# Patient Record
Sex: MALE | Race: WHITE | Employment: OTHER | URBAN - METROPOLITAN AREA
[De-identification: names, ages, dates, MRNs, and addresses within clinical notes are randomized per-mention and may not be internally consistent; named-entity substitution may affect disease eponyms.]

---

## 2019-01-01 ENCOUNTER — MEDICAL CORRESPONDENCE (OUTPATIENT)
Dept: HEALTH INFORMATION MANAGEMENT | Facility: CLINIC | Age: 74
End: 2019-01-01

## 2020-01-01 ENCOUNTER — ANESTHESIA EVENT (OUTPATIENT)
Dept: SURGERY | Facility: CLINIC | Age: 75
DRG: 003 | End: 2020-01-01
Payer: COMMERCIAL

## 2020-01-01 ENCOUNTER — APPOINTMENT (OUTPATIENT)
Dept: GENERAL RADIOLOGY | Facility: CLINIC | Age: 75
DRG: 003 | End: 2020-01-01
Attending: ANESTHESIOLOGY
Payer: COMMERCIAL

## 2020-01-01 ENCOUNTER — APPOINTMENT (OUTPATIENT)
Dept: GENERAL RADIOLOGY | Facility: CLINIC | Age: 75
DRG: 003 | End: 2020-01-01
Attending: NEUROLOGICAL SURGERY
Payer: COMMERCIAL

## 2020-01-01 ENCOUNTER — PREP FOR PROCEDURE (OUTPATIENT)
Dept: CARDIOLOGY | Facility: CLINIC | Age: 75
End: 2020-01-01

## 2020-01-01 ENCOUNTER — TRANSFERRED RECORDS (OUTPATIENT)
Dept: HEALTH INFORMATION MANAGEMENT | Facility: CLINIC | Age: 75
End: 2020-01-01

## 2020-01-01 ENCOUNTER — ANESTHESIA (OUTPATIENT)
Dept: SURGERY | Facility: CLINIC | Age: 75
DRG: 003 | End: 2020-01-01
Payer: COMMERCIAL

## 2020-01-01 ENCOUNTER — MEDICAL CORRESPONDENCE (OUTPATIENT)
Dept: SURGERY | Facility: CLINIC | Age: 75
End: 2020-01-01

## 2020-01-01 ENCOUNTER — APPOINTMENT (OUTPATIENT)
Dept: CARDIOLOGY | Facility: CLINIC | Age: 75
DRG: 003 | End: 2020-01-01
Attending: ANESTHESIOLOGY
Payer: COMMERCIAL

## 2020-01-01 ENCOUNTER — ANESTHESIA (OUTPATIENT)
Dept: INTENSIVE CARE | Facility: CLINIC | Age: 75
End: 2020-01-01

## 2020-01-01 ENCOUNTER — ANESTHESIA (OUTPATIENT)
Dept: INTENSIVE CARE | Facility: CLINIC | Age: 75
DRG: 003 | End: 2020-01-01
Payer: COMMERCIAL

## 2020-01-01 ENCOUNTER — ANESTHESIA EVENT (OUTPATIENT)
Dept: INTENSIVE CARE | Facility: CLINIC | Age: 75
End: 2020-01-01

## 2020-01-01 ENCOUNTER — APPOINTMENT (OUTPATIENT)
Dept: GENERAL RADIOLOGY | Facility: CLINIC | Age: 75
DRG: 003 | End: 2020-01-01
Payer: COMMERCIAL

## 2020-01-01 ENCOUNTER — APPOINTMENT (OUTPATIENT)
Dept: GENERAL RADIOLOGY | Facility: CLINIC | Age: 75
DRG: 003 | End: 2020-01-01
Attending: SURGERY
Payer: COMMERCIAL

## 2020-01-01 ENCOUNTER — APPOINTMENT (OUTPATIENT)
Dept: GENERAL RADIOLOGY | Facility: CLINIC | Age: 75
DRG: 003 | End: 2020-01-01
Attending: PHYSICIAN ASSISTANT
Payer: COMMERCIAL

## 2020-01-01 ENCOUNTER — HOSPITAL ENCOUNTER (INPATIENT)
Facility: CLINIC | Age: 75
LOS: 9 days | DRG: 003 | End: 2020-01-26
Attending: FAMILY MEDICINE | Admitting: ANESTHESIOLOGY
Payer: COMMERCIAL

## 2020-01-01 ENCOUNTER — APPOINTMENT (OUTPATIENT)
Dept: GENERAL RADIOLOGY | Facility: CLINIC | Age: 75
DRG: 003 | End: 2020-01-01
Attending: STUDENT IN AN ORGANIZED HEALTH CARE EDUCATION/TRAINING PROGRAM
Payer: COMMERCIAL

## 2020-01-01 ENCOUNTER — APPOINTMENT (OUTPATIENT)
Dept: GENERAL RADIOLOGY | Facility: CLINIC | Age: 75
DRG: 003 | End: 2020-01-01
Attending: INTERNAL MEDICINE
Payer: COMMERCIAL

## 2020-01-01 ENCOUNTER — APPOINTMENT (OUTPATIENT)
Dept: CARDIOLOGY | Facility: CLINIC | Age: 75
DRG: 003 | End: 2020-01-01
Attending: STUDENT IN AN ORGANIZED HEALTH CARE EDUCATION/TRAINING PROGRAM
Payer: COMMERCIAL

## 2020-01-01 ENCOUNTER — APPOINTMENT (OUTPATIENT)
Dept: CT IMAGING | Facility: CLINIC | Age: 75
DRG: 003 | End: 2020-01-01
Payer: COMMERCIAL

## 2020-01-01 ENCOUNTER — ANESTHESIA EVENT (OUTPATIENT)
Dept: INTENSIVE CARE | Facility: CLINIC | Age: 75
DRG: 003 | End: 2020-01-01
Payer: COMMERCIAL

## 2020-01-01 VITALS
DIASTOLIC BLOOD PRESSURE: 67 MMHG | WEIGHT: 223.33 LBS | SYSTOLIC BLOOD PRESSURE: 86 MMHG | OXYGEN SATURATION: 33 % | HEART RATE: 86 BPM | TEMPERATURE: 98.4 F | BODY MASS INDEX: 31.97 KG/M2 | HEIGHT: 70 IN

## 2020-01-01 DIAGNOSIS — I71.21 ASCENDING AORTIC ANEURYSM (H): ICD-10-CM

## 2020-01-01 DIAGNOSIS — I71.21 ASCENDING AORTIC ANEURYSM (H): Primary | ICD-10-CM

## 2020-01-01 DIAGNOSIS — I71.00 DISSECTION OF AORTA, UNSPECIFIED PORTION OF AORTA (H): ICD-10-CM

## 2020-01-01 DIAGNOSIS — I50.9 HEART FAILURE (H): Primary | ICD-10-CM

## 2020-01-01 DIAGNOSIS — I71.019 DISSECTION OF THORACIC AORTA (H): Primary | ICD-10-CM

## 2020-01-01 DIAGNOSIS — I50.9 HEART FAILURE (H): ICD-10-CM

## 2020-01-01 LAB
ABO + RH BLD: NORMAL
ACID FAST STN SPEC QL: NORMAL
ALBUMIN SERPL-MCNC: 1.6 G/DL (ref 3.4–5)
ALBUMIN SERPL-MCNC: 1.8 G/DL (ref 3.4–5)
ALBUMIN SERPL-MCNC: 2.2 G/DL (ref 3.4–5)
ALBUMIN SERPL-MCNC: 2.3 G/DL (ref 3.4–5)
ALBUMIN SERPL-MCNC: 2.5 G/DL (ref 3.4–5)
ALBUMIN SERPL-MCNC: 2.8 G/DL (ref 3.4–5)
ALBUMIN SERPL-MCNC: 2.8 G/DL (ref 3.4–5)
ALBUMIN SERPL-MCNC: 3.1 G/DL (ref 3.4–5)
ALBUMIN SERPL-MCNC: 3.2 G/DL (ref 3.4–5)
ALBUMIN SERPL-MCNC: 3.4 G/DL (ref 3.4–5)
ALBUMIN SERPL-MCNC: 3.7 G/DL (ref 3.4–5)
ALBUMIN UR-MCNC: 30 MG/DL
ALBUMIN UR-MCNC: NEGATIVE MG/DL
ALP SERPL-CCNC: 35 U/L (ref 40–150)
ALP SERPL-CCNC: 39 U/L (ref 40–150)
ALP SERPL-CCNC: 40 U/L (ref 40–150)
ALP SERPL-CCNC: 44 U/L (ref 40–150)
ALP SERPL-CCNC: 47 U/L (ref 40–150)
ALP SERPL-CCNC: 48 U/L (ref 40–150)
ALP SERPL-CCNC: 49 U/L (ref 40–150)
ALP SERPL-CCNC: 49 U/L (ref 40–150)
ALP SERPL-CCNC: 78 U/L (ref 40–150)
ALT SERPL W P-5'-P-CCNC: 138 U/L (ref 0–70)
ALT SERPL W P-5'-P-CCNC: 144 U/L (ref 0–70)
ALT SERPL W P-5'-P-CCNC: 158 U/L (ref 0–70)
ALT SERPL W P-5'-P-CCNC: 164 U/L (ref 0–70)
ALT SERPL W P-5'-P-CCNC: 169 U/L (ref 0–70)
ALT SERPL W P-5'-P-CCNC: 174 U/L (ref 0–70)
ALT SERPL W P-5'-P-CCNC: 177 U/L (ref 0–70)
ALT SERPL W P-5'-P-CCNC: 185 U/L (ref 0–70)
ALT SERPL W P-5'-P-CCNC: 193 U/L (ref 0–70)
ALT SERPL W P-5'-P-CCNC: 202 U/L (ref 0–70)
ALT SERPL W P-5'-P-CCNC: 211 U/L (ref 0–70)
ANGLE RATE OF CLOT GROWTH: 32.1 DEG (ref 59–74)
ANGLE RATE OF CLOT GROWTH: 50.1 DEG (ref 59–74)
ANGLE RATE OF CLOT GROWTH: 62.5 DEG (ref 59–74)
ANGLE RATE OF CLOT GROWTH: 74.2 DEG (ref 59–74)
ANGLE RATE OF CLOT GROWTH: ABNORMAL DEG (ref 59–74)
ANGLE RATE OF CLOT STRENGTH: 63.4 DEGREES (ref 53–72)
ANION GAP SERPL CALCULATED.3IONS-SCNC: 10 MMOL/L (ref 3–14)
ANION GAP SERPL CALCULATED.3IONS-SCNC: 10 MMOL/L (ref 3–14)
ANION GAP SERPL CALCULATED.3IONS-SCNC: 12 MMOL/L (ref 3–14)
ANION GAP SERPL CALCULATED.3IONS-SCNC: 15 MMOL/L (ref 3–14)
ANION GAP SERPL CALCULATED.3IONS-SCNC: 17 MMOL/L (ref 3–14)
ANION GAP SERPL CALCULATED.3IONS-SCNC: 2 MMOL/L (ref 3–14)
ANION GAP SERPL CALCULATED.3IONS-SCNC: 21 MMOL/L (ref 3–14)
ANION GAP SERPL CALCULATED.3IONS-SCNC: 3 MMOL/L (ref 3–14)
ANION GAP SERPL CALCULATED.3IONS-SCNC: 3 MMOL/L (ref 3–14)
ANION GAP SERPL CALCULATED.3IONS-SCNC: 4 MMOL/L (ref 3–14)
ANION GAP SERPL CALCULATED.3IONS-SCNC: 5 MMOL/L (ref 3–14)
ANION GAP SERPL CALCULATED.3IONS-SCNC: 6 MMOL/L (ref 3–14)
ANION GAP SERPL CALCULATED.3IONS-SCNC: 6 MMOL/L (ref 3–14)
ANION GAP SERPL CALCULATED.3IONS-SCNC: 7 MMOL/L (ref 3–14)
ANION GAP SERPL CALCULATED.3IONS-SCNC: 7 MMOL/L (ref 3–14)
ANION GAP SERPL CALCULATED.3IONS-SCNC: 9 MMOL/L (ref 3–14)
ANION GAP SERPL CALCULATED.3IONS-SCNC: <1 MMOL/L (ref 3–14)
APPEARANCE UR: ABNORMAL
APPEARANCE UR: CLEAR
APTT PPP: 101 SEC (ref 22–37)
APTT PPP: 117 SEC (ref 22–37)
APTT PPP: 33 SEC (ref 22–37)
APTT PPP: 34 SEC (ref 22–37)
APTT PPP: 35 SEC (ref 22–37)
APTT PPP: 35 SEC (ref 22–37)
APTT PPP: 36 SEC (ref 22–37)
APTT PPP: 37 SEC (ref 22–37)
APTT PPP: 39 SEC (ref 22–37)
APTT PPP: 41 SEC (ref 22–37)
APTT PPP: 45 SEC (ref 22–37)
APTT PPP: 46 SEC (ref 22–37)
APTT PPP: 48 SEC (ref 22–37)
APTT PPP: 49 SEC (ref 22–37)
APTT PPP: 52 SEC (ref 22–37)
APTT PPP: 53 SEC (ref 22–37)
APTT PPP: 54 SEC (ref 22–37)
APTT PPP: 56 SEC (ref 22–37)
APTT PPP: 64 SEC (ref 22–37)
APTT PPP: 66 SEC (ref 22–37)
APTT PPP: 66 SEC (ref 22–37)
APTT PPP: 75 SEC (ref 22–37)
APTT PPP: 77 SEC (ref 22–37)
APTT PPP: 85 SEC (ref 22–37)
APTT PPP: >240 SEC (ref 22–37)
AST SERPL W P-5'-P-CCNC: 115 U/L (ref 0–45)
AST SERPL W P-5'-P-CCNC: 154 U/L (ref 0–45)
AST SERPL W P-5'-P-CCNC: 191 U/L (ref 0–45)
AST SERPL W P-5'-P-CCNC: 209 U/L (ref 0–45)
AST SERPL W P-5'-P-CCNC: 210 U/L (ref 0–45)
AST SERPL W P-5'-P-CCNC: 325 U/L (ref 0–45)
AST SERPL W P-5'-P-CCNC: 364 U/L (ref 0–45)
AST SERPL W P-5'-P-CCNC: 395 U/L (ref 0–45)
AST SERPL W P-5'-P-CCNC: 87 U/L (ref 0–45)
AT III ACT/NOR PPP CHRO: 49 % (ref 85–135)
AT III ACT/NOR PPP CHRO: 53 % (ref 85–135)
AT III ACT/NOR PPP CHRO: 55 % (ref 85–135)
AT III ACT/NOR PPP CHRO: 64 % (ref 85–135)
BACTERIA SPEC CULT: ABNORMAL
BACTERIA SPEC CULT: NO GROWTH
BACTERIA SPEC CULT: NORMAL
BASE DEFICIT BLDA-SCNC: 0.2 MMOL/L
BASE DEFICIT BLDA-SCNC: 0.3 MMOL/L
BASE DEFICIT BLDA-SCNC: 0.3 MMOL/L
BASE DEFICIT BLDA-SCNC: 0.4 MMOL/L
BASE DEFICIT BLDA-SCNC: 0.5 MMOL/L
BASE DEFICIT BLDA-SCNC: 0.7 MMOL/L
BASE DEFICIT BLDA-SCNC: 1.2 MMOL/L
BASE DEFICIT BLDA-SCNC: 1.5 MMOL/L
BASE DEFICIT BLDA-SCNC: 10 MMOL/L
BASE DEFICIT BLDA-SCNC: 14.2 MMOL/L
BASE DEFICIT BLDA-SCNC: 16.1 MMOL/L
BASE DEFICIT BLDA-SCNC: 2.5 MMOL/L
BASE DEFICIT BLDA-SCNC: 3 MMOL/L
BASE DEFICIT BLDA-SCNC: 3.7 MMOL/L
BASE DEFICIT BLDA-SCNC: 3.9 MMOL/L
BASE DEFICIT BLDA-SCNC: 4 MMOL/L
BASE DEFICIT BLDA-SCNC: 4.2 MMOL/L
BASE DEFICIT BLDA-SCNC: 5 MMOL/L
BASE DEFICIT BLDA-SCNC: 5.1 MMOL/L
BASE DEFICIT BLDA-SCNC: 5.3 MMOL/L
BASE DEFICIT BLDA-SCNC: 5.7 MMOL/L
BASE DEFICIT BLDA-SCNC: 5.8 MMOL/L
BASE DEFICIT BLDA-SCNC: 6.1 MMOL/L
BASE DEFICIT BLDA-SCNC: 6.2 MMOL/L
BASE DEFICIT BLDA-SCNC: 6.3 MMOL/L
BASE DEFICIT BLDA-SCNC: 6.8 MMOL/L
BASE DEFICIT BLDA-SCNC: 6.8 MMOL/L
BASE DEFICIT BLDA-SCNC: 7.3 MMOL/L
BASE DEFICIT BLDA-SCNC: 7.3 MMOL/L
BASE DEFICIT BLDV-SCNC: 2.9 MMOL/L
BASE DEFICIT BLDV-SCNC: 3.8 MMOL/L
BASE DEFICIT BLDV-SCNC: 4.8 MMOL/L
BASE DEFICIT BLDV-SCNC: 5.1 MMOL/L
BASE EXCESS BLDA CALC-SCNC: 0 MMOL/L
BASE EXCESS BLDA CALC-SCNC: 0.2 MMOL/L
BASE EXCESS BLDA CALC-SCNC: 0.4 MMOL/L
BASE EXCESS BLDA CALC-SCNC: 0.4 MMOL/L
BASE EXCESS BLDA CALC-SCNC: 1.6 MMOL/L
BASE EXCESS BLDA CALC-SCNC: 1.7 MMOL/L
BASE EXCESS BLDA CALC-SCNC: 10 MMOL/L
BASE EXCESS BLDA CALC-SCNC: 10.2 MMOL/L
BASE EXCESS BLDA CALC-SCNC: 10.4 MMOL/L
BASE EXCESS BLDA CALC-SCNC: 10.9 MMOL/L
BASE EXCESS BLDA CALC-SCNC: 11 MMOL/L
BASE EXCESS BLDA CALC-SCNC: 11.1 MMOL/L
BASE EXCESS BLDA CALC-SCNC: 11.3 MMOL/L
BASE EXCESS BLDA CALC-SCNC: 11.9 MMOL/L
BASE EXCESS BLDA CALC-SCNC: 12 MMOL/L
BASE EXCESS BLDA CALC-SCNC: 12.4 MMOL/L
BASE EXCESS BLDA CALC-SCNC: 12.6 MMOL/L
BASE EXCESS BLDA CALC-SCNC: 12.9 MMOL/L
BASE EXCESS BLDA CALC-SCNC: 13 MMOL/L
BASE EXCESS BLDA CALC-SCNC: 13.2 MMOL/L
BASE EXCESS BLDA CALC-SCNC: 13.3 MMOL/L
BASE EXCESS BLDA CALC-SCNC: 13.4 MMOL/L
BASE EXCESS BLDA CALC-SCNC: 13.4 MMOL/L
BASE EXCESS BLDA CALC-SCNC: 14 MMOL/L
BASE EXCESS BLDA CALC-SCNC: 14.6 MMOL/L
BASE EXCESS BLDA CALC-SCNC: 14.6 MMOL/L
BASE EXCESS BLDA CALC-SCNC: 14.8 MMOL/L
BASE EXCESS BLDA CALC-SCNC: 2.2 MMOL/L
BASE EXCESS BLDA CALC-SCNC: 3.4 MMOL/L
BASE EXCESS BLDA CALC-SCNC: 4.7 MMOL/L
BASE EXCESS BLDA CALC-SCNC: 5.3 MMOL/L
BASE EXCESS BLDA CALC-SCNC: 5.4 MMOL/L
BASE EXCESS BLDA CALC-SCNC: 5.9 MMOL/L
BASE EXCESS BLDA CALC-SCNC: 6.1 MMOL/L
BASE EXCESS BLDA CALC-SCNC: 6.2 MMOL/L
BASE EXCESS BLDA CALC-SCNC: 6.3 MMOL/L
BASE EXCESS BLDA CALC-SCNC: 6.3 MMOL/L
BASE EXCESS BLDA CALC-SCNC: 6.7 MMOL/L
BASE EXCESS BLDA CALC-SCNC: 6.8 MMOL/L
BASE EXCESS BLDA CALC-SCNC: 6.9 MMOL/L
BASE EXCESS BLDA CALC-SCNC: 7.2 MMOL/L
BASE EXCESS BLDA CALC-SCNC: 7.2 MMOL/L
BASE EXCESS BLDA CALC-SCNC: 7.7 MMOL/L
BASE EXCESS BLDA CALC-SCNC: 7.9 MMOL/L
BASE EXCESS BLDA CALC-SCNC: 8 MMOL/L
BASE EXCESS BLDA CALC-SCNC: 8.1 MMOL/L
BASE EXCESS BLDA CALC-SCNC: 8.4 MMOL/L
BASE EXCESS BLDA CALC-SCNC: 8.5 MMOL/L
BASE EXCESS BLDA CALC-SCNC: 9 MMOL/L
BASE EXCESS BLDA CALC-SCNC: 9.1 MMOL/L
BASE EXCESS BLDA CALC-SCNC: 9.2 MMOL/L
BASE EXCESS BLDA CALC-SCNC: 9.6 MMOL/L
BASE EXCESS BLDA CALC-SCNC: 9.7 MMOL/L
BASE EXCESS BLDA CALC-SCNC: 9.8 MMOL/L
BASE EXCESS BLDA CALC-SCNC: 9.9 MMOL/L
BASE EXCESS BLDV CALC-SCNC: 0.4 MMOL/L
BASE EXCESS BLDV CALC-SCNC: 0.6 MMOL/L
BASE EXCESS BLDV CALC-SCNC: 10.3 MMOL/L
BASE EXCESS BLDV CALC-SCNC: 10.5 MMOL/L
BASE EXCESS BLDV CALC-SCNC: 13.3 MMOL/L
BASE EXCESS BLDV CALC-SCNC: 16.2 MMOL/L
BASE EXCESS BLDV CALC-SCNC: 2.2 MMOL/L
BASE EXCESS BLDV CALC-SCNC: 3.3 MMOL/L
BASE EXCESS BLDV CALC-SCNC: 3.6 MMOL/L
BASE EXCESS BLDV CALC-SCNC: 6.5 MMOL/L
BASE EXCESS BLDV CALC-SCNC: 9.3 MMOL/L
BASOPHILS # BLD AUTO: 0 10E9/L (ref 0–0.2)
BASOPHILS # BLD AUTO: 0.1 10E9/L (ref 0–0.2)
BASOPHILS NFR BLD AUTO: 0.4 %
BASOPHILS NFR BLD AUTO: 0.6 %
BILIRUB DIRECT SERPL-MCNC: 3.9 MG/DL (ref 0–0.2)
BILIRUB SERPL-MCNC: 1.6 MG/DL (ref 0.2–1.3)
BILIRUB SERPL-MCNC: 1.8 MG/DL (ref 0.2–1.3)
BILIRUB SERPL-MCNC: 1.8 MG/DL (ref 0.2–1.3)
BILIRUB SERPL-MCNC: 1.9 MG/DL (ref 0.2–1.3)
BILIRUB SERPL-MCNC: 2.2 MG/DL (ref 0.2–1.3)
BILIRUB SERPL-MCNC: 2.3 MG/DL (ref 0.2–1.3)
BILIRUB SERPL-MCNC: 2.7 MG/DL (ref 0.2–1.3)
BILIRUB SERPL-MCNC: 2.8 MG/DL (ref 0.2–1.3)
BILIRUB SERPL-MCNC: 5.3 MG/DL (ref 0.2–1.3)
BILIRUB UR QL STRIP: NEGATIVE
BILIRUB UR QL STRIP: NEGATIVE
BLD GP AB SCN SERPL QL: NORMAL
BLD PROD TYP BPU: NORMAL
BLD UNIT ID BPU: 0
BLOOD BANK CMNT PATIENT-IMP: NORMAL
BLOOD PRODUCT CODE: NORMAL
BPU ID: NORMAL
BUN SERPL-MCNC: 21 MG/DL (ref 7–30)
BUN SERPL-MCNC: 22 MG/DL (ref 7–30)
BUN SERPL-MCNC: 23 MG/DL (ref 7–30)
BUN SERPL-MCNC: 23 MG/DL (ref 7–30)
BUN SERPL-MCNC: 24 MG/DL (ref 7–30)
BUN SERPL-MCNC: 25 MG/DL (ref 7–30)
BUN SERPL-MCNC: 26 MG/DL (ref 7–30)
BUN SERPL-MCNC: 26 MG/DL (ref 7–30)
BUN SERPL-MCNC: 27 MG/DL (ref 7–30)
BUN SERPL-MCNC: 27 MG/DL (ref 7–30)
BUN SERPL-MCNC: 28 MG/DL (ref 7–30)
BUN SERPL-MCNC: 29 MG/DL (ref 7–30)
BUN SERPL-MCNC: 30 MG/DL (ref 7–30)
BUN SERPL-MCNC: 33 MG/DL (ref 7–30)
BUN SERPL-MCNC: 43 MG/DL (ref 7–30)
BUN SERPL-MCNC: 47 MG/DL (ref 7–30)
BUN SERPL-MCNC: 58 MG/DL (ref 7–30)
BUN SERPL-MCNC: 62 MG/DL (ref 7–30)
BUN SERPL-MCNC: 68 MG/DL (ref 7–30)
BUN SERPL-MCNC: 69 MG/DL (ref 7–30)
BUN SERPL-MCNC: 70 MG/DL (ref 7–30)
BUN SERPL-MCNC: 73 MG/DL (ref 7–30)
BUN SERPL-MCNC: 73 MG/DL (ref 7–30)
BUN SERPL-MCNC: 75 MG/DL (ref 7–30)
BUN SERPL-MCNC: 76 MG/DL (ref 7–30)
CA-I BLD-MCNC: 2.4 MG/DL (ref 4.4–5.2)
CA-I BLD-MCNC: 2.6 MG/DL (ref 4.4–5.2)
CA-I BLD-MCNC: 2.7 MG/DL (ref 4.4–5.2)
CA-I BLD-MCNC: 3.3 MG/DL (ref 4.4–5.2)
CA-I BLD-MCNC: 3.3 MG/DL (ref 4.4–5.2)
CA-I BLD-MCNC: 3.4 MG/DL (ref 4.4–5.2)
CA-I BLD-MCNC: 3.5 MG/DL (ref 4.4–5.2)
CA-I BLD-MCNC: 3.8 MG/DL (ref 4.4–5.2)
CA-I BLD-MCNC: 3.8 MG/DL (ref 4.4–5.2)
CA-I BLD-MCNC: 4.1 MG/DL (ref 4.4–5.2)
CA-I BLD-MCNC: 4.1 MG/DL (ref 4.4–5.2)
CA-I BLD-MCNC: 4.2 MG/DL (ref 4.4–5.2)
CA-I BLD-MCNC: 4.2 MG/DL (ref 4.4–5.2)
CA-I BLD-MCNC: 4.3 MG/DL (ref 4.4–5.2)
CA-I BLD-MCNC: 4.4 MG/DL (ref 4.4–5.2)
CA-I BLD-MCNC: 4.5 MG/DL (ref 4.4–5.2)
CA-I BLD-MCNC: 4.5 MG/DL (ref 4.4–5.2)
CA-I BLD-MCNC: 4.6 MG/DL (ref 4.4–5.2)
CA-I BLD-MCNC: 4.6 MG/DL (ref 4.4–5.2)
CA-I BLD-MCNC: 4.7 MG/DL (ref 4.4–5.2)
CA-I BLD-MCNC: 4.8 MG/DL (ref 4.4–5.2)
CA-I BLD-MCNC: 4.8 MG/DL (ref 4.4–5.2)
CA-I BLD-MCNC: 4.9 MG/DL (ref 4.4–5.2)
CA-I BLD-MCNC: 5.3 MG/DL (ref 4.4–5.2)
CA-I BLD-MCNC: 6.8 MG/DL (ref 4.4–5.2)
CALCIUM SERPL-MCNC: 10.6 MG/DL (ref 8.5–10.1)
CALCIUM SERPL-MCNC: 14.7 MG/DL (ref 8.5–10.1)
CALCIUM SERPL-MCNC: 7.1 MG/DL (ref 8.5–10.1)
CALCIUM SERPL-MCNC: 7.6 MG/DL (ref 8.5–10.1)
CALCIUM SERPL-MCNC: 7.7 MG/DL (ref 8.5–10.1)
CALCIUM SERPL-MCNC: 7.8 MG/DL (ref 8.5–10.1)
CALCIUM SERPL-MCNC: 7.8 MG/DL (ref 8.5–10.1)
CALCIUM SERPL-MCNC: 7.9 MG/DL (ref 8.5–10.1)
CALCIUM SERPL-MCNC: 7.9 MG/DL (ref 8.5–10.1)
CALCIUM SERPL-MCNC: 8 MG/DL (ref 8.5–10.1)
CALCIUM SERPL-MCNC: 8.1 MG/DL (ref 8.5–10.1)
CALCIUM SERPL-MCNC: 8.2 MG/DL (ref 8.5–10.1)
CALCIUM SERPL-MCNC: 8.6 MG/DL (ref 8.5–10.1)
CALCIUM SERPL-MCNC: 9 MG/DL (ref 8.5–10.1)
CALCIUM SERPL-MCNC: 9.1 MG/DL (ref 8.5–10.1)
CALCIUM SERPL-MCNC: 9.7 MG/DL (ref 8.5–10.1)
CHLORIDE SERPL-SCNC: 109 MMOL/L (ref 94–109)
CHLORIDE SERPL-SCNC: 110 MMOL/L (ref 94–109)
CHLORIDE SERPL-SCNC: 112 MMOL/L (ref 94–109)
CHLORIDE SERPL-SCNC: 115 MMOL/L (ref 94–109)
CHLORIDE SERPL-SCNC: 116 MMOL/L (ref 94–109)
CHLORIDE SERPL-SCNC: 116 MMOL/L (ref 94–109)
CHLORIDE SERPL-SCNC: 117 MMOL/L (ref 94–109)
CHLORIDE SERPL-SCNC: 118 MMOL/L (ref 94–109)
CHLORIDE SERPL-SCNC: 119 MMOL/L (ref 94–109)
CHLORIDE SERPL-SCNC: 120 MMOL/L (ref 94–109)
CI HYPERCOAGULATION INDEX: 1.4 RATIO (ref 0–3)
CI HYPERCOAGULATION INDEX: ABNORMAL RATIO (ref 0–3)
CI HYPOCOAGULATION INDEX: 0.5 RATIO (ref 0–3)
CI HYPOCOAGULATION INDEX: 1.2 RATIO (ref 0–3)
CI HYPOCOAGULATION INDEX: 3.3 RATIO (ref 0–3)
CI HYPOCOAGULATION INDEX: 7.6 RATIO (ref 0–3)
CI HYPOCOAGULATION INDEX: ABNORMAL RATIO (ref 0–3)
CLOT LYSIS 30M P MA LENFR BLD TEG: 0 % (ref 0–8)
CLOT LYSIS 30M P MA LENFR BLD TEG: 1 % (ref 0–8)
CLOT LYSIS 30M P MA LENFR BLD TEG: ABNORMAL % (ref 0–8)
CLOT STRENGTH BLD TEG: 3 KD/SC (ref 5.3–13.2)
CLOT STRENGTH BLD TEG: 4.6 KD/SC (ref 5.3–13.2)
CLOT STRENGTH BLD TEG: 6.8 KD/SC (ref 5.3–13.2)
CLOT STRENGTH BLD TEG: 7.3 KD/SC (ref 5.3–13.2)
CLOT STRENGTH BLD TEG: ABNORMAL KD/SC (ref 5.3–13.2)
CO2 SERPL-SCNC: 15 MMOL/L (ref 20–32)
CO2 SERPL-SCNC: 18 MMOL/L (ref 20–32)
CO2 SERPL-SCNC: 18 MMOL/L (ref 20–32)
CO2 SERPL-SCNC: 20 MMOL/L (ref 20–32)
CO2 SERPL-SCNC: 21 MMOL/L (ref 20–32)
CO2 SERPL-SCNC: 22 MMOL/L (ref 20–32)
CO2 SERPL-SCNC: 22 MMOL/L (ref 20–32)
CO2 SERPL-SCNC: 23 MMOL/L (ref 20–32)
CO2 SERPL-SCNC: 25 MMOL/L (ref 20–32)
CO2 SERPL-SCNC: 26 MMOL/L (ref 20–32)
CO2 SERPL-SCNC: 27 MMOL/L (ref 20–32)
CO2 SERPL-SCNC: 27 MMOL/L (ref 20–32)
CO2 SERPL-SCNC: 28 MMOL/L (ref 20–32)
CO2 SERPL-SCNC: 30 MMOL/L (ref 20–32)
CO2 SERPL-SCNC: 33 MMOL/L (ref 20–32)
CO2 SERPL-SCNC: 34 MMOL/L (ref 20–32)
CO2 SERPL-SCNC: 35 MMOL/L (ref 20–32)
CO2 SERPL-SCNC: 35 MMOL/L (ref 20–32)
CO2 SERPL-SCNC: 36 MMOL/L (ref 20–32)
CO2 SERPL-SCNC: 37 MMOL/L (ref 20–32)
CO2 SERPL-SCNC: 38 MMOL/L (ref 20–32)
CO2 SERPL-SCNC: 40 MMOL/L (ref 20–32)
COLOR UR AUTO: YELLOW
COLOR UR AUTO: YELLOW
CREAT SERPL-MCNC: 0.8 MG/DL (ref 0.66–1.25)
CREAT SERPL-MCNC: 0.81 MG/DL (ref 0.66–1.25)
CREAT SERPL-MCNC: 0.84 MG/DL (ref 0.66–1.25)
CREAT SERPL-MCNC: 0.84 MG/DL (ref 0.66–1.25)
CREAT SERPL-MCNC: 0.85 MG/DL (ref 0.66–1.25)
CREAT SERPL-MCNC: 0.86 MG/DL (ref 0.66–1.25)
CREAT SERPL-MCNC: 0.88 MG/DL (ref 0.66–1.25)
CREAT SERPL-MCNC: 0.88 MG/DL (ref 0.66–1.25)
CREAT SERPL-MCNC: 0.9 MG/DL (ref 0.66–1.25)
CREAT SERPL-MCNC: 0.92 MG/DL (ref 0.66–1.25)
CREAT SERPL-MCNC: 1.07 MG/DL (ref 0.66–1.25)
CREAT SERPL-MCNC: 1.08 MG/DL (ref 0.66–1.25)
CREAT SERPL-MCNC: 1.15 MG/DL (ref 0.66–1.25)
CREAT SERPL-MCNC: 1.25 MG/DL (ref 0.66–1.25)
CREAT SERPL-MCNC: 1.31 MG/DL (ref 0.66–1.25)
CREAT SERPL-MCNC: 1.34 MG/DL (ref 0.66–1.25)
CREAT SERPL-MCNC: 1.35 MG/DL (ref 0.66–1.25)
CREAT SERPL-MCNC: 1.61 MG/DL (ref 0.66–1.25)
CREAT SERPL-MCNC: 1.82 MG/DL (ref 0.66–1.25)
CREAT SERPL-MCNC: 1.86 MG/DL (ref 0.66–1.25)
CREAT SERPL-MCNC: 1.94 MG/DL (ref 0.66–1.25)
CREAT SERPL-MCNC: 2.05 MG/DL (ref 0.66–1.25)
CREAT SERPL-MCNC: 2.12 MG/DL (ref 0.66–1.25)
CREAT SERPL-MCNC: 2.16 MG/DL (ref 0.66–1.25)
CREAT SERPL-MCNC: 2.21 MG/DL (ref 0.66–1.25)
CREAT SERPL-MCNC: 2.21 MG/DL (ref 0.66–1.25)
CREAT SERPL-MCNC: 2.25 MG/DL (ref 0.66–1.25)
CREAT SERPL-MCNC: 2.28 MG/DL (ref 0.66–1.25)
CRP SERPL-MCNC: 130 MG/L (ref 0–8)
CRP SERPL-MCNC: 150 MG/L (ref 0–8)
D DIMER PPP FEU-MCNC: 11.9 UG/ML FEU (ref 0–0.5)
D DIMER PPP FEU-MCNC: 12.6 UG/ML FEU (ref 0–0.5)
D DIMER PPP FEU-MCNC: 16.8 UG/ML FEU (ref 0–0.5)
D DIMER PPP FEU-MCNC: 5.1 UG/ML FEU (ref 0–0.5)
D DIMER PPP FEU-MCNC: 5.7 UG/ML FEU (ref 0–0.5)
D DIMER PPP FEU-MCNC: 5.9 UG/ML FEU (ref 0–0.5)
D DIMER PPP FEU-MCNC: 9.1 UG/ML FEU (ref 0–0.5)
DIFFERENTIAL METHOD BLD: ABNORMAL
DIFFERENTIAL METHOD BLD: ABNORMAL
EOSINOPHIL # BLD AUTO: 0 10E9/L (ref 0–0.7)
EOSINOPHIL # BLD AUTO: 0 10E9/L (ref 0–0.7)
EOSINOPHIL NFR BLD AUTO: 0 %
EOSINOPHIL NFR BLD AUTO: 0 %
ERYTHROCYTE [DISTWIDTH] IN BLOOD BY AUTOMATED COUNT: 13.9 % (ref 10–15)
ERYTHROCYTE [DISTWIDTH] IN BLOOD BY AUTOMATED COUNT: 13.9 % (ref 10–15)
ERYTHROCYTE [DISTWIDTH] IN BLOOD BY AUTOMATED COUNT: 14 % (ref 10–15)
ERYTHROCYTE [DISTWIDTH] IN BLOOD BY AUTOMATED COUNT: 14.1 % (ref 10–15)
ERYTHROCYTE [DISTWIDTH] IN BLOOD BY AUTOMATED COUNT: 14.4 % (ref 10–15)
ERYTHROCYTE [DISTWIDTH] IN BLOOD BY AUTOMATED COUNT: 14.5 % (ref 10–15)
ERYTHROCYTE [DISTWIDTH] IN BLOOD BY AUTOMATED COUNT: 14.6 % (ref 10–15)
ERYTHROCYTE [DISTWIDTH] IN BLOOD BY AUTOMATED COUNT: 14.9 % (ref 10–15)
ERYTHROCYTE [DISTWIDTH] IN BLOOD BY AUTOMATED COUNT: 15 % (ref 10–15)
ERYTHROCYTE [DISTWIDTH] IN BLOOD BY AUTOMATED COUNT: 15.5 % (ref 10–15)
ERYTHROCYTE [DISTWIDTH] IN BLOOD BY AUTOMATED COUNT: 15.8 % (ref 10–15)
ERYTHROCYTE [DISTWIDTH] IN BLOOD BY AUTOMATED COUNT: 15.9 % (ref 10–15)
ERYTHROCYTE [DISTWIDTH] IN BLOOD BY AUTOMATED COUNT: 15.9 % (ref 10–15)
ERYTHROCYTE [DISTWIDTH] IN BLOOD BY AUTOMATED COUNT: 16 % (ref 10–15)
ERYTHROCYTE [DISTWIDTH] IN BLOOD BY AUTOMATED COUNT: 16.1 % (ref 10–15)
ERYTHROCYTE [DISTWIDTH] IN BLOOD BY AUTOMATED COUNT: 16.2 % (ref 10–15)
ERYTHROCYTE [DISTWIDTH] IN BLOOD BY AUTOMATED COUNT: 16.3 % (ref 10–15)
ERYTHROCYTE [DISTWIDTH] IN BLOOD BY AUTOMATED COUNT: 16.3 % (ref 10–15)
ERYTHROCYTE [DISTWIDTH] IN BLOOD BY AUTOMATED COUNT: 16.4 % (ref 10–15)
ERYTHROCYTE [DISTWIDTH] IN BLOOD BY AUTOMATED COUNT: 16.4 % (ref 10–15)
ERYTHROCYTE [DISTWIDTH] IN BLOOD BY AUTOMATED COUNT: 16.5 % (ref 10–15)
ERYTHROCYTE [DISTWIDTH] IN BLOOD BY AUTOMATED COUNT: 17.2 % (ref 10–15)
ERYTHROCYTE [DISTWIDTH] IN BLOOD BY AUTOMATED COUNT: 17.4 % (ref 10–15)
ERYTHROCYTE [DISTWIDTH] IN BLOOD BY AUTOMATED COUNT: 17.5 % (ref 10–15)
ERYTHROCYTE [DISTWIDTH] IN BLOOD BY AUTOMATED COUNT: 17.6 % (ref 10–15)
ERYTHROCYTE [DISTWIDTH] IN BLOOD BY AUTOMATED COUNT: 17.6 % (ref 10–15)
FIBRINOGEN PPP-MCNC: 138 MG/DL (ref 200–420)
FIBRINOGEN PPP-MCNC: 190 MG/DL (ref 200–420)
FIBRINOGEN PPP-MCNC: 201 MG/DL (ref 200–420)
FIBRINOGEN PPP-MCNC: 202 MG/DL (ref 200–420)
FIBRINOGEN PPP-MCNC: 203 MG/DL (ref 200–420)
FIBRINOGEN PPP-MCNC: 223 MG/DL (ref 200–420)
FIBRINOGEN PPP-MCNC: 224 MG/DL (ref 200–420)
FIBRINOGEN PPP-MCNC: 227 MG/DL (ref 200–420)
FIBRINOGEN PPP-MCNC: 228 MG/DL (ref 200–420)
FIBRINOGEN PPP-MCNC: 230 MG/DL (ref 200–420)
FIBRINOGEN PPP-MCNC: 230 MG/DL (ref 200–420)
FIBRINOGEN PPP-MCNC: 251 MG/DL (ref 200–420)
FIBRINOGEN PPP-MCNC: 274 MG/DL (ref 200–420)
FIBRINOGEN PPP-MCNC: 276 MG/DL (ref 200–420)
FIBRINOGEN PPP-MCNC: 284 MG/DL (ref 200–420)
FIBRINOGEN PPP-MCNC: 293 MG/DL (ref 200–420)
FIBRINOGEN PPP-MCNC: 302 MG/DL (ref 200–420)
FIBRINOGEN PPP-MCNC: 312 MG/DL (ref 200–420)
FIBRINOGEN PPP-MCNC: 326 MG/DL (ref 200–420)
FIBRINOGEN PPP-MCNC: 61 MG/DL (ref 200–420)
G ACTUAL CLOT STRENGTH: 6 KD/SC (ref 4.5–11)
GFR SERPL CREATININE-BSD FRML MDRD: 27 ML/MIN/{1.73_M2}
GFR SERPL CREATININE-BSD FRML MDRD: 27 ML/MIN/{1.73_M2}
GFR SERPL CREATININE-BSD FRML MDRD: 28 ML/MIN/{1.73_M2}
GFR SERPL CREATININE-BSD FRML MDRD: 28 ML/MIN/{1.73_M2}
GFR SERPL CREATININE-BSD FRML MDRD: 29 ML/MIN/{1.73_M2}
GFR SERPL CREATININE-BSD FRML MDRD: 29 ML/MIN/{1.73_M2}
GFR SERPL CREATININE-BSD FRML MDRD: 30 ML/MIN/{1.73_M2}
GFR SERPL CREATININE-BSD FRML MDRD: 32 ML/MIN/{1.73_M2}
GFR SERPL CREATININE-BSD FRML MDRD: 34 ML/MIN/{1.73_M2}
GFR SERPL CREATININE-BSD FRML MDRD: 35 ML/MIN/{1.73_M2}
GFR SERPL CREATININE-BSD FRML MDRD: 41 ML/MIN/{1.73_M2}
GFR SERPL CREATININE-BSD FRML MDRD: 50 ML/MIN/{1.73_M2}
GFR SERPL CREATININE-BSD FRML MDRD: 51 ML/MIN/{1.73_M2}
GFR SERPL CREATININE-BSD FRML MDRD: 52 ML/MIN/{1.73_M2}
GFR SERPL CREATININE-BSD FRML MDRD: 55 ML/MIN/{1.73_M2}
GFR SERPL CREATININE-BSD FRML MDRD: 61 ML/MIN/{1.73_M2}
GFR SERPL CREATININE-BSD FRML MDRD: 66 ML/MIN/{1.73_M2}
GFR SERPL CREATININE-BSD FRML MDRD: 67 ML/MIN/{1.73_M2}
GFR SERPL CREATININE-BSD FRML MDRD: 80 ML/MIN/{1.73_M2}
GFR SERPL CREATININE-BSD FRML MDRD: 82 ML/MIN/{1.73_M2}
GFR SERPL CREATININE-BSD FRML MDRD: 83 ML/MIN/{1.73_M2}
GFR SERPL CREATININE-BSD FRML MDRD: 83 ML/MIN/{1.73_M2}
GFR SERPL CREATININE-BSD FRML MDRD: 84 ML/MIN/{1.73_M2}
GFR SERPL CREATININE-BSD FRML MDRD: 86 ML/MIN/{1.73_M2}
GFR SERPL CREATININE-BSD FRML MDRD: 86 ML/MIN/{1.73_M2}
GLUCOSE BLD-MCNC: 114 MG/DL (ref 70–99)
GLUCOSE BLD-MCNC: 115 MG/DL (ref 70–99)
GLUCOSE BLD-MCNC: 124 MG/DL (ref 70–99)
GLUCOSE BLD-MCNC: 127 MG/DL (ref 70–99)
GLUCOSE BLD-MCNC: 134 MG/DL (ref 70–99)
GLUCOSE BLD-MCNC: 144 MG/DL (ref 70–99)
GLUCOSE BLD-MCNC: 146 MG/DL (ref 70–99)
GLUCOSE BLD-MCNC: 146 MG/DL (ref 70–99)
GLUCOSE BLD-MCNC: 153 MG/DL (ref 70–99)
GLUCOSE BLD-MCNC: 158 MG/DL (ref 70–99)
GLUCOSE BLD-MCNC: 163 MG/DL (ref 70–99)
GLUCOSE BLD-MCNC: 164 MG/DL (ref 70–99)
GLUCOSE BLD-MCNC: 175 MG/DL (ref 70–99)
GLUCOSE BLD-MCNC: 193 MG/DL (ref 70–99)
GLUCOSE BLD-MCNC: 218 MG/DL (ref 70–99)
GLUCOSE BLDC GLUCOMTR-MCNC: 101 MG/DL (ref 70–99)
GLUCOSE BLDC GLUCOMTR-MCNC: 102 MG/DL (ref 70–99)
GLUCOSE BLDC GLUCOMTR-MCNC: 104 MG/DL (ref 70–99)
GLUCOSE BLDC GLUCOMTR-MCNC: 106 MG/DL (ref 70–99)
GLUCOSE BLDC GLUCOMTR-MCNC: 109 MG/DL (ref 70–99)
GLUCOSE BLDC GLUCOMTR-MCNC: 109 MG/DL (ref 70–99)
GLUCOSE BLDC GLUCOMTR-MCNC: 111 MG/DL (ref 70–99)
GLUCOSE BLDC GLUCOMTR-MCNC: 113 MG/DL (ref 70–99)
GLUCOSE BLDC GLUCOMTR-MCNC: 114 MG/DL (ref 70–99)
GLUCOSE BLDC GLUCOMTR-MCNC: 115 MG/DL (ref 70–99)
GLUCOSE BLDC GLUCOMTR-MCNC: 120 MG/DL (ref 70–99)
GLUCOSE BLDC GLUCOMTR-MCNC: 122 MG/DL (ref 70–99)
GLUCOSE BLDC GLUCOMTR-MCNC: 123 MG/DL (ref 70–99)
GLUCOSE BLDC GLUCOMTR-MCNC: 124 MG/DL (ref 70–99)
GLUCOSE BLDC GLUCOMTR-MCNC: 125 MG/DL (ref 70–99)
GLUCOSE BLDC GLUCOMTR-MCNC: 126 MG/DL (ref 70–99)
GLUCOSE BLDC GLUCOMTR-MCNC: 127 MG/DL (ref 70–99)
GLUCOSE BLDC GLUCOMTR-MCNC: 128 MG/DL (ref 70–99)
GLUCOSE BLDC GLUCOMTR-MCNC: 131 MG/DL (ref 70–99)
GLUCOSE BLDC GLUCOMTR-MCNC: 131 MG/DL (ref 70–99)
GLUCOSE BLDC GLUCOMTR-MCNC: 132 MG/DL (ref 70–99)
GLUCOSE BLDC GLUCOMTR-MCNC: 132 MG/DL (ref 70–99)
GLUCOSE BLDC GLUCOMTR-MCNC: 136 MG/DL (ref 70–99)
GLUCOSE BLDC GLUCOMTR-MCNC: 141 MG/DL (ref 70–99)
GLUCOSE BLDC GLUCOMTR-MCNC: 142 MG/DL (ref 70–99)
GLUCOSE BLDC GLUCOMTR-MCNC: 142 MG/DL (ref 70–99)
GLUCOSE BLDC GLUCOMTR-MCNC: 145 MG/DL (ref 70–99)
GLUCOSE BLDC GLUCOMTR-MCNC: 145 MG/DL (ref 70–99)
GLUCOSE BLDC GLUCOMTR-MCNC: 159 MG/DL (ref 70–99)
GLUCOSE BLDC GLUCOMTR-MCNC: 80 MG/DL (ref 70–99)
GLUCOSE BLDC GLUCOMTR-MCNC: 82 MG/DL (ref 70–99)
GLUCOSE BLDC GLUCOMTR-MCNC: 91 MG/DL (ref 70–99)
GLUCOSE BLDC GLUCOMTR-MCNC: 91 MG/DL (ref 70–99)
GLUCOSE BLDC GLUCOMTR-MCNC: 98 MG/DL (ref 70–99)
GLUCOSE BLDC GLUCOMTR-MCNC: 98 MG/DL (ref 70–99)
GLUCOSE BLDC GLUCOMTR-MCNC: 99 MG/DL (ref 70–99)
GLUCOSE SERPL-MCNC: 102 MG/DL (ref 70–99)
GLUCOSE SERPL-MCNC: 103 MG/DL (ref 70–99)
GLUCOSE SERPL-MCNC: 104 MG/DL (ref 70–99)
GLUCOSE SERPL-MCNC: 109 MG/DL (ref 70–99)
GLUCOSE SERPL-MCNC: 119 MG/DL (ref 70–99)
GLUCOSE SERPL-MCNC: 120 MG/DL (ref 70–99)
GLUCOSE SERPL-MCNC: 121 MG/DL (ref 70–99)
GLUCOSE SERPL-MCNC: 124 MG/DL (ref 70–99)
GLUCOSE SERPL-MCNC: 124 MG/DL (ref 70–99)
GLUCOSE SERPL-MCNC: 127 MG/DL (ref 70–99)
GLUCOSE SERPL-MCNC: 129 MG/DL (ref 70–99)
GLUCOSE SERPL-MCNC: 129 MG/DL (ref 70–99)
GLUCOSE SERPL-MCNC: 139 MG/DL (ref 70–99)
GLUCOSE SERPL-MCNC: 139 MG/DL (ref 70–99)
GLUCOSE SERPL-MCNC: 140 MG/DL (ref 70–99)
GLUCOSE SERPL-MCNC: 140 MG/DL (ref 70–99)
GLUCOSE SERPL-MCNC: 141 MG/DL (ref 70–99)
GLUCOSE SERPL-MCNC: 143 MG/DL (ref 70–99)
GLUCOSE SERPL-MCNC: 144 MG/DL (ref 70–99)
GLUCOSE SERPL-MCNC: 144 MG/DL (ref 70–99)
GLUCOSE SERPL-MCNC: 151 MG/DL (ref 70–99)
GLUCOSE SERPL-MCNC: 156 MG/DL (ref 70–99)
GLUCOSE SERPL-MCNC: 156 MG/DL (ref 70–99)
GLUCOSE SERPL-MCNC: 78 MG/DL (ref 70–99)
GLUCOSE SERPL-MCNC: 80 MG/DL (ref 70–99)
GLUCOSE SERPL-MCNC: 82 MG/DL (ref 70–99)
GLUCOSE SERPL-MCNC: 86 MG/DL (ref 70–99)
GLUCOSE SERPL-MCNC: 90 MG/DL (ref 70–99)
GLUCOSE SERPL-MCNC: 96 MG/DL (ref 70–99)
GLUCOSE SERPL-MCNC: 97 MG/DL (ref 70–99)
GLUCOSE UR STRIP-MCNC: NEGATIVE MG/DL
GLUCOSE UR STRIP-MCNC: NEGATIVE MG/DL
GRAM STN SPEC: ABNORMAL
HBA1C MFR BLD: 5.8 % (ref 0–5.6)
HCO3 BLD-SCNC: 12 MMOL/L (ref 21–28)
HCO3 BLD-SCNC: 13 MMOL/L (ref 21–28)
HCO3 BLD-SCNC: 15 MMOL/L (ref 21–28)
HCO3 BLD-SCNC: 17 MMOL/L (ref 21–28)
HCO3 BLD-SCNC: 17 MMOL/L (ref 21–28)
HCO3 BLD-SCNC: 18 MMOL/L (ref 21–28)
HCO3 BLD-SCNC: 18 MMOL/L (ref 21–28)
HCO3 BLD-SCNC: 19 MMOL/L (ref 21–28)
HCO3 BLD-SCNC: 20 MMOL/L (ref 21–28)
HCO3 BLD-SCNC: 21 MMOL/L (ref 21–28)
HCO3 BLD-SCNC: 22 MMOL/L (ref 21–28)
HCO3 BLD-SCNC: 22 MMOL/L (ref 21–28)
HCO3 BLD-SCNC: 23 MMOL/L (ref 21–28)
HCO3 BLD-SCNC: 24 MMOL/L (ref 21–28)
HCO3 BLD-SCNC: 25 MMOL/L (ref 21–28)
HCO3 BLD-SCNC: 25 MMOL/L (ref 21–28)
HCO3 BLD-SCNC: 26 MMOL/L (ref 21–28)
HCO3 BLD-SCNC: 27 MMOL/L (ref 21–28)
HCO3 BLD-SCNC: 29 MMOL/L (ref 21–28)
HCO3 BLD-SCNC: 29 MMOL/L (ref 21–28)
HCO3 BLD-SCNC: 30 MMOL/L (ref 21–28)
HCO3 BLD-SCNC: 31 MMOL/L (ref 21–28)
HCO3 BLD-SCNC: 32 MMOL/L (ref 21–28)
HCO3 BLD-SCNC: 33 MMOL/L (ref 21–28)
HCO3 BLD-SCNC: 34 MMOL/L (ref 21–28)
HCO3 BLD-SCNC: 34 MMOL/L (ref 21–28)
HCO3 BLD-SCNC: 35 MMOL/L (ref 21–28)
HCO3 BLD-SCNC: 36 MMOL/L (ref 21–28)
HCO3 BLD-SCNC: 36 MMOL/L (ref 21–28)
HCO3 BLD-SCNC: 37 MMOL/L (ref 21–28)
HCO3 BLD-SCNC: 38 MMOL/L (ref 21–28)
HCO3 BLD-SCNC: 38 MMOL/L (ref 21–28)
HCO3 BLD-SCNC: 39 MMOL/L (ref 21–28)
HCO3 BLD-SCNC: 40 MMOL/L (ref 21–28)
HCO3 BLDA-SCNC: 24 MMOL/L (ref 21–28)
HCO3 BLDA-SCNC: 30 MMOL/L (ref 21–28)
HCO3 BLDA-SCNC: 31 MMOL/L (ref 21–28)
HCO3 BLDA-SCNC: 36 MMOL/L (ref 21–28)
HCO3 BLDA-SCNC: 37 MMOL/L (ref 21–28)
HCO3 BLDA-SCNC: 38 MMOL/L (ref 21–28)
HCO3 BLDA-SCNC: 39 MMOL/L (ref 21–28)
HCO3 BLDA-SCNC: 41 MMOL/L (ref 21–28)
HCO3 BLDV-SCNC: 21 MMOL/L (ref 21–28)
HCO3 BLDV-SCNC: 21 MMOL/L (ref 21–28)
HCO3 BLDV-SCNC: 23 MMOL/L (ref 21–28)
HCO3 BLDV-SCNC: 23 MMOL/L (ref 21–28)
HCO3 BLDV-SCNC: 26 MMOL/L (ref 21–28)
HCO3 BLDV-SCNC: 27 MMOL/L (ref 21–28)
HCO3 BLDV-SCNC: 27 MMOL/L (ref 21–28)
HCO3 BLDV-SCNC: 28 MMOL/L (ref 21–28)
HCO3 BLDV-SCNC: 31 MMOL/L (ref 21–28)
HCO3 BLDV-SCNC: 31 MMOL/L (ref 21–28)
HCO3 BLDV-SCNC: 34 MMOL/L (ref 21–28)
HCO3 BLDV-SCNC: 37 MMOL/L (ref 21–28)
HCO3 BLDV-SCNC: 37 MMOL/L (ref 21–28)
HCO3 BLDV-SCNC: 40 MMOL/L (ref 21–28)
HCO3 BLDV-SCNC: 41 MMOL/L (ref 21–28)
HCT VFR BLD AUTO: 23.7 % (ref 40–53)
HCT VFR BLD AUTO: 23.9 % (ref 40–53)
HCT VFR BLD AUTO: 24.5 % (ref 40–53)
HCT VFR BLD AUTO: 24.5 % (ref 40–53)
HCT VFR BLD AUTO: 24.6 % (ref 40–53)
HCT VFR BLD AUTO: 24.9 % (ref 40–53)
HCT VFR BLD AUTO: 25.5 % (ref 40–53)
HCT VFR BLD AUTO: 25.6 % (ref 40–53)
HCT VFR BLD AUTO: 25.9 % (ref 40–53)
HCT VFR BLD AUTO: 26.4 % (ref 40–53)
HCT VFR BLD AUTO: 26.9 % (ref 40–53)
HCT VFR BLD AUTO: 27 % (ref 40–53)
HCT VFR BLD AUTO: 27.1 % (ref 40–53)
HCT VFR BLD AUTO: 28.1 % (ref 40–53)
HCT VFR BLD AUTO: 28.5 % (ref 40–53)
HCT VFR BLD AUTO: 28.7 % (ref 40–53)
HCT VFR BLD AUTO: 28.8 % (ref 40–53)
HCT VFR BLD AUTO: 29.5 % (ref 40–53)
HCT VFR BLD AUTO: 42.3 % (ref 40–53)
HCT VFR BLD AUTO: 43.8 % (ref 40–53)
HCT VFR BLD AUTO: 45.8 % (ref 40–53)
HCT VFR BLD AUTO: 46.6 % (ref 40–53)
HCT VFR BLD AUTO: 46.6 % (ref 40–53)
HCT VFR BLD AUTO: 48.1 % (ref 40–53)
HCT VFR BLD AUTO: 48.9 % (ref 40–53)
HCT VFR BLD AUTO: 49.2 % (ref 40–53)
HCT VFR BLD AUTO: 49.8 % (ref 40–53)
HCT VFR BLD AUTO: 52.8 % (ref 40–53)
HCT VFR BLD AUTO: 57.3 % (ref 40–53)
HCT VFR BLD AUTO: 58.1 % (ref 40–53)
HCT VFR BLD AUTO: 58.6 % (ref 40–53)
HCT VFR BLD AUTO: 59.5 % (ref 40–53)
HCT VFR BLD AUTO: 59.7 % (ref 40–53)
HGB BLD-MCNC: 10 G/DL (ref 13.3–17.7)
HGB BLD-MCNC: 10.2 G/DL (ref 13.3–17.7)
HGB BLD-MCNC: 10.2 G/DL (ref 13.3–17.7)
HGB BLD-MCNC: 10.4 G/DL (ref 13.3–17.7)
HGB BLD-MCNC: 10.4 G/DL (ref 13.3–17.7)
HGB BLD-MCNC: 11.1 G/DL (ref 13.3–17.7)
HGB BLD-MCNC: 11.5 G/DL (ref 13.3–17.7)
HGB BLD-MCNC: 11.7 G/DL (ref 13.3–17.7)
HGB BLD-MCNC: 11.9 G/DL (ref 13.3–17.7)
HGB BLD-MCNC: 12.1 G/DL (ref 13.3–17.7)
HGB BLD-MCNC: 13 G/DL (ref 13.3–17.7)
HGB BLD-MCNC: 13.5 G/DL (ref 13.3–17.7)
HGB BLD-MCNC: 14.2 G/DL (ref 13.3–17.7)
HGB BLD-MCNC: 14.6 G/DL (ref 13.3–17.7)
HGB BLD-MCNC: 14.7 G/DL (ref 13.3–17.7)
HGB BLD-MCNC: 15 G/DL (ref 13.3–17.7)
HGB BLD-MCNC: 15.5 G/DL (ref 13.3–17.7)
HGB BLD-MCNC: 15.9 G/DL (ref 13.3–17.7)
HGB BLD-MCNC: 16.3 G/DL (ref 13.3–17.7)
HGB BLD-MCNC: 16.4 G/DL (ref 13.3–17.7)
HGB BLD-MCNC: 17.4 G/DL (ref 13.3–17.7)
HGB BLD-MCNC: 18.8 G/DL (ref 13.3–17.7)
HGB BLD-MCNC: 19.2 G/DL (ref 13.3–17.7)
HGB BLD-MCNC: 19.6 G/DL (ref 13.3–17.7)
HGB BLD-MCNC: 19.7 G/DL (ref 13.3–17.7)
HGB BLD-MCNC: 19.7 G/DL (ref 13.3–17.7)
HGB BLD-MCNC: 4.3 G/DL (ref 13.3–17.7)
HGB BLD-MCNC: 5.8 G/DL (ref 13.3–17.7)
HGB BLD-MCNC: 6.1 G/DL (ref 13.3–17.7)
HGB BLD-MCNC: 7.6 G/DL (ref 13.3–17.7)
HGB BLD-MCNC: 7.6 G/DL (ref 13.3–17.7)
HGB BLD-MCNC: 7.8 G/DL (ref 13.3–17.7)
HGB BLD-MCNC: 8.1 G/DL (ref 13.3–17.7)
HGB BLD-MCNC: 8.1 G/DL (ref 13.3–17.7)
HGB BLD-MCNC: 8.2 G/DL (ref 13.3–17.7)
HGB BLD-MCNC: 8.2 G/DL (ref 13.3–17.7)
HGB BLD-MCNC: 8.3 G/DL (ref 13.3–17.7)
HGB BLD-MCNC: 8.4 G/DL (ref 13.3–17.7)
HGB BLD-MCNC: 8.5 G/DL (ref 13.3–17.7)
HGB BLD-MCNC: 8.6 G/DL (ref 13.3–17.7)
HGB BLD-MCNC: 8.7 G/DL (ref 13.3–17.7)
HGB BLD-MCNC: 8.8 G/DL (ref 13.3–17.7)
HGB BLD-MCNC: 8.8 G/DL (ref 13.3–17.7)
HGB BLD-MCNC: 8.9 G/DL (ref 13.3–17.7)
HGB BLD-MCNC: 9.1 G/DL (ref 13.3–17.7)
HGB BLD-MCNC: 9.3 G/DL (ref 13.3–17.7)
HGB BLD-MCNC: 9.4 G/DL (ref 13.3–17.7)
HGB BLD-MCNC: 9.4 G/DL (ref 13.3–17.7)
HGB BLD-MCNC: 9.6 G/DL (ref 13.3–17.7)
HGB FREE PLAS-MCNC: 30 MG/DL
HGB FREE PLAS-MCNC: 40 MG/DL
HGB UR QL STRIP: ABNORMAL
HGB UR QL STRIP: NEGATIVE
HYALINE CASTS #/AREA URNS LPF: 1 /LPF (ref 0–2)
IMM GRANULOCYTES # BLD: 0 10E9/L (ref 0–0.4)
IMM GRANULOCYTES # BLD: 0.1 10E9/L (ref 0–0.4)
IMM GRANULOCYTES NFR BLD: 0.6 %
IMM GRANULOCYTES NFR BLD: 1.1 %
INR PPP: 0.91 (ref 0.86–1.14)
INR PPP: 0.92 (ref 0.86–1.14)
INR PPP: 0.93 (ref 0.86–1.14)
INR PPP: 0.93 (ref 0.86–1.14)
INR PPP: 1.09 (ref 0.86–1.14)
INR PPP: 1.28 (ref 0.86–1.14)
INR PPP: 1.31 (ref 0.86–1.14)
INR PPP: 1.33 (ref 0.86–1.14)
INR PPP: 1.33 (ref 0.86–1.14)
INR PPP: 1.35 (ref 0.86–1.14)
INR PPP: 1.38 (ref 0.86–1.14)
INR PPP: 1.38 (ref 0.86–1.14)
INR PPP: 1.43 (ref 0.86–1.14)
INR PPP: 1.43 (ref 0.86–1.14)
INR PPP: 1.44 (ref 0.86–1.14)
INR PPP: 1.44 (ref 0.86–1.14)
INR PPP: 1.45 (ref 0.86–1.14)
INR PPP: 1.45 (ref 0.86–1.14)
INR PPP: 1.46 (ref 0.86–1.14)
INR PPP: 1.46 (ref 0.86–1.14)
INR PPP: 1.47 (ref 0.86–1.14)
INR PPP: 1.49 (ref 0.86–1.14)
INR PPP: 1.49 (ref 0.86–1.14)
INR PPP: 1.5 (ref 0.86–1.14)
INR PPP: 1.52 (ref 0.86–1.14)
INR PPP: 1.55 (ref 0.86–1.14)
INR PPP: 1.56 (ref 0.86–1.14)
INR PPP: 1.64 (ref 0.86–1.14)
INR PPP: 1.91 (ref 0.86–1.14)
INR PPP: 4.27 (ref 0.86–1.14)
INTERPRETATION ECG - MUSE: NORMAL
K TIME TO SPEC CLOT STRENGTH: 1.2 MIN (ref 1–3)
K TIME TO SPEC CLOT STRENGTH: 1.9 MINUTE (ref 1–3)
K TIME TO SPEC CLOT STRENGTH: 2.1 MIN (ref 1–3)
K TIME TO SPEC CLOT STRENGTH: 3.5 MIN (ref 1–3)
K TIME TO SPEC CLOT STRENGTH: 7.4 MIN (ref 1–3)
K TIME TO SPEC CLOT STRENGTH: ABNORMAL MIN (ref 1–3)
KCT BLD-ACNC: 126 SEC (ref 75–150)
KCT BLD-ACNC: 130 SEC (ref 75–150)
KCT BLD-ACNC: 130 SEC (ref 75–150)
KCT BLD-ACNC: 134 SEC (ref 75–150)
KCT BLD-ACNC: 134 SEC (ref 75–150)
KCT BLD-ACNC: 138 SEC (ref 75–150)
KCT BLD-ACNC: 142 SEC (ref 75–150)
KCT BLD-ACNC: 146 SEC (ref 75–150)
KCT BLD-ACNC: 150 SEC (ref 75–150)
KCT BLD-ACNC: 150 SEC (ref 75–150)
KCT BLD-ACNC: 155 SEC (ref 75–150)
KCT BLD-ACNC: 158 SEC (ref 75–150)
KCT BLD-ACNC: 162 SEC (ref 75–150)
KCT BLD-ACNC: 163 SEC (ref 75–150)
KCT BLD-ACNC: 163 SEC (ref 75–150)
KCT BLD-ACNC: 166 SEC (ref 75–150)
KCT BLD-ACNC: 167 SEC (ref 75–150)
KCT BLD-ACNC: 175 SEC (ref 75–150)
KETONES UR STRIP-MCNC: 5 MG/DL
KETONES UR STRIP-MCNC: NEGATIVE MG/DL
KOH PREP SPEC: NORMAL
LACTATE BLD-SCNC: 1 MMOL/L (ref 0.7–2)
LACTATE BLD-SCNC: 1.1 MMOL/L (ref 0.7–2)
LACTATE BLD-SCNC: 1.1 MMOL/L (ref 0.7–2)
LACTATE BLD-SCNC: 1.2 MMOL/L (ref 0.7–2)
LACTATE BLD-SCNC: 1.2 MMOL/L (ref 0.7–2)
LACTATE BLD-SCNC: 1.3 MMOL/L (ref 0.7–2)
LACTATE BLD-SCNC: 1.3 MMOL/L (ref 0.7–2)
LACTATE BLD-SCNC: 1.4 MMOL/L (ref 0.7–2)
LACTATE BLD-SCNC: 1.4 MMOL/L (ref 0.7–2)
LACTATE BLD-SCNC: 1.5 MMOL/L (ref 0.7–2)
LACTATE BLD-SCNC: 1.6 MMOL/L (ref 0.7–2)
LACTATE BLD-SCNC: 1.8 MMOL/L (ref 0.7–2)
LACTATE BLD-SCNC: 1.8 MMOL/L (ref 0.7–2)
LACTATE BLD-SCNC: 1.9 MMOL/L (ref 0.7–2)
LACTATE BLD-SCNC: 1.9 MMOL/L (ref 0.7–2)
LACTATE BLD-SCNC: 10.5 MMOL/L (ref 0.7–2)
LACTATE BLD-SCNC: 10.7 MMOL/L (ref 0.7–2)
LACTATE BLD-SCNC: 11.4 MMOL/L (ref 0.7–2)
LACTATE BLD-SCNC: 12.9 MMOL/L (ref 0.7–2)
LACTATE BLD-SCNC: 14 MMOL/L (ref 0.7–2)
LACTATE BLD-SCNC: 3 MMOL/L (ref 0.7–2)
LACTATE BLD-SCNC: 3.9 MMOL/L (ref 0.7–2)
LACTATE BLD-SCNC: 4.3 MMOL/L (ref 0.7–2)
LACTATE BLD-SCNC: 4.9 MMOL/L (ref 0.7–2)
LACTATE BLD-SCNC: 5.2 MMOL/L (ref 0.7–2)
LACTATE BLD-SCNC: 5.8 MMOL/L (ref 0.7–2)
LACTATE BLD-SCNC: 7.1 MMOL/L (ref 0.7–2)
LACTATE BLD-SCNC: 7.5 MMOL/L (ref 0.7–2)
LACTATE BLD-SCNC: 7.7 MMOL/L (ref 0.7–2)
LACTATE BLD-SCNC: 8 MMOL/L (ref 0.7–2)
LACTATE BLD-SCNC: 8.2 MMOL/L (ref 0.7–2)
LACTATE BLD-SCNC: 8.4 MMOL/L (ref 0.7–2)
LACTATE BLD-SCNC: 8.6 MMOL/L (ref 0.7–2)
LACTATE BLD-SCNC: 9.7 MMOL/L (ref 0.7–2)
LACTATE BLD-SCNC: 9.8 MMOL/L (ref 0.7–2)
LACTATE BLD-SCNC: 9.8 MMOL/L (ref 0.7–2)
LEUKOCYTE ESTERASE UR QL STRIP: ABNORMAL
LEUKOCYTE ESTERASE UR QL STRIP: NEGATIVE
LMWH PPP CHRO-ACNC: 0.14 IU/ML
LMWH PPP CHRO-ACNC: 0.16 IU/ML
LMWH PPP CHRO-ACNC: 0.17 IU/ML
LMWH PPP CHRO-ACNC: 0.18 IU/ML
LMWH PPP CHRO-ACNC: 0.18 IU/ML
LMWH PPP CHRO-ACNC: 0.2 IU/ML
LMWH PPP CHRO-ACNC: 0.21 IU/ML
LMWH PPP CHRO-ACNC: 0.29 IU/ML
LMWH PPP CHRO-ACNC: 0.31 IU/ML
LMWH PPP CHRO-ACNC: <0.1 IU/ML
LY30 LYSIS AT 30 MINUTES: 0 % (ref 0–8)
LY60 LYSIS AT 60 MINUTES: 0 % (ref 0–15)
LY60 LYSIS AT 60 MINUTES: 0.7 % (ref 0–15)
LY60 LYSIS AT 60 MINUTES: 4.6 % (ref 0–15)
LY60 LYSIS AT 60 MINUTES: ABNORMAL % (ref 0–15)
LYMPHOCYTES # BLD AUTO: 0.3 10E9/L (ref 0.8–5.3)
LYMPHOCYTES # BLD AUTO: 2.3 10E9/L (ref 0.8–5.3)
LYMPHOCYTES NFR BLD AUTO: 12.1 %
LYMPHOCYTES NFR BLD AUTO: 9.6 %
Lab: ABNORMAL
Lab: ABNORMAL
Lab: NORMAL
Lab: NORMAL
MA MAXIMUM CLOT STRENGTH: 37.5 MM (ref 55–74)
MA MAXIMUM CLOT STRENGTH: 47.9 MM (ref 55–74)
MA MAXIMUM CLOT STRENGTH: 54.4 MM (ref 50–70)
MA MAXIMUM CLOT STRENGTH: 57.6 MM (ref 55–74)
MA MAXIMUM CLOT STRENGTH: 59.4 MM (ref 55–74)
MA MAXIMUM CLOT STRENGTH: ABNORMAL MM (ref 55–74)
MAGNESIUM SERPL-MCNC: 2 MG/DL (ref 1.6–2.3)
MAGNESIUM SERPL-MCNC: 2.1 MG/DL (ref 1.6–2.3)
MAGNESIUM SERPL-MCNC: 2.2 MG/DL (ref 1.6–2.3)
MAGNESIUM SERPL-MCNC: 2.3 MG/DL (ref 1.6–2.3)
MAGNESIUM SERPL-MCNC: 2.3 MG/DL (ref 1.6–2.3)
MAGNESIUM SERPL-MCNC: 2.4 MG/DL (ref 1.6–2.3)
MAGNESIUM SERPL-MCNC: 2.4 MG/DL (ref 1.6–2.3)
MAGNESIUM SERPL-MCNC: 2.5 MG/DL (ref 1.6–2.3)
MAGNESIUM SERPL-MCNC: 2.6 MG/DL (ref 1.6–2.3)
MAGNESIUM SERPL-MCNC: 2.6 MG/DL (ref 1.6–2.3)
MAGNESIUM SERPL-MCNC: 2.7 MG/DL (ref 1.6–2.3)
MAGNESIUM SERPL-MCNC: 2.7 MG/DL (ref 1.6–2.3)
MAGNESIUM SERPL-MCNC: 2.8 MG/DL (ref 1.6–2.3)
MCH RBC QN AUTO: 29 PG (ref 26.5–33)
MCH RBC QN AUTO: 29.1 PG (ref 26.5–33)
MCH RBC QN AUTO: 29.2 PG (ref 26.5–33)
MCH RBC QN AUTO: 29.4 PG (ref 26.5–33)
MCH RBC QN AUTO: 29.5 PG (ref 26.5–33)
MCH RBC QN AUTO: 29.8 PG (ref 26.5–33)
MCH RBC QN AUTO: 29.9 PG (ref 26.5–33)
MCH RBC QN AUTO: 30 PG (ref 26.5–33)
MCH RBC QN AUTO: 30.1 PG (ref 26.5–33)
MCH RBC QN AUTO: 30.2 PG (ref 26.5–33)
MCH RBC QN AUTO: 30.3 PG (ref 26.5–33)
MCH RBC QN AUTO: 30.4 PG (ref 26.5–33)
MCH RBC QN AUTO: 30.5 PG (ref 26.5–33)
MCH RBC QN AUTO: 30.8 PG (ref 26.5–33)
MCHC RBC AUTO-ENTMCNC: 31.1 G/DL (ref 31.5–36.5)
MCHC RBC AUTO-ENTMCNC: 31.5 G/DL (ref 31.5–36.5)
MCHC RBC AUTO-ENTMCNC: 31.8 G/DL (ref 31.5–36.5)
MCHC RBC AUTO-ENTMCNC: 31.9 G/DL (ref 31.5–36.5)
MCHC RBC AUTO-ENTMCNC: 31.9 G/DL (ref 31.5–36.5)
MCHC RBC AUTO-ENTMCNC: 32 G/DL (ref 31.5–36.5)
MCHC RBC AUTO-ENTMCNC: 32.2 G/DL (ref 31.5–36.5)
MCHC RBC AUTO-ENTMCNC: 32.3 G/DL (ref 31.5–36.5)
MCHC RBC AUTO-ENTMCNC: 32.4 G/DL (ref 31.5–36.5)
MCHC RBC AUTO-ENTMCNC: 32.4 G/DL (ref 31.5–36.5)
MCHC RBC AUTO-ENTMCNC: 32.5 G/DL (ref 31.5–36.5)
MCHC RBC AUTO-ENTMCNC: 32.7 G/DL (ref 31.5–36.5)
MCHC RBC AUTO-ENTMCNC: 32.8 G/DL (ref 31.5–36.5)
MCHC RBC AUTO-ENTMCNC: 32.8 G/DL (ref 31.5–36.5)
MCHC RBC AUTO-ENTMCNC: 32.9 G/DL (ref 31.5–36.5)
MCHC RBC AUTO-ENTMCNC: 33 G/DL (ref 31.5–36.5)
MCHC RBC AUTO-ENTMCNC: 33.1 G/DL (ref 31.5–36.5)
MCHC RBC AUTO-ENTMCNC: 33.1 G/DL (ref 31.5–36.5)
MCHC RBC AUTO-ENTMCNC: 33.3 G/DL (ref 31.5–36.5)
MCHC RBC AUTO-ENTMCNC: 33.3 G/DL (ref 31.5–36.5)
MCHC RBC AUTO-ENTMCNC: 33.4 G/DL (ref 31.5–36.5)
MCHC RBC AUTO-ENTMCNC: 33.5 G/DL (ref 31.5–36.5)
MCHC RBC AUTO-ENTMCNC: 33.6 G/DL (ref 31.5–36.5)
MCHC RBC AUTO-ENTMCNC: 33.7 G/DL (ref 31.5–36.5)
MCHC RBC AUTO-ENTMCNC: 34.3 G/DL (ref 31.5–36.5)
MCHC RBC AUTO-ENTMCNC: 34.6 G/DL (ref 31.5–36.5)
MCHC RBC AUTO-ENTMCNC: 34.8 G/DL (ref 31.5–36.5)
MCV RBC AUTO: 87 FL (ref 78–100)
MCV RBC AUTO: 88 FL (ref 78–100)
MCV RBC AUTO: 89 FL (ref 78–100)
MCV RBC AUTO: 89 FL (ref 78–100)
MCV RBC AUTO: 90 FL (ref 78–100)
MCV RBC AUTO: 91 FL (ref 78–100)
MCV RBC AUTO: 92 FL (ref 78–100)
MCV RBC AUTO: 93 FL (ref 78–100)
MCV RBC AUTO: 94 FL (ref 78–100)
MCV RBC AUTO: 95 FL (ref 78–100)
MCV RBC AUTO: 95 FL (ref 78–100)
MCV RBC AUTO: 96 FL (ref 78–100)
MONOCYTES # BLD AUTO: 0.2 10E9/L (ref 0–1.3)
MONOCYTES # BLD AUTO: 2.3 10E9/L (ref 0–1.3)
MONOCYTES NFR BLD AUTO: 11.7 %
MONOCYTES NFR BLD AUTO: 7.3 %
MRSA DNA SPEC QL NAA+PROBE: NEGATIVE
MUCOUS THREADS #/AREA URNS LPF: PRESENT /LPF
MUCOUS THREADS #/AREA URNS LPF: PRESENT /LPF
NEUTROPHILS # BLD AUTO: 14.5 10E9/L (ref 1.6–8.3)
NEUTROPHILS # BLD AUTO: 2.1 10E9/L (ref 1.6–8.3)
NEUTROPHILS NFR BLD AUTO: 75 %
NEUTROPHILS NFR BLD AUTO: 81.6 %
NITRATE UR QL: NEGATIVE
NITRATE UR QL: NEGATIVE
NRBC # BLD AUTO: 0 10*3/UL
NRBC # BLD AUTO: 0 10*3/UL
NRBC BLD AUTO-RTO: 0 /100
NRBC BLD AUTO-RTO: 0 /100
NUM BPU REQUESTED: 1
NUM BPU REQUESTED: 100
NUM BPU REQUESTED: 2
NUM BPU REQUESTED: 27
NUM BPU REQUESTED: 4
NUM BPU REQUESTED: 50
NUM BPU REQUESTED: 63
NUM BPU REQUESTED: 7
O2/TOTAL GAS SETTING VFR VENT: 100 %
O2/TOTAL GAS SETTING VFR VENT: 40 %
O2/TOTAL GAS SETTING VFR VENT: 50 %
O2/TOTAL GAS SETTING VFR VENT: 60 %
O2/TOTAL GAS SETTING VFR VENT: 70 %
O2/TOTAL GAS SETTING VFR VENT: 75 %
O2/TOTAL GAS SETTING VFR VENT: 80 %
O2/TOTAL GAS SETTING VFR VENT: 90 %
O2/TOTAL GAS SETTING VFR VENT: 90 %
O2/TOTAL GAS SETTING VFR VENT: ABNORMAL %
O2/TOTAL GAS SETTING VFR VENT: NORMAL %
OXYHGB MFR BLD: 100 % (ref 92–100)
OXYHGB MFR BLD: 89 % (ref 92–100)
OXYHGB MFR BLD: 89 % (ref 92–100)
OXYHGB MFR BLD: 91 % (ref 92–100)
OXYHGB MFR BLD: 92 % (ref 92–100)
OXYHGB MFR BLD: 92 % (ref 92–100)
OXYHGB MFR BLD: 93 % (ref 92–100)
OXYHGB MFR BLD: 94 % (ref 92–100)
OXYHGB MFR BLD: 95 % (ref 92–100)
OXYHGB MFR BLD: 96 % (ref 92–100)
OXYHGB MFR BLD: 97 % (ref 92–100)
OXYHGB MFR BLD: 98 % (ref 92–100)
OXYHGB MFR BLD: 99 % (ref 92–100)
OXYHGB MFR BLDA: 96 % (ref 75–100)
OXYHGB MFR BLDA: 96 % (ref 75–100)
OXYHGB MFR BLDA: 97 % (ref 75–100)
OXYHGB MFR BLDA: 97 % (ref 75–100)
OXYHGB MFR BLDA: 98 % (ref 75–100)
OXYHGB MFR BLDA: 99 % (ref 75–100)
OXYHGB MFR BLDV: 62 %
OXYHGB MFR BLDV: 66 %
OXYHGB MFR BLDV: 67 %
OXYHGB MFR BLDV: 69 %
OXYHGB MFR BLDV: 71 %
OXYHGB MFR BLDV: 72 %
OXYHGB MFR BLDV: 72 %
OXYHGB MFR BLDV: 73 %
OXYHGB MFR BLDV: 74 %
OXYHGB MFR BLDV: 76 %
PCO2 BLD: 30 MM HG (ref 35–45)
PCO2 BLD: 30 MM HG (ref 35–45)
PCO2 BLD: 31 MM HG (ref 35–45)
PCO2 BLD: 32 MM HG (ref 35–45)
PCO2 BLD: 33 MM HG (ref 35–45)
PCO2 BLD: 34 MM HG (ref 35–45)
PCO2 BLD: 35 MM HG (ref 35–45)
PCO2 BLD: 36 MM HG (ref 35–45)
PCO2 BLD: 36 MM HG (ref 35–45)
PCO2 BLD: 37 MM HG (ref 35–45)
PCO2 BLD: 38 MM HG (ref 35–45)
PCO2 BLD: 39 MM HG (ref 35–45)
PCO2 BLD: 40 MM HG (ref 35–45)
PCO2 BLD: 41 MM HG (ref 35–45)
PCO2 BLD: 41 MM HG (ref 35–45)
PCO2 BLD: 42 MM HG (ref 35–45)
PCO2 BLD: 43 MM HG (ref 35–45)
PCO2 BLD: 44 MM HG (ref 35–45)
PCO2 BLD: 45 MM HG (ref 35–45)
PCO2 BLD: 46 MM HG (ref 35–45)
PCO2 BLD: 47 MM HG (ref 35–45)
PCO2 BLD: 48 MM HG (ref 35–45)
PCO2 BLD: 49 MM HG (ref 35–45)
PCO2 BLD: 50 MM HG (ref 35–45)
PCO2 BLD: 51 MM HG (ref 35–45)
PCO2 BLD: 52 MM HG (ref 35–45)
PCO2 BLD: 52 MM HG (ref 35–45)
PCO2 BLD: 54 MM HG (ref 35–45)
PCO2 BLD: 54 MM HG (ref 35–45)
PCO2 BLD: 55 MM HG (ref 35–45)
PCO2 BLD: 57 MM HG (ref 35–45)
PCO2 BLD: 57 MM HG (ref 35–45)
PCO2 BLD: 59 MM HG (ref 35–45)
PCO2 BLD: 60 MM HG (ref 35–45)
PCO2 BLD: 62 MM HG (ref 35–45)
PCO2 BLD: 63 MM HG (ref 35–45)
PCO2 BLDA: 37 MM HG (ref 35–45)
PCO2 BLDA: 40 MM HG (ref 35–45)
PCO2 BLDA: 41 MM HG (ref 35–45)
PCO2 BLDA: 43 MM HG (ref 35–45)
PCO2 BLDA: 56 MM HG (ref 35–45)
PCO2 BLDA: 63 MM HG (ref 35–45)
PCO2 BLDA: 63 MM HG (ref 35–45)
PCO2 BLDA: 73 MM HG (ref 35–45)
PCO2 BLDV: 40 MM HG (ref 40–50)
PCO2 BLDV: 42 MM HG (ref 40–50)
PCO2 BLDV: 42 MM HG (ref 40–50)
PCO2 BLDV: 43 MM HG (ref 40–50)
PCO2 BLDV: 43 MM HG (ref 40–50)
PCO2 BLDV: 44 MM HG (ref 40–50)
PCO2 BLDV: 45 MM HG (ref 40–50)
PCO2 BLDV: 47 MM HG (ref 40–50)
PCO2 BLDV: 47 MM HG (ref 40–50)
PCO2 BLDV: 48 MM HG (ref 40–50)
PCO2 BLDV: 50 MM HG (ref 40–50)
PCO2 BLDV: 58 MM HG (ref 40–50)
PCO2 BLDV: 58 MM HG (ref 40–50)
PCO2 BLDV: 63 MM HG (ref 40–50)
PCO2 BLDV: 69 MM HG (ref 40–50)
PH BLD: 7.11 PH (ref 7.35–7.45)
PH BLD: 7.14 PH (ref 7.35–7.45)
PH BLD: 7.23 PH (ref 7.35–7.45)
PH BLD: 7.26 PH (ref 7.35–7.45)
PH BLD: 7.28 PH (ref 7.35–7.45)
PH BLD: 7.29 PH (ref 7.35–7.45)
PH BLD: 7.29 PH (ref 7.35–7.45)
PH BLD: 7.3 PH (ref 7.35–7.45)
PH BLD: 7.3 PH (ref 7.35–7.45)
PH BLD: 7.33 PH (ref 7.35–7.45)
PH BLD: 7.34 PH (ref 7.35–7.45)
PH BLD: 7.36 PH (ref 7.35–7.45)
PH BLD: 7.37 PH (ref 7.35–7.45)
PH BLD: 7.37 PH (ref 7.35–7.45)
PH BLD: 7.39 PH (ref 7.35–7.45)
PH BLD: 7.4 PH (ref 7.35–7.45)
PH BLD: 7.41 PH (ref 7.35–7.45)
PH BLD: 7.42 PH (ref 7.35–7.45)
PH BLD: 7.43 PH (ref 7.35–7.45)
PH BLD: 7.44 PH (ref 7.35–7.45)
PH BLD: 7.45 PH (ref 7.35–7.45)
PH BLD: 7.46 PH (ref 7.35–7.45)
PH BLD: 7.47 PH (ref 7.35–7.45)
PH BLD: 7.48 PH (ref 7.35–7.45)
PH BLD: 7.5 PH (ref 7.35–7.45)
PH BLD: 7.51 PH (ref 7.35–7.45)
PH BLD: 7.52 PH (ref 7.35–7.45)
PH BLD: 7.53 PH (ref 7.35–7.45)
PH BLD: 7.56 PH (ref 7.35–7.45)
PH BLD: 7.57 PH (ref 7.35–7.45)
PH BLD: 7.57 PH (ref 7.35–7.45)
PH BLD: 7.61 PH (ref 7.35–7.45)
PH BLDA: 7.36 PH (ref 7.35–7.45)
PH BLDA: 7.37 PH (ref 7.35–7.45)
PH BLDA: 7.41 PH (ref 7.35–7.45)
PH BLDA: 7.42 PH (ref 7.35–7.45)
PH BLDA: 7.42 PH (ref 7.35–7.45)
PH BLDA: 7.48 PH (ref 7.35–7.45)
PH BLDA: 7.48 PH (ref 7.35–7.45)
PH BLDA: 7.58 PH (ref 7.35–7.45)
PH BLDV: 7.3 PH (ref 7.32–7.43)
PH BLDV: 7.31 PH (ref 7.32–7.43)
PH BLDV: 7.33 PH (ref 7.32–7.43)
PH BLDV: 7.35 PH (ref 7.32–7.43)
PH BLDV: 7.38 PH (ref 7.32–7.43)
PH BLDV: 7.41 PH (ref 7.32–7.43)
PH BLDV: 7.41 PH (ref 7.32–7.43)
PH BLDV: 7.44 PH (ref 7.32–7.43)
PH BLDV: 7.45 PH (ref 7.32–7.43)
PH BLDV: 7.45 PH (ref 7.32–7.43)
PH BLDV: 7.54 PH (ref 7.32–7.43)
PH UR STRIP: 5 PH (ref 5–7)
PH UR STRIP: 5.5 PH (ref 5–7)
PHOSPHATE SERPL-MCNC: 1.5 MG/DL (ref 2.5–4.5)
PHOSPHATE SERPL-MCNC: 2 MG/DL (ref 2.5–4.5)
PHOSPHATE SERPL-MCNC: 2.1 MG/DL (ref 2.5–4.5)
PHOSPHATE SERPL-MCNC: 2.3 MG/DL (ref 2.5–4.5)
PHOSPHATE SERPL-MCNC: 2.3 MG/DL (ref 2.5–4.5)
PHOSPHATE SERPL-MCNC: 2.4 MG/DL (ref 2.5–4.5)
PHOSPHATE SERPL-MCNC: 2.5 MG/DL (ref 2.5–4.5)
PHOSPHATE SERPL-MCNC: 2.8 MG/DL (ref 2.5–4.5)
PHOSPHATE SERPL-MCNC: 3.1 MG/DL (ref 2.5–4.5)
PHOSPHATE SERPL-MCNC: 3.2 MG/DL (ref 2.5–4.5)
PHOSPHATE SERPL-MCNC: 3.3 MG/DL (ref 2.5–4.5)
PHOSPHATE SERPL-MCNC: 3.6 MG/DL (ref 2.5–4.5)
PHOSPHATE SERPL-MCNC: 4.6 MG/DL (ref 2.5–4.5)
PHOSPHATE SERPL-MCNC: 4.7 MG/DL (ref 2.5–4.5)
PHOSPHATE SERPL-MCNC: 5.2 MG/DL (ref 2.5–4.5)
PHOSPHATE SERPL-MCNC: 5.5 MG/DL (ref 2.5–4.5)
PHOSPHATE SERPL-MCNC: 7.6 MG/DL (ref 2.5–4.5)
PLATELET # BLD AUTO: 106 10E9/L (ref 150–450)
PLATELET # BLD AUTO: 125 10E9/L (ref 150–450)
PLATELET # BLD AUTO: 128 10E9/L (ref 150–450)
PLATELET # BLD AUTO: 130 10E9/L (ref 150–450)
PLATELET # BLD AUTO: 135 10E9/L (ref 150–450)
PLATELET # BLD AUTO: 135 10E9/L (ref 150–450)
PLATELET # BLD AUTO: 141 10E9/L (ref 150–450)
PLATELET # BLD AUTO: 141 10E9/L (ref 150–450)
PLATELET # BLD AUTO: 144 10E9/L (ref 150–450)
PLATELET # BLD AUTO: 149 10E9/L (ref 150–450)
PLATELET # BLD AUTO: 158 10E9/L (ref 150–450)
PLATELET # BLD AUTO: 160 10E9/L (ref 150–450)
PLATELET # BLD AUTO: 164 10E9/L (ref 150–450)
PLATELET # BLD AUTO: 169 10E9/L (ref 150–450)
PLATELET # BLD AUTO: 170 10E9/L (ref 150–450)
PLATELET # BLD AUTO: 178 10E9/L (ref 150–450)
PLATELET # BLD AUTO: 186 10E9/L (ref 150–450)
PLATELET # BLD AUTO: 23 10E9/L (ref 150–450)
PLATELET # BLD AUTO: 67 10E9/L (ref 150–450)
PLATELET # BLD AUTO: 68 10E9/L (ref 150–450)
PLATELET # BLD AUTO: 69 10E9/L (ref 150–450)
PLATELET # BLD AUTO: 76 10E9/L (ref 150–450)
PLATELET # BLD AUTO: 78 10E9/L (ref 150–450)
PLATELET # BLD AUTO: 79 10E9/L (ref 150–450)
PLATELET # BLD AUTO: 83 10E9/L (ref 150–450)
PLATELET # BLD AUTO: 84 10E9/L (ref 150–450)
PLATELET # BLD AUTO: 84 10E9/L (ref 150–450)
PLATELET # BLD AUTO: 89 10E9/L (ref 150–450)
PLATELET # BLD AUTO: 90 10E9/L (ref 150–450)
PLATELET # BLD AUTO: 91 10E9/L (ref 150–450)
PLATELET # BLD AUTO: 91 10E9/L (ref 150–450)
PLATELET # BLD AUTO: 92 10E9/L (ref 150–450)
PLATELET # BLD AUTO: 92 10E9/L (ref 150–450)
PLATELET # BLD AUTO: 97 10E9/L (ref 150–450)
PLATELET # BLD AUTO: 97 10E9/L (ref 150–450)
PLATELET # BLD AUTO: 98 10E9/L (ref 150–450)
PO2 BLD: 100 MM HG (ref 80–105)
PO2 BLD: 102 MM HG (ref 80–105)
PO2 BLD: 102 MM HG (ref 80–105)
PO2 BLD: 110 MM HG (ref 80–105)
PO2 BLD: 110 MM HG (ref 80–105)
PO2 BLD: 115 MM HG (ref 80–105)
PO2 BLD: 118 MM HG (ref 80–105)
PO2 BLD: 118 MM HG (ref 80–105)
PO2 BLD: 126 MM HG (ref 80–105)
PO2 BLD: 126 MM HG (ref 80–105)
PO2 BLD: 128 MM HG (ref 80–105)
PO2 BLD: 130 MM HG (ref 80–105)
PO2 BLD: 132 MM HG (ref 80–105)
PO2 BLD: 141 MM HG (ref 80–105)
PO2 BLD: 142 MM HG (ref 80–105)
PO2 BLD: 144 MM HG (ref 80–105)
PO2 BLD: 147 MM HG (ref 80–105)
PO2 BLD: 154 MM HG (ref 80–105)
PO2 BLD: 157 MM HG (ref 80–105)
PO2 BLD: 159 MM HG (ref 80–105)
PO2 BLD: 160 MM HG (ref 80–105)
PO2 BLD: 160 MM HG (ref 80–105)
PO2 BLD: 165 MM HG (ref 80–105)
PO2 BLD: 167 MM HG (ref 80–105)
PO2 BLD: 173 MM HG (ref 80–105)
PO2 BLD: 174 MM HG (ref 80–105)
PO2 BLD: 174 MM HG (ref 80–105)
PO2 BLD: 175 MM HG (ref 80–105)
PO2 BLD: 177 MM HG (ref 80–105)
PO2 BLD: 187 MM HG (ref 80–105)
PO2 BLD: 190 MM HG (ref 80–105)
PO2 BLD: 194 MM HG (ref 80–105)
PO2 BLD: 198 MM HG (ref 80–105)
PO2 BLD: 228 MM HG (ref 80–105)
PO2 BLD: 233 MM HG (ref 80–105)
PO2 BLD: 234 MM HG (ref 80–105)
PO2 BLD: 242 MM HG (ref 80–105)
PO2 BLD: 292 MM HG (ref 80–105)
PO2 BLD: 316 MM HG (ref 80–105)
PO2 BLD: 340 MM HG (ref 80–105)
PO2 BLD: 352 MM HG (ref 80–105)
PO2 BLD: 365 MM HG (ref 80–105)
PO2 BLD: 382 MM HG (ref 80–105)
PO2 BLD: 430 MM HG (ref 80–105)
PO2 BLD: 492 MM HG (ref 80–105)
PO2 BLD: 55 MM HG (ref 80–105)
PO2 BLD: 56 MM HG (ref 80–105)
PO2 BLD: 56 MM HG (ref 80–105)
PO2 BLD: 563 MM HG (ref 80–105)
PO2 BLD: 567 MM HG (ref 80–105)
PO2 BLD: 598 MM HG (ref 80–105)
PO2 BLD: 60 MM HG (ref 80–105)
PO2 BLD: 64 MM HG (ref 80–105)
PO2 BLD: 67 MM HG (ref 80–105)
PO2 BLD: 68 MM HG (ref 80–105)
PO2 BLD: 69 MM HG (ref 80–105)
PO2 BLD: 69 MM HG (ref 80–105)
PO2 BLD: 70 MM HG (ref 80–105)
PO2 BLD: 70 MM HG (ref 80–105)
PO2 BLD: 71 MM HG (ref 80–105)
PO2 BLD: 72 MM HG (ref 80–105)
PO2 BLD: 73 MM HG (ref 80–105)
PO2 BLD: 74 MM HG (ref 80–105)
PO2 BLD: 75 MM HG (ref 80–105)
PO2 BLD: 76 MM HG (ref 80–105)
PO2 BLD: 77 MM HG (ref 80–105)
PO2 BLD: 77 MM HG (ref 80–105)
PO2 BLD: 78 MM HG (ref 80–105)
PO2 BLD: 79 MM HG (ref 80–105)
PO2 BLD: 79 MM HG (ref 80–105)
PO2 BLD: 80 MM HG (ref 80–105)
PO2 BLD: 84 MM HG (ref 80–105)
PO2 BLD: 85 MM HG (ref 80–105)
PO2 BLD: 96 MM HG (ref 80–105)
PO2 BLDA: 111 MM HG (ref 80–105)
PO2 BLDA: 129 MM HG (ref 80–105)
PO2 BLDA: 235 MM HG (ref 80–105)
PO2 BLDA: 242 MM HG (ref 80–105)
PO2 BLDA: 245 MM HG (ref 80–105)
PO2 BLDA: 256 MM HG (ref 80–105)
PO2 BLDA: 371 MM HG (ref 80–105)
PO2 BLDA: 383 MM HG (ref 80–105)
PO2 BLDV: 34 MM HG (ref 25–47)
PO2 BLDV: 35 MM HG (ref 25–47)
PO2 BLDV: 35 MM HG (ref 25–47)
PO2 BLDV: 36 MM HG (ref 25–47)
PO2 BLDV: 37 MM HG (ref 25–47)
PO2 BLDV: 39 MM HG (ref 25–47)
PO2 BLDV: 40 MM HG (ref 25–47)
PO2 BLDV: 40 MM HG (ref 25–47)
PO2 BLDV: 41 MM HG (ref 25–47)
PO2 BLDV: 42 MM HG (ref 25–47)
PO2 BLDV: 42 MM HG (ref 25–47)
PO2 BLDV: 46 MM HG (ref 25–47)
PO2 BLDV: 54 MM HG (ref 25–47)
POTASSIUM BLD-SCNC: 2.6 MMOL/L (ref 3.4–5.3)
POTASSIUM BLD-SCNC: 3.1 MMOL/L (ref 3.4–5.3)
POTASSIUM BLD-SCNC: 3.2 MMOL/L (ref 3.4–5.3)
POTASSIUM BLD-SCNC: 3.5 MMOL/L (ref 3.4–5.3)
POTASSIUM BLD-SCNC: 3.5 MMOL/L (ref 3.4–5.3)
POTASSIUM BLD-SCNC: 3.7 MMOL/L (ref 3.4–5.3)
POTASSIUM BLD-SCNC: 3.8 MMOL/L (ref 3.4–5.3)
POTASSIUM BLD-SCNC: 4.1 MMOL/L (ref 3.4–5.3)
POTASSIUM BLD-SCNC: 4.1 MMOL/L (ref 3.4–5.3)
POTASSIUM BLD-SCNC: 4.2 MMOL/L (ref 3.4–5.3)
POTASSIUM BLD-SCNC: 5.2 MMOL/L (ref 3.4–5.3)
POTASSIUM BLD-SCNC: 5.3 MMOL/L (ref 3.4–5.3)
POTASSIUM SERPL-SCNC: 3 MMOL/L (ref 3.4–5.3)
POTASSIUM SERPL-SCNC: 3.4 MMOL/L (ref 3.4–5.3)
POTASSIUM SERPL-SCNC: 3.6 MMOL/L (ref 3.4–5.3)
POTASSIUM SERPL-SCNC: 3.7 MMOL/L (ref 3.4–5.3)
POTASSIUM SERPL-SCNC: 3.8 MMOL/L (ref 3.4–5.3)
POTASSIUM SERPL-SCNC: 3.9 MMOL/L (ref 3.4–5.3)
POTASSIUM SERPL-SCNC: 4 MMOL/L (ref 3.4–5.3)
POTASSIUM SERPL-SCNC: 4.1 MMOL/L (ref 3.4–5.3)
POTASSIUM SERPL-SCNC: 4.2 MMOL/L (ref 3.4–5.3)
POTASSIUM SERPL-SCNC: 4.3 MMOL/L (ref 3.4–5.3)
POTASSIUM SERPL-SCNC: 4.3 MMOL/L (ref 3.4–5.3)
POTASSIUM SERPL-SCNC: 4.4 MMOL/L (ref 3.4–5.3)
POTASSIUM SERPL-SCNC: 4.6 MMOL/L (ref 3.4–5.3)
POTASSIUM SERPL-SCNC: 4.6 MMOL/L (ref 3.4–5.3)
POTASSIUM SERPL-SCNC: 4.8 MMOL/L (ref 3.4–5.3)
POTASSIUM SERPL-SCNC: 4.8 MMOL/L (ref 3.4–5.3)
PROCALCITONIN SERPL-MCNC: 0.79 NG/ML
PROT SERPL-MCNC: 3.4 G/DL (ref 6.8–8.8)
PROT SERPL-MCNC: 3.5 G/DL (ref 6.8–8.8)
PROT SERPL-MCNC: 4.1 G/DL (ref 6.8–8.8)
PROT SERPL-MCNC: 4.9 G/DL (ref 6.8–8.8)
PROT SERPL-MCNC: 4.9 G/DL (ref 6.8–8.8)
PROT SERPL-MCNC: 5.1 G/DL (ref 6.8–8.8)
PROT SERPL-MCNC: 5.1 G/DL (ref 6.8–8.8)
PROT SERPL-MCNC: 5.3 G/DL (ref 6.8–8.8)
PROT SERPL-MCNC: 5.4 G/DL (ref 6.8–8.8)
R TIME UNTIL CLOT FORMS: 5 MIN (ref 4–9)
R TIME UNTIL CLOT FORMS: 5.7 MIN (ref 4–9)
R TIME UNTIL CLOT FORMS: 5.9 MIN (ref 4–9)
R TIME UNTIL CLOT FORMS: 6.2 MIN (ref 4–9)
R TIME UNTIL CLOT FORMS: 6.4 MINUTE (ref 5–10)
R TIME UNTIL CLOT FORMS: >90 MIN (ref 4–9)
RADIOLOGIST FLAGS: ABNORMAL
RADIOLOGIST FLAGS: ABNORMAL
RBC # BLD AUTO: 2.62 10E12/L (ref 4.4–5.9)
RBC # BLD AUTO: 2.64 10E12/L (ref 4.4–5.9)
RBC # BLD AUTO: 2.67 10E12/L (ref 4.4–5.9)
RBC # BLD AUTO: 2.71 10E12/L (ref 4.4–5.9)
RBC # BLD AUTO: 2.75 10E12/L (ref 4.4–5.9)
RBC # BLD AUTO: 2.75 10E12/L (ref 4.4–5.9)
RBC # BLD AUTO: 2.82 10E12/L (ref 4.4–5.9)
RBC # BLD AUTO: 2.86 10E12/L (ref 4.4–5.9)
RBC # BLD AUTO: 2.88 10E12/L (ref 4.4–5.9)
RBC # BLD AUTO: 2.89 10E12/L (ref 4.4–5.9)
RBC # BLD AUTO: 3.01 10E12/L (ref 4.4–5.9)
RBC # BLD AUTO: 3.02 10E12/L (ref 4.4–5.9)
RBC # BLD AUTO: 3.03 10E12/L (ref 4.4–5.9)
RBC # BLD AUTO: 3.11 10E12/L (ref 4.4–5.9)
RBC # BLD AUTO: 3.12 10E12/L (ref 4.4–5.9)
RBC # BLD AUTO: 3.14 10E12/L (ref 4.4–5.9)
RBC # BLD AUTO: 3.15 10E12/L (ref 4.4–5.9)
RBC # BLD AUTO: 3.25 10E12/L (ref 4.4–5.9)
RBC # BLD AUTO: 3.37 10E12/L (ref 4.4–5.9)
RBC # BLD AUTO: 4.43 10E12/L (ref 4.4–5.9)
RBC # BLD AUTO: 4.65 10E12/L (ref 4.4–5.9)
RBC # BLD AUTO: 4.85 10E12/L (ref 4.4–5.9)
RBC # BLD AUTO: 4.91 10E12/L (ref 4.4–5.9)
RBC # BLD AUTO: 4.93 10E12/L (ref 4.4–5.9)
RBC # BLD AUTO: 5.12 10E12/L (ref 4.4–5.9)
RBC # BLD AUTO: 5.26 10E12/L (ref 4.4–5.9)
RBC # BLD AUTO: 5.41 10E12/L (ref 4.4–5.9)
RBC # BLD AUTO: 5.43 10E12/L (ref 4.4–5.9)
RBC # BLD AUTO: 5.79 10E12/L (ref 4.4–5.9)
RBC # BLD AUTO: 6.3 10E12/L (ref 4.4–5.9)
RBC # BLD AUTO: 6.37 10E12/L (ref 4.4–5.9)
RBC # BLD AUTO: 6.46 10E12/L (ref 4.4–5.9)
RBC # BLD AUTO: 6.5 10E12/L (ref 4.4–5.9)
RBC # BLD AUTO: 6.53 10E12/L (ref 4.4–5.9)
RBC #/AREA URNS AUTO: 0 /HPF (ref 0–2)
RBC #/AREA URNS AUTO: 125 /HPF (ref 0–2)
SODIUM BLD-SCNC: 145 MMOL/L (ref 133–144)
SODIUM BLD-SCNC: 147 MMOL/L (ref 133–144)
SODIUM BLD-SCNC: 148 MMOL/L (ref 133–144)
SODIUM BLD-SCNC: 149 MMOL/L (ref 133–144)
SODIUM BLD-SCNC: 150 MMOL/L (ref 133–144)
SODIUM BLD-SCNC: 151 MMOL/L (ref 133–144)
SODIUM BLD-SCNC: 152 MMOL/L (ref 133–144)
SODIUM BLD-SCNC: 153 MMOL/L (ref 133–144)
SODIUM BLD-SCNC: 154 MMOL/L (ref 133–144)
SODIUM BLD-SCNC: 155 MMOL/L (ref 133–144)
SODIUM BLD-SCNC: 156 MMOL/L (ref 133–144)
SODIUM BLD-SCNC: 156 MMOL/L (ref 133–144)
SODIUM SERPL-SCNC: 141 MMOL/L (ref 133–144)
SODIUM SERPL-SCNC: 144 MMOL/L (ref 133–144)
SODIUM SERPL-SCNC: 144 MMOL/L (ref 133–144)
SODIUM SERPL-SCNC: 145 MMOL/L (ref 133–144)
SODIUM SERPL-SCNC: 146 MMOL/L (ref 133–144)
SODIUM SERPL-SCNC: 148 MMOL/L (ref 133–144)
SODIUM SERPL-SCNC: 149 MMOL/L (ref 133–144)
SODIUM SERPL-SCNC: 150 MMOL/L (ref 133–144)
SODIUM SERPL-SCNC: 151 MMOL/L (ref 133–144)
SODIUM SERPL-SCNC: 151 MMOL/L (ref 133–144)
SODIUM SERPL-SCNC: 152 MMOL/L (ref 133–144)
SODIUM SERPL-SCNC: 153 MMOL/L (ref 133–144)
SODIUM SERPL-SCNC: 154 MMOL/L (ref 133–144)
SODIUM SERPL-SCNC: 155 MMOL/L (ref 133–144)
SOURCE: ABNORMAL
SOURCE: ABNORMAL
SP GR UR STRIP: 1.01 (ref 1–1.03)
SP GR UR STRIP: 1.03 (ref 1–1.03)
SPECIMEN EXP DATE BLD: NORMAL
SPECIMEN SOURCE: ABNORMAL
SPECIMEN SOURCE: NORMAL
SQUAMOUS #/AREA URNS AUTO: <1 /HPF (ref 0–1)
TRANS CELLS #/AREA URNS HPF: 1 /HPF (ref 0–1)
TRANSFUSION STATUS PATIENT QL: NORMAL
UROBILINOGEN UR STRIP-MCNC: NORMAL MG/DL (ref 0–2)
UROBILINOGEN UR STRIP-MCNC: NORMAL MG/DL (ref 0–2)
VANCOMYCIN SERPL-MCNC: 20.8 MG/L
VANCOMYCIN SERPL-MCNC: 22.3 MG/L
WBC # BLD AUTO: 11.4 10E9/L (ref 4–11)
WBC # BLD AUTO: 11.5 10E9/L (ref 4–11)
WBC # BLD AUTO: 11.6 10E9/L (ref 4–11)
WBC # BLD AUTO: 11.8 10E9/L (ref 4–11)
WBC # BLD AUTO: 12.5 10E9/L (ref 4–11)
WBC # BLD AUTO: 13.5 10E9/L (ref 4–11)
WBC # BLD AUTO: 13.7 10E9/L (ref 4–11)
WBC # BLD AUTO: 13.7 10E9/L (ref 4–11)
WBC # BLD AUTO: 13.9 10E9/L (ref 4–11)
WBC # BLD AUTO: 14.1 10E9/L (ref 4–11)
WBC # BLD AUTO: 14.6 10E9/L (ref 4–11)
WBC # BLD AUTO: 14.7 10E9/L (ref 4–11)
WBC # BLD AUTO: 15 10E9/L (ref 4–11)
WBC # BLD AUTO: 15.2 10E9/L (ref 4–11)
WBC # BLD AUTO: 16 10E9/L (ref 4–11)
WBC # BLD AUTO: 16.6 10E9/L (ref 4–11)
WBC # BLD AUTO: 17.4 10E9/L (ref 4–11)
WBC # BLD AUTO: 18 10E9/L (ref 4–11)
WBC # BLD AUTO: 19.2 10E9/L (ref 4–11)
WBC # BLD AUTO: 19.3 10E9/L (ref 4–11)
WBC # BLD AUTO: 2.6 10E9/L (ref 4–11)
WBC # BLD AUTO: 2.9 10E9/L (ref 4–11)
WBC # BLD AUTO: 21 10E9/L (ref 4–11)
WBC # BLD AUTO: 21.7 10E9/L (ref 4–11)
WBC # BLD AUTO: 22.3 10E9/L (ref 4–11)
WBC # BLD AUTO: 22.3 10E9/L (ref 4–11)
WBC # BLD AUTO: 23.2 10E9/L (ref 4–11)
WBC # BLD AUTO: 3.3 10E9/L (ref 4–11)
WBC # BLD AUTO: 3.5 10E9/L (ref 4–11)
WBC # BLD AUTO: 3.7 10E9/L (ref 4–11)
WBC # BLD AUTO: 4.2 10E9/L (ref 4–11)
WBC # BLD AUTO: 4.8 10E9/L (ref 4–11)
WBC # BLD AUTO: 7.6 10E9/L (ref 4–11)
WBC # BLD AUTO: 9.3 10E9/L (ref 4–11)
WBC #/AREA URNS AUTO: 0 /HPF (ref 0–5)
WBC #/AREA URNS AUTO: 105 /HPF (ref 0–5)

## 2020-01-01 PROCEDURE — 86850 RBC ANTIBODY SCREEN: CPT | Performed by: THORACIC SURGERY (CARDIOTHORACIC VASCULAR SURGERY)

## 2020-01-01 PROCEDURE — 25000125 ZZHC RX 250: Performed by: PHYSICIAN ASSISTANT

## 2020-01-01 PROCEDURE — 25000128 H RX IP 250 OP 636: Performed by: STUDENT IN AN ORGANIZED HEALTH CARE EDUCATION/TRAINING PROGRAM

## 2020-01-01 PROCEDURE — 87116 MYCOBACTERIA CULTURE: CPT | Performed by: STUDENT IN AN ORGANIZED HEALTH CARE EDUCATION/TRAINING PROGRAM

## 2020-01-01 PROCEDURE — 99207 ECHOCARDIOGRAM LIMITED: CPT | Mod: 26 | Performed by: INTERNAL MEDICINE

## 2020-01-01 PROCEDURE — 33949 ECMO/ECLS DAILY MGMT ARTERY: CPT

## 2020-01-01 PROCEDURE — 80048 BASIC METABOLIC PNL TOTAL CA: CPT | Performed by: SURGERY

## 2020-01-01 PROCEDURE — 82810 BLOOD GASES O2 SAT ONLY: CPT | Performed by: NEUROLOGICAL SURGERY

## 2020-01-01 PROCEDURE — 80048 BASIC METABOLIC PNL TOTAL CA: CPT | Performed by: NEUROLOGICAL SURGERY

## 2020-01-01 PROCEDURE — 40000196 ZZH STATISTIC RAPCV CVP MONITORING

## 2020-01-01 PROCEDURE — 87040 BLOOD CULTURE FOR BACTERIA: CPT | Performed by: PHYSICIAN ASSISTANT

## 2020-01-01 PROCEDURE — 86900 BLOOD TYPING SEROLOGIC ABO: CPT | Performed by: THORACIC SURGERY (CARDIOTHORACIC VASCULAR SURGERY)

## 2020-01-01 PROCEDURE — 82330 ASSAY OF CALCIUM: CPT | Performed by: NEUROLOGICAL SURGERY

## 2020-01-01 PROCEDURE — 25000125 ZZHC RX 250

## 2020-01-01 PROCEDURE — 74340 X-RAY GUIDE FOR GI TUBE: CPT

## 2020-01-01 PROCEDURE — 80069 RENAL FUNCTION PANEL: CPT | Performed by: PHYSICIAN ASSISTANT

## 2020-01-01 PROCEDURE — 40000047 ZZH STATISTIC CTO2 CONT OXYGEN TECH TIME EA 90 MIN

## 2020-01-01 PROCEDURE — 85379 FIBRIN DEGRADATION QUANT: CPT | Performed by: PHYSICIAN ASSISTANT

## 2020-01-01 PROCEDURE — 27211314

## 2020-01-01 PROCEDURE — 85027 COMPLETE CBC AUTOMATED: CPT | Performed by: THORACIC SURGERY (CARDIOTHORACIC VASCULAR SURGERY)

## 2020-01-01 PROCEDURE — 85730 THROMBOPLASTIN TIME PARTIAL: CPT | Performed by: PHYSICIAN ASSISTANT

## 2020-01-01 PROCEDURE — 25000128 H RX IP 250 OP 636: Performed by: NURSE ANESTHETIST, CERTIFIED REGISTERED

## 2020-01-01 PROCEDURE — 80048 BASIC METABOLIC PNL TOTAL CA: CPT | Performed by: ANESTHESIOLOGY

## 2020-01-01 PROCEDURE — 25000128 H RX IP 250 OP 636: Performed by: SURGERY

## 2020-01-01 PROCEDURE — 99291 CRITICAL CARE FIRST HOUR: CPT | Mod: GC | Performed by: ANESTHESIOLOGY

## 2020-01-01 PROCEDURE — 87641 MR-STAPH DNA AMP PROBE: CPT | Performed by: ANESTHESIOLOGY

## 2020-01-01 PROCEDURE — 94003 VENT MGMT INPAT SUBQ DAY: CPT

## 2020-01-01 PROCEDURE — 84145 PROCALCITONIN (PCT): CPT | Performed by: ANESTHESIOLOGY

## 2020-01-01 PROCEDURE — 25000128 H RX IP 250 OP 636: Performed by: PHYSICIAN ASSISTANT

## 2020-01-01 PROCEDURE — 27210437 ZZH NUTRITION PRODUCT SEMIELEM INTERMED LITER

## 2020-01-01 PROCEDURE — 82330 ASSAY OF CALCIUM: CPT | Performed by: THORACIC SURGERY (CARDIOTHORACIC VASCULAR SURGERY)

## 2020-01-01 PROCEDURE — 86850 RBC ANTIBODY SCREEN: CPT | Performed by: NEUROLOGICAL SURGERY

## 2020-01-01 PROCEDURE — 84132 ASSAY OF SERUM POTASSIUM: CPT | Performed by: STUDENT IN AN ORGANIZED HEALTH CARE EDUCATION/TRAINING PROGRAM

## 2020-01-01 PROCEDURE — 40000275 ZZH STATISTIC RCP TIME EA 10 MIN

## 2020-01-01 PROCEDURE — 40000048 ZZH STATISTIC DAILY SWAN MONITORING

## 2020-01-01 PROCEDURE — 40000014 ZZH STATISTIC ARTERIAL MONITORING DAILY

## 2020-01-01 PROCEDURE — 82803 BLOOD GASES ANY COMBINATION: CPT | Performed by: FAMILY MEDICINE

## 2020-01-01 PROCEDURE — 25000128 H RX IP 250 OP 636: Performed by: THORACIC SURGERY (CARDIOTHORACIC VASCULAR SURGERY)

## 2020-01-01 PROCEDURE — 85384 FIBRINOGEN ACTIVITY: CPT | Performed by: STUDENT IN AN ORGANIZED HEALTH CARE EDUCATION/TRAINING PROGRAM

## 2020-01-01 PROCEDURE — 80076 HEPATIC FUNCTION PANEL: CPT | Performed by: STUDENT IN AN ORGANIZED HEALTH CARE EDUCATION/TRAINING PROGRAM

## 2020-01-01 PROCEDURE — 0DH97UZ INSERTION OF FEEDING DEVICE INTO DUODENUM, VIA NATURAL OR ARTIFICIAL OPENING: ICD-10-PCS | Performed by: RADIOLOGY

## 2020-01-01 PROCEDURE — 85384 FIBRINOGEN ACTIVITY: CPT | Performed by: THORACIC SURGERY (CARDIOTHORACIC VASCULAR SURGERY)

## 2020-01-01 PROCEDURE — 36415 COLL VENOUS BLD VENIPUNCTURE: CPT | Performed by: PHYSICIAN ASSISTANT

## 2020-01-01 PROCEDURE — 82805 BLOOD GASES W/O2 SATURATION: CPT | Performed by: NEUROLOGICAL SURGERY

## 2020-01-01 PROCEDURE — 85610 PROTHROMBIN TIME: CPT | Performed by: NEUROLOGICAL SURGERY

## 2020-01-01 PROCEDURE — 85027 COMPLETE CBC AUTOMATED: CPT | Performed by: SURGERY

## 2020-01-01 PROCEDURE — P9059 PLASMA, FRZ BETWEEN 8-24HOUR: HCPCS | Performed by: THORACIC SURGERY (CARDIOTHORACIC VASCULAR SURGERY)

## 2020-01-01 PROCEDURE — 85730 THROMBOPLASTIN TIME PARTIAL: CPT | Performed by: ANESTHESIOLOGY

## 2020-01-01 PROCEDURE — P9037 PLATE PHERES LEUKOREDU IRRAD: HCPCS | Performed by: THORACIC SURGERY (CARDIOTHORACIC VASCULAR SURGERY)

## 2020-01-01 PROCEDURE — 85520 HEPARIN ASSAY: CPT | Performed by: STUDENT IN AN ORGANIZED HEALTH CARE EDUCATION/TRAINING PROGRAM

## 2020-01-01 PROCEDURE — 80053 COMPREHEN METABOLIC PANEL: CPT | Performed by: ANESTHESIOLOGY

## 2020-01-01 PROCEDURE — 85610 PROTHROMBIN TIME: CPT | Performed by: ANESTHESIOLOGY

## 2020-01-01 PROCEDURE — 82803 BLOOD GASES ANY COMBINATION: CPT | Performed by: STUDENT IN AN ORGANIZED HEALTH CARE EDUCATION/TRAINING PROGRAM

## 2020-01-01 PROCEDURE — 00000146 ZZHCL STATISTIC GLUCOSE BY METER IP

## 2020-01-01 PROCEDURE — 25800030 ZZH RX IP 258 OP 636: Performed by: THORACIC SURGERY (CARDIOTHORACIC VASCULAR SURGERY)

## 2020-01-01 PROCEDURE — 85730 THROMBOPLASTIN TIME PARTIAL: CPT | Performed by: SURGERY

## 2020-01-01 PROCEDURE — 85396 CLOTTING ASSAY WHOLE BLOOD: CPT | Performed by: STUDENT IN AN ORGANIZED HEALTH CARE EDUCATION/TRAINING PROGRAM

## 2020-01-01 PROCEDURE — 25000132 ZZH RX MED GY IP 250 OP 250 PS 637: Performed by: STUDENT IN AN ORGANIZED HEALTH CARE EDUCATION/TRAINING PROGRAM

## 2020-01-01 PROCEDURE — 25000125 ZZHC RX 250: Performed by: NURSE ANESTHETIST, CERTIFIED REGISTERED

## 2020-01-01 PROCEDURE — 20000004 ZZH R&B ICU UMMC

## 2020-01-01 PROCEDURE — 85027 COMPLETE CBC AUTOMATED: CPT | Performed by: ANESTHESIOLOGY

## 2020-01-01 PROCEDURE — C9113 INJ PANTOPRAZOLE SODIUM, VIA: HCPCS | Performed by: STUDENT IN AN ORGANIZED HEALTH CARE EDUCATION/TRAINING PROGRAM

## 2020-01-01 PROCEDURE — 27211336 ZZ H CANNULA, ARTERIAL CENTRAL

## 2020-01-01 PROCEDURE — 25000125 ZZHC RX 250: Performed by: STUDENT IN AN ORGANIZED HEALTH CARE EDUCATION/TRAINING PROGRAM

## 2020-01-01 PROCEDURE — 85730 THROMBOPLASTIN TIME PARTIAL: CPT | Performed by: STUDENT IN AN ORGANIZED HEALTH CARE EDUCATION/TRAINING PROGRAM

## 2020-01-01 PROCEDURE — 83605 ASSAY OF LACTIC ACID: CPT | Performed by: NEUROLOGICAL SURGERY

## 2020-01-01 PROCEDURE — 36000074 ZZH SURGERY LEVEL 6 1ST 30 MIN - UMMC: Performed by: THORACIC SURGERY (CARDIOTHORACIC VASCULAR SURGERY)

## 2020-01-01 PROCEDURE — C1768 GRAFT, VASCULAR: HCPCS | Performed by: THORACIC SURGERY (CARDIOTHORACIC VASCULAR SURGERY)

## 2020-01-01 PROCEDURE — 84100 ASSAY OF PHOSPHORUS: CPT | Performed by: PHYSICIAN ASSISTANT

## 2020-01-01 PROCEDURE — 27211333 ZZ H CANNULA, VENOUS CENTRAL

## 2020-01-01 PROCEDURE — 25000128 H RX IP 250 OP 636: Performed by: ANESTHESIOLOGY

## 2020-01-01 PROCEDURE — 27210432 ZZH NUTRITION PRODUCT RENAL BASIC LITER: Performed by: DIETITIAN, REGISTERED

## 2020-01-01 PROCEDURE — 83735 ASSAY OF MAGNESIUM: CPT | Performed by: ANESTHESIOLOGY

## 2020-01-01 PROCEDURE — 99291 CRITICAL CARE FIRST HOUR: CPT | Mod: 25 | Performed by: FAMILY MEDICINE

## 2020-01-01 PROCEDURE — 27211184 ZZH CARDIOHELP CIRCUIT

## 2020-01-01 PROCEDURE — 85049 AUTOMATED PLATELET COUNT: CPT | Performed by: THORACIC SURGERY (CARDIOTHORACIC VASCULAR SURGERY)

## 2020-01-01 PROCEDURE — 93010 ELECTROCARDIOGRAM REPORT: CPT | Mod: 59 | Performed by: INTERNAL MEDICINE

## 2020-01-01 PROCEDURE — 85396 CLOTTING ASSAY WHOLE BLOOD: CPT | Performed by: ANESTHESIOLOGY

## 2020-01-01 PROCEDURE — 41000018 ZZH PER-PERFUSION 1ST 30 MIN: Performed by: THORACIC SURGERY (CARDIOTHORACIC VASCULAR SURGERY)

## 2020-01-01 PROCEDURE — 85610 PROTHROMBIN TIME: CPT | Performed by: PHYSICIAN ASSISTANT

## 2020-01-01 PROCEDURE — 85730 THROMBOPLASTIN TIME PARTIAL: CPT | Performed by: FAMILY MEDICINE

## 2020-01-01 PROCEDURE — 27810169 ZZH OR IMPLANT GENERAL: Performed by: THORACIC SURGERY (CARDIOTHORACIC VASCULAR SURGERY)

## 2020-01-01 PROCEDURE — 84295 ASSAY OF SERUM SODIUM: CPT | Performed by: ANESTHESIOLOGY

## 2020-01-01 PROCEDURE — 85730 THROMBOPLASTIN TIME PARTIAL: CPT | Performed by: NEUROLOGICAL SURGERY

## 2020-01-01 PROCEDURE — 25800025 ZZH RX 258: Performed by: STUDENT IN AN ORGANIZED HEALTH CARE EDUCATION/TRAINING PROGRAM

## 2020-01-01 PROCEDURE — 85520 HEPARIN ASSAY: CPT | Performed by: ANESTHESIOLOGY

## 2020-01-01 PROCEDURE — 84100 ASSAY OF PHOSPHORUS: CPT | Performed by: SURGERY

## 2020-01-01 PROCEDURE — 99292 CRITICAL CARE ADDL 30 MIN: CPT | Performed by: FAMILY MEDICINE

## 2020-01-01 PROCEDURE — 85379 FIBRIN DEGRADATION QUANT: CPT | Performed by: NEUROLOGICAL SURGERY

## 2020-01-01 PROCEDURE — 37000009 ZZH ANESTHESIA TECHNICAL FEE, EACH ADDTL 15 MIN: Performed by: THORACIC SURGERY (CARDIOTHORACIC VASCULAR SURGERY)

## 2020-01-01 PROCEDURE — 83051 HEMOGLOBIN PLASMA: CPT | Performed by: PHYSICIAN ASSISTANT

## 2020-01-01 PROCEDURE — 80048 BASIC METABOLIC PNL TOTAL CA: CPT | Performed by: STUDENT IN AN ORGANIZED HEALTH CARE EDUCATION/TRAINING PROGRAM

## 2020-01-01 PROCEDURE — 93005 ELECTROCARDIOGRAM TRACING: CPT | Performed by: FAMILY MEDICINE

## 2020-01-01 PROCEDURE — 40000276 ZZH STATISTIC RCP TIME ED VENT EA 10 MIN

## 2020-01-01 PROCEDURE — 82330 ASSAY OF CALCIUM: CPT | Performed by: PHYSICIAN ASSISTANT

## 2020-01-01 PROCEDURE — 82805 BLOOD GASES W/O2 SATURATION: CPT | Performed by: PHYSICIAN ASSISTANT

## 2020-01-01 PROCEDURE — 85384 FIBRINOGEN ACTIVITY: CPT | Performed by: PHYSICIAN ASSISTANT

## 2020-01-01 PROCEDURE — 5A1955Z RESPIRATORY VENTILATION, GREATER THAN 96 CONSECUTIVE HOURS: ICD-10-PCS | Performed by: THORACIC SURGERY (CARDIOTHORACIC VASCULAR SURGERY)

## 2020-01-01 PROCEDURE — 87206 SMEAR FLUORESCENT/ACID STAI: CPT | Performed by: STUDENT IN AN ORGANIZED HEALTH CARE EDUCATION/TRAINING PROGRAM

## 2020-01-01 PROCEDURE — 84295 ASSAY OF SERUM SODIUM: CPT | Performed by: NEUROLOGICAL SURGERY

## 2020-01-01 PROCEDURE — 87070 CULTURE OTHR SPECIMN AEROBIC: CPT | Performed by: STUDENT IN AN ORGANIZED HEALTH CARE EDUCATION/TRAINING PROGRAM

## 2020-01-01 PROCEDURE — 84132 ASSAY OF SERUM POTASSIUM: CPT

## 2020-01-01 PROCEDURE — 25000555 ZZHC RX FACTOR IP 250 OP 636: Performed by: NEUROLOGICAL SURGERY

## 2020-01-01 PROCEDURE — 87077 CULTURE AEROBIC IDENTIFY: CPT | Performed by: ANESTHESIOLOGY

## 2020-01-01 PROCEDURE — 82805 BLOOD GASES W/O2 SATURATION: CPT | Performed by: STUDENT IN AN ORGANIZED HEALTH CARE EDUCATION/TRAINING PROGRAM

## 2020-01-01 PROCEDURE — 87070 CULTURE OTHR SPECIMN AEROBIC: CPT | Performed by: ANESTHESIOLOGY

## 2020-01-01 PROCEDURE — 83605 ASSAY OF LACTIC ACID: CPT | Performed by: PHYSICIAN ASSISTANT

## 2020-01-01 PROCEDURE — 85347 COAGULATION TIME ACTIVATED: CPT

## 2020-01-01 PROCEDURE — 96366 THER/PROPH/DIAG IV INF ADDON: CPT | Performed by: FAMILY MEDICINE

## 2020-01-01 PROCEDURE — 82805 BLOOD GASES W/O2 SATURATION: CPT | Performed by: THORACIC SURGERY (CARDIOTHORACIC VASCULAR SURGERY)

## 2020-01-01 PROCEDURE — 25000565 ZZH ISOFLURANE, EA 15 MIN: Performed by: THORACIC SURGERY (CARDIOTHORACIC VASCULAR SURGERY)

## 2020-01-01 PROCEDURE — 41000019 ZZH PERA-PERFUSION EACH ADDTL 15 MIN: Performed by: THORACIC SURGERY (CARDIOTHORACIC VASCULAR SURGERY)

## 2020-01-01 PROCEDURE — 0BJ08ZZ INSPECTION OF TRACHEOBRONCHIAL TREE, VIA NATURAL OR ARTIFICIAL OPENING ENDOSCOPIC: ICD-10-PCS | Performed by: ANESTHESIOLOGY

## 2020-01-01 PROCEDURE — 85520 HEPARIN ASSAY: CPT | Performed by: PHYSICIAN ASSISTANT

## 2020-01-01 PROCEDURE — 85300 ANTITHROMBIN III ACTIVITY: CPT | Performed by: PHYSICIAN ASSISTANT

## 2020-01-01 PROCEDURE — 93010 ELECTROCARDIOGRAM REPORT: CPT | Performed by: INTERNAL MEDICINE

## 2020-01-01 PROCEDURE — 40000281 ZZH STATISTIC TRANSPORT TIME EA 15 MIN

## 2020-01-01 PROCEDURE — 82805 BLOOD GASES W/O2 SATURATION: CPT | Performed by: ANESTHESIOLOGY

## 2020-01-01 PROCEDURE — 71045 X-RAY EXAM CHEST 1 VIEW: CPT

## 2020-01-01 PROCEDURE — 85384 FIBRINOGEN ACTIVITY: CPT | Performed by: NEUROLOGICAL SURGERY

## 2020-01-01 PROCEDURE — 86901 BLOOD TYPING SEROLOGIC RH(D): CPT | Performed by: THORACIC SURGERY (CARDIOTHORACIC VASCULAR SURGERY)

## 2020-01-01 PROCEDURE — 83605 ASSAY OF LACTIC ACID: CPT

## 2020-01-01 PROCEDURE — 25000128 H RX IP 250 OP 636: Performed by: FAMILY MEDICINE

## 2020-01-01 PROCEDURE — 88304 TISSUE EXAM BY PATHOLOGIST: CPT | Performed by: THORACIC SURGERY (CARDIOTHORACIC VASCULAR SURGERY)

## 2020-01-01 PROCEDURE — 27211402 ZZH SENSOR NIRS OXIMETER, ADULT

## 2020-01-01 PROCEDURE — 27211020 ZZHC OR CELL SAVER OPNP: Performed by: THORACIC SURGERY (CARDIOTHORACIC VASCULAR SURGERY)

## 2020-01-01 PROCEDURE — 85610 PROTHROMBIN TIME: CPT | Performed by: THORACIC SURGERY (CARDIOTHORACIC VASCULAR SURGERY)

## 2020-01-01 PROCEDURE — 25800030 ZZH RX IP 258 OP 636: Performed by: NURSE ANESTHETIST, CERTIFIED REGISTERED

## 2020-01-01 PROCEDURE — 86923 COMPATIBILITY TEST ELECTRIC: CPT | Performed by: THORACIC SURGERY (CARDIOTHORACIC VASCULAR SURGERY)

## 2020-01-01 PROCEDURE — 93306 TTE W/DOPPLER COMPLETE: CPT | Mod: 26 | Performed by: INTERNAL MEDICINE

## 2020-01-01 PROCEDURE — 85027 COMPLETE CBC AUTOMATED: CPT | Performed by: PHYSICIAN ASSISTANT

## 2020-01-01 PROCEDURE — 84100 ASSAY OF PHOSPHORUS: CPT | Performed by: THORACIC SURGERY (CARDIOTHORACIC VASCULAR SURGERY)

## 2020-01-01 PROCEDURE — P9037 PLATE PHERES LEUKOREDU IRRAD: HCPCS | Performed by: PHYSICIAN ASSISTANT

## 2020-01-01 PROCEDURE — 96368 THER/DIAG CONCURRENT INF: CPT | Performed by: FAMILY MEDICINE

## 2020-01-01 PROCEDURE — 85730 THROMBOPLASTIN TIME PARTIAL: CPT | Performed by: THORACIC SURGERY (CARDIOTHORACIC VASCULAR SURGERY)

## 2020-01-01 PROCEDURE — P9041 ALBUMIN (HUMAN),5%, 50ML: HCPCS | Performed by: STUDENT IN AN ORGANIZED HEALTH CARE EDUCATION/TRAINING PROGRAM

## 2020-01-01 PROCEDURE — 87185 SC STD ENZYME DETCJ PER NZM: CPT | Performed by: ANESTHESIOLOGY

## 2020-01-01 PROCEDURE — 85610 PROTHROMBIN TIME: CPT | Performed by: STUDENT IN AN ORGANIZED HEALTH CARE EDUCATION/TRAINING PROGRAM

## 2020-01-01 PROCEDURE — 84100 ASSAY OF PHOSPHORUS: CPT | Performed by: STUDENT IN AN ORGANIZED HEALTH CARE EDUCATION/TRAINING PROGRAM

## 2020-01-01 PROCEDURE — 25800030 ZZH RX IP 258 OP 636: Performed by: STUDENT IN AN ORGANIZED HEALTH CARE EDUCATION/TRAINING PROGRAM

## 2020-01-01 PROCEDURE — 94640 AIRWAY INHALATION TREATMENT: CPT

## 2020-01-01 PROCEDURE — 94640 AIRWAY INHALATION TREATMENT: CPT | Mod: 76

## 2020-01-01 PROCEDURE — 25500064 ZZH RX 255 OP 636: Performed by: INTERNAL MEDICINE

## 2020-01-01 PROCEDURE — 27210794 ZZH OR GENERAL SUPPLY STERILE: Performed by: THORACIC SURGERY (CARDIOTHORACIC VASCULAR SURGERY)

## 2020-01-01 PROCEDURE — 25800030 ZZH RX IP 258 OP 636: Performed by: PHYSICIAN ASSISTANT

## 2020-01-01 PROCEDURE — 83605 ASSAY OF LACTIC ACID: CPT | Performed by: THORACIC SURGERY (CARDIOTHORACIC VASCULAR SURGERY)

## 2020-01-01 PROCEDURE — 83735 ASSAY OF MAGNESIUM: CPT | Performed by: STUDENT IN AN ORGANIZED HEALTH CARE EDUCATION/TRAINING PROGRAM

## 2020-01-01 PROCEDURE — 02UX08Z SUPPLEMENT THORACIC AORTA, ASCENDING/ARCH WITH ZOOPLASTIC TISSUE, OPEN APPROACH: ICD-10-PCS | Performed by: THORACIC SURGERY (CARDIOTHORACIC VASCULAR SURGERY)

## 2020-01-01 PROCEDURE — P9016 RBC LEUKOCYTES REDUCED: HCPCS | Performed by: THORACIC SURGERY (CARDIOTHORACIC VASCULAR SURGERY)

## 2020-01-01 PROCEDURE — 83735 ASSAY OF MAGNESIUM: CPT | Performed by: PHYSICIAN ASSISTANT

## 2020-01-01 PROCEDURE — 96367 TX/PROPH/DG ADDL SEQ IV INF: CPT | Performed by: FAMILY MEDICINE

## 2020-01-01 PROCEDURE — 85379 FIBRIN DEGRADATION QUANT: CPT | Performed by: STUDENT IN AN ORGANIZED HEALTH CARE EDUCATION/TRAINING PROGRAM

## 2020-01-01 PROCEDURE — 83735 ASSAY OF MAGNESIUM: CPT | Performed by: SURGERY

## 2020-01-01 PROCEDURE — 82803 BLOOD GASES ANY COMBINATION: CPT

## 2020-01-01 PROCEDURE — 25000128 H RX IP 250 OP 636

## 2020-01-01 PROCEDURE — 25000125 ZZHC RX 250: Performed by: THORACIC SURGERY (CARDIOTHORACIC VASCULAR SURGERY)

## 2020-01-01 PROCEDURE — 82330 ASSAY OF CALCIUM: CPT | Performed by: ANESTHESIOLOGY

## 2020-01-01 PROCEDURE — 80202 ASSAY OF VANCOMYCIN: CPT | Performed by: THORACIC SURGERY (CARDIOTHORACIC VASCULAR SURGERY)

## 2020-01-01 PROCEDURE — 84100 ASSAY OF PHOSPHORUS: CPT | Performed by: ANESTHESIOLOGY

## 2020-01-01 PROCEDURE — 85520 HEPARIN ASSAY: CPT | Performed by: THORACIC SURGERY (CARDIOTHORACIC VASCULAR SURGERY)

## 2020-01-01 PROCEDURE — 25000132 ZZH RX MED GY IP 250 OP 250 PS 637: Performed by: THORACIC SURGERY (CARDIOTHORACIC VASCULAR SURGERY)

## 2020-01-01 PROCEDURE — G0463 HOSPITAL OUTPT CLINIC VISIT: HCPCS

## 2020-01-01 PROCEDURE — 93312 ECHO TRANSESOPHAGEAL: CPT | Mod: 26 | Performed by: INTERNAL MEDICINE

## 2020-01-01 PROCEDURE — 94645 CONT INHLJ TX EACH ADDL HOUR: CPT

## 2020-01-01 PROCEDURE — 25000125 ZZHC RX 250: Performed by: ANESTHESIOLOGY

## 2020-01-01 PROCEDURE — P9041 ALBUMIN (HUMAN),5%, 50ML: HCPCS | Performed by: PHYSICIAN ASSISTANT

## 2020-01-01 PROCEDURE — 82947 ASSAY GLUCOSE BLOOD QUANT: CPT | Performed by: ANESTHESIOLOGY

## 2020-01-01 PROCEDURE — 84100 ASSAY OF PHOSPHORUS: CPT | Performed by: FAMILY MEDICINE

## 2020-01-01 PROCEDURE — 87205 SMEAR GRAM STAIN: CPT | Performed by: STUDENT IN AN ORGANIZED HEALTH CARE EDUCATION/TRAINING PROGRAM

## 2020-01-01 PROCEDURE — 27210300 ZZH CANNULA HIGH FLOW, ADULT

## 2020-01-01 PROCEDURE — 80048 BASIC METABOLIC PNL TOTAL CA: CPT | Performed by: PHYSICIAN ASSISTANT

## 2020-01-01 PROCEDURE — 30243V1 TRANSFUSION OF NONAUTOLOGOUS ANTIHEMOPHILIC FACTORS INTO CENTRAL VEIN, PERCUTANEOUS APPROACH: ICD-10-PCS | Performed by: THORACIC SURGERY (CARDIOTHORACIC VASCULAR SURGERY)

## 2020-01-01 PROCEDURE — P9041 ALBUMIN (HUMAN),5%, 50ML: HCPCS

## 2020-01-01 PROCEDURE — 25000125 ZZHC RX 250: Performed by: FAMILY MEDICINE

## 2020-01-01 PROCEDURE — 36000059 ZZH SURGERY LEVEL 3 EA 15 ADDTL MIN UMMC: Performed by: THORACIC SURGERY (CARDIOTHORACIC VASCULAR SURGERY)

## 2020-01-01 PROCEDURE — 82947 ASSAY GLUCOSE BLOOD QUANT: CPT

## 2020-01-01 PROCEDURE — 85027 COMPLETE CBC AUTOMATED: CPT | Performed by: NEUROLOGICAL SURGERY

## 2020-01-01 PROCEDURE — 87015 SPECIMEN INFECT AGNT CONCNTJ: CPT | Performed by: STUDENT IN AN ORGANIZED HEALTH CARE EDUCATION/TRAINING PROGRAM

## 2020-01-01 PROCEDURE — 85396 CLOTTING ASSAY WHOLE BLOOD: CPT | Performed by: THORACIC SURGERY (CARDIOTHORACIC VASCULAR SURGERY)

## 2020-01-01 PROCEDURE — 83605 ASSAY OF LACTIC ACID: CPT | Performed by: ANESTHESIOLOGY

## 2020-01-01 PROCEDURE — C1779 LEAD, PMKR, TRANSVENOUS VDD: HCPCS | Performed by: THORACIC SURGERY (CARDIOTHORACIC VASCULAR SURGERY)

## 2020-01-01 PROCEDURE — 80202 ASSAY OF VANCOMYCIN: CPT | Performed by: PHYSICIAN ASSISTANT

## 2020-01-01 PROCEDURE — 94644 CONT INHLJ TX 1ST HOUR: CPT

## 2020-01-01 PROCEDURE — 27210432 ZZH NUTRITION PRODUCT RENAL BASIC LITER

## 2020-01-01 PROCEDURE — P9016 RBC LEUKOCYTES REDUCED: HCPCS | Performed by: NEUROLOGICAL SURGERY

## 2020-01-01 PROCEDURE — 5A1522G EXTRACORPOREAL OXYGENATION, MEMBRANE, PERIPHERAL VENO-ARTERIAL: ICD-10-PCS | Performed by: THORACIC SURGERY (CARDIOTHORACIC VASCULAR SURGERY)

## 2020-01-01 PROCEDURE — 25000128 H RX IP 250 OP 636: Performed by: NEUROLOGICAL SURGERY

## 2020-01-01 PROCEDURE — 70450 CT HEAD/BRAIN W/O DYE: CPT

## 2020-01-01 PROCEDURE — 93320 DOPPLER ECHO COMPLETE: CPT | Mod: 26 | Performed by: INTERNAL MEDICINE

## 2020-01-01 PROCEDURE — 71045 X-RAY EXAM CHEST 1 VIEW: CPT | Mod: 77

## 2020-01-01 PROCEDURE — C9113 INJ PANTOPRAZOLE SODIUM, VIA: HCPCS | Performed by: PHYSICIAN ASSISTANT

## 2020-01-01 PROCEDURE — 37000008 ZZH ANESTHESIA TECHNICAL FEE, 1ST 30 MIN: Performed by: THORACIC SURGERY (CARDIOTHORACIC VASCULAR SURGERY)

## 2020-01-01 PROCEDURE — 40000986 XR CHEST PORT 1 VW

## 2020-01-01 PROCEDURE — 99291 CRITICAL CARE FIRST HOUR: CPT | Mod: 25 | Performed by: ANESTHESIOLOGY

## 2020-01-01 PROCEDURE — 87040 BLOOD CULTURE FOR BACTERIA: CPT | Performed by: ANESTHESIOLOGY

## 2020-01-01 PROCEDURE — 87205 SMEAR GRAM STAIN: CPT | Performed by: ANESTHESIOLOGY

## 2020-01-01 PROCEDURE — 93503 INSERT/PLACE HEART CATHETER: CPT

## 2020-01-01 PROCEDURE — 36415 COLL VENOUS BLD VENIPUNCTURE: CPT | Performed by: ANESTHESIOLOGY

## 2020-01-01 PROCEDURE — 25000564 ZZH DESFLURANE, EA 15 MIN: Performed by: THORACIC SURGERY (CARDIOTHORACIC VASCULAR SURGERY)

## 2020-01-01 PROCEDURE — 40000671 ZZH STATISTIC ANESTHESIA CASE

## 2020-01-01 PROCEDURE — 31622 DX BRONCHOSCOPE/WASH: CPT

## 2020-01-01 PROCEDURE — 25000132 ZZH RX MED GY IP 250 OP 250 PS 637: Performed by: PHYSICIAN ASSISTANT

## 2020-01-01 PROCEDURE — 25800030 ZZH RX IP 258 OP 636: Performed by: ANESTHESIOLOGY

## 2020-01-01 PROCEDURE — 84132 ASSAY OF SERUM POTASSIUM: CPT | Performed by: NEUROLOGICAL SURGERY

## 2020-01-01 PROCEDURE — 93010 ELECTROCARDIOGRAM REPORT: CPT | Mod: Z6 | Performed by: FAMILY MEDICINE

## 2020-01-01 PROCEDURE — 87640 STAPH A DNA AMP PROBE: CPT | Performed by: ANESTHESIOLOGY

## 2020-01-01 PROCEDURE — 84100 ASSAY OF PHOSPHORUS: CPT | Performed by: NEUROLOGICAL SURGERY

## 2020-01-01 PROCEDURE — 40000986 XR ABDOMEN PORT 1 VW

## 2020-01-01 PROCEDURE — C1751 CATH, INF, PER/CENT/MIDLINE: HCPCS

## 2020-01-01 PROCEDURE — 85520 HEPARIN ASSAY: CPT | Performed by: NEUROLOGICAL SURGERY

## 2020-01-01 PROCEDURE — 93325 DOPPLER ECHO COLOR FLOW MAPG: CPT

## 2020-01-01 PROCEDURE — 85300 ANTITHROMBIN III ACTIVITY: CPT | Performed by: STUDENT IN AN ORGANIZED HEALTH CARE EDUCATION/TRAINING PROGRAM

## 2020-01-01 PROCEDURE — 87186 SC STD MICRODIL/AGAR DIL: CPT | Performed by: ANESTHESIOLOGY

## 2020-01-01 PROCEDURE — 87210 SMEAR WET MOUNT SALINE/INK: CPT | Performed by: STUDENT IN AN ORGANIZED HEALTH CARE EDUCATION/TRAINING PROGRAM

## 2020-01-01 PROCEDURE — 36000057 ZZH SURGERY LEVEL 3 1ST 30 MIN - UMMC: Performed by: THORACIC SURGERY (CARDIOTHORACIC VASCULAR SURGERY)

## 2020-01-01 PROCEDURE — 86850 RBC ANTIBODY SCREEN: CPT | Performed by: FAMILY MEDICINE

## 2020-01-01 PROCEDURE — 82803 BLOOD GASES ANY COMBINATION: CPT | Performed by: THORACIC SURGERY (CARDIOTHORACIC VASCULAR SURGERY)

## 2020-01-01 PROCEDURE — 86140 C-REACTIVE PROTEIN: CPT | Performed by: ANESTHESIOLOGY

## 2020-01-01 PROCEDURE — 96375 TX/PRO/DX INJ NEW DRUG ADDON: CPT | Performed by: FAMILY MEDICINE

## 2020-01-01 PROCEDURE — 83735 ASSAY OF MAGNESIUM: CPT | Performed by: NEUROLOGICAL SURGERY

## 2020-01-01 PROCEDURE — 36556 INSERT NON-TUNNEL CV CATH: CPT | Mod: GC | Performed by: ANESTHESIOLOGY

## 2020-01-01 PROCEDURE — 80053 COMPREHEN METABOLIC PANEL: CPT | Performed by: NEUROLOGICAL SURGERY

## 2020-01-01 PROCEDURE — 83735 ASSAY OF MAGNESIUM: CPT | Performed by: FAMILY MEDICINE

## 2020-01-01 PROCEDURE — 25800030 ZZH RX IP 258 OP 636: Performed by: SURGERY

## 2020-01-01 PROCEDURE — 87102 FUNGUS ISOLATION CULTURE: CPT | Performed by: STUDENT IN AN ORGANIZED HEALTH CARE EDUCATION/TRAINING PROGRAM

## 2020-01-01 PROCEDURE — 86901 BLOOD TYPING SEROLOGIC RH(D): CPT | Performed by: NEUROLOGICAL SURGERY

## 2020-01-01 PROCEDURE — 85610 PROTHROMBIN TIME: CPT | Performed by: FAMILY MEDICINE

## 2020-01-01 PROCEDURE — 27210447 ZZH PACK CELL SAVER CSP: Performed by: THORACIC SURGERY (CARDIOTHORACIC VASCULAR SURGERY)

## 2020-01-01 PROCEDURE — P9073 PLATELETS PHERESIS PATH REDU: HCPCS | Performed by: PHYSICIAN ASSISTANT

## 2020-01-01 PROCEDURE — C1758 CATHETER, URETERAL: HCPCS | Performed by: THORACIC SURGERY (CARDIOTHORACIC VASCULAR SURGERY)

## 2020-01-01 PROCEDURE — 36000076 ZZH SURGERY LEVEL 6 EA 15 ADDTL MIN - UMMC: Performed by: THORACIC SURGERY (CARDIOTHORACIC VASCULAR SURGERY)

## 2020-01-01 PROCEDURE — 40000739 ZZH STATISTIC STROKE CODE W/O ACCESS

## 2020-01-01 PROCEDURE — 86923 COMPATIBILITY TEST ELECTRIC: CPT | Performed by: NEUROLOGICAL SURGERY

## 2020-01-01 PROCEDURE — 85027 COMPLETE CBC AUTOMATED: CPT | Performed by: STUDENT IN AN ORGANIZED HEALTH CARE EDUCATION/TRAINING PROGRAM

## 2020-01-01 PROCEDURE — 76000 FLUOROSCOPY <1 HR PHYS/QHP: CPT | Mod: TC

## 2020-01-01 PROCEDURE — 40000809 ZZH STATISTIC NO DOCUMENTATION TO SUPPORT CHARGE

## 2020-01-01 PROCEDURE — C1763 CONN TISS, NON-HUMAN: HCPCS | Performed by: THORACIC SURGERY (CARDIOTHORACIC VASCULAR SURGERY)

## 2020-01-01 PROCEDURE — P9012 CRYOPRECIPITATE EACH UNIT: HCPCS | Performed by: THORACIC SURGERY (CARDIOTHORACIC VASCULAR SURGERY)

## 2020-01-01 PROCEDURE — 85384 FIBRINOGEN ACTIVITY: CPT | Performed by: SURGERY

## 2020-01-01 PROCEDURE — 82330 ASSAY OF CALCIUM: CPT

## 2020-01-01 PROCEDURE — 94002 VENT MGMT INPAT INIT DAY: CPT

## 2020-01-01 PROCEDURE — 5A1221Z PERFORMANCE OF CARDIAC OUTPUT, CONTINUOUS: ICD-10-PCS | Performed by: THORACIC SURGERY (CARDIOTHORACIC VASCULAR SURGERY)

## 2020-01-01 PROCEDURE — 82803 BLOOD GASES ANY COMBINATION: CPT | Performed by: ANESTHESIOLOGY

## 2020-01-01 PROCEDURE — 83605 ASSAY OF LACTIC ACID: CPT | Performed by: FAMILY MEDICINE

## 2020-01-01 PROCEDURE — 40000344 ZZHCL STATISTIC THAWING COMPONENT: Performed by: THORACIC SURGERY (CARDIOTHORACIC VASCULAR SURGERY)

## 2020-01-01 PROCEDURE — 80053 COMPREHEN METABOLIC PANEL: CPT | Performed by: SURGERY

## 2020-01-01 PROCEDURE — 85384 FIBRINOGEN ACTIVITY: CPT | Performed by: ANESTHESIOLOGY

## 2020-01-01 PROCEDURE — 86900 BLOOD TYPING SEROLOGIC ABO: CPT | Performed by: FAMILY MEDICINE

## 2020-01-01 PROCEDURE — 83036 HEMOGLOBIN GLYCOSYLATED A1C: CPT | Performed by: NEUROLOGICAL SURGERY

## 2020-01-01 PROCEDURE — 33947 ECMO/ECLS INITIATION ARTERY: CPT

## 2020-01-01 PROCEDURE — 86901 BLOOD TYPING SEROLOGIC RH(D): CPT | Performed by: FAMILY MEDICINE

## 2020-01-01 PROCEDURE — 3E043XZ INTRODUCTION OF VASOPRESSOR INTO CENTRAL VEIN, PERCUTANEOUS APPROACH: ICD-10-PCS | Performed by: THORACIC SURGERY (CARDIOTHORACIC VASCULAR SURGERY)

## 2020-01-01 PROCEDURE — 40000264 ECHOCARDIOGRAM COMPLETE

## 2020-01-01 PROCEDURE — 93005 ELECTROCARDIOGRAM TRACING: CPT

## 2020-01-01 PROCEDURE — 84460 ALANINE AMINO (ALT) (SGPT): CPT | Performed by: PHYSICIAN ASSISTANT

## 2020-01-01 PROCEDURE — 87086 URINE CULTURE/COLONY COUNT: CPT | Performed by: THORACIC SURGERY (CARDIOTHORACIC VASCULAR SURGERY)

## 2020-01-01 PROCEDURE — 85025 COMPLETE CBC W/AUTO DIFF WBC: CPT | Performed by: FAMILY MEDICINE

## 2020-01-01 PROCEDURE — 3E1Y38Z IRRIGATION OF PERICARDIAL CAVITY USING IRRIGATING SUBSTANCE, PERCUTANEOUS APPROACH: ICD-10-PCS | Performed by: THORACIC SURGERY (CARDIOTHORACIC VASCULAR SURGERY)

## 2020-01-01 PROCEDURE — 40000901 ZZH STATISTIC WOC PT EDUCATION, 0-15 MIN

## 2020-01-01 PROCEDURE — 94799 UNLISTED PULMONARY SVC/PX: CPT

## 2020-01-01 PROCEDURE — 83051 HEMOGLOBIN PLASMA: CPT | Performed by: NEUROLOGICAL SURGERY

## 2020-01-01 PROCEDURE — 85018 HEMOGLOBIN: CPT

## 2020-01-01 PROCEDURE — 93325 DOPPLER ECHO COLOR FLOW MAPG: CPT | Mod: 26 | Performed by: INTERNAL MEDICINE

## 2020-01-01 PROCEDURE — 25800030 ZZH RX IP 258 OP 636: Performed by: FAMILY MEDICINE

## 2020-01-01 PROCEDURE — 85004 AUTOMATED DIFF WBC COUNT: CPT | Performed by: SURGERY

## 2020-01-01 PROCEDURE — 83735 ASSAY OF MAGNESIUM: CPT | Performed by: THORACIC SURGERY (CARDIOTHORACIC VASCULAR SURGERY)

## 2020-01-01 PROCEDURE — 83051 HEMOGLOBIN PLASMA: CPT | Performed by: STUDENT IN AN ORGANIZED HEALTH CARE EDUCATION/TRAINING PROGRAM

## 2020-01-01 PROCEDURE — 27210460 ZZH PUMP APP ADULT PERFUSION: Performed by: THORACIC SURGERY (CARDIOTHORACIC VASCULAR SURGERY)

## 2020-01-01 PROCEDURE — 82803 BLOOD GASES ANY COMBINATION: CPT | Performed by: NEUROLOGICAL SURGERY

## 2020-01-01 PROCEDURE — 86850 RBC ANTIBODY SCREEN: CPT | Performed by: STUDENT IN AN ORGANIZED HEALTH CARE EDUCATION/TRAINING PROGRAM

## 2020-01-01 PROCEDURE — 85610 PROTHROMBIN TIME: CPT | Performed by: SURGERY

## 2020-01-01 PROCEDURE — 84295 ASSAY OF SERUM SODIUM: CPT

## 2020-01-01 PROCEDURE — 40000985 XR CHEST PORT 1 VW

## 2020-01-01 PROCEDURE — 25000125 ZZHC RX 250: Performed by: RADIOLOGY

## 2020-01-01 PROCEDURE — 84132 ASSAY OF SERUM POTASSIUM: CPT | Performed by: ANESTHESIOLOGY

## 2020-01-01 PROCEDURE — C1894 INTRO/SHEATH, NON-LASER: HCPCS | Performed by: THORACIC SURGERY (CARDIOTHORACIC VASCULAR SURGERY)

## 2020-01-01 PROCEDURE — 86900 BLOOD TYPING SEROLOGIC ABO: CPT | Performed by: STUDENT IN AN ORGANIZED HEALTH CARE EDUCATION/TRAINING PROGRAM

## 2020-01-01 PROCEDURE — 25000555 ZZHC RX FACTOR IP 250 OP 636: Performed by: NURSE ANESTHETIST, CERTIFIED REGISTERED

## 2020-01-01 PROCEDURE — 86900 BLOOD TYPING SEROLOGIC ABO: CPT | Performed by: NEUROLOGICAL SURGERY

## 2020-01-01 PROCEDURE — 85300 ANTITHROMBIN III ACTIVITY: CPT | Performed by: NEUROLOGICAL SURGERY

## 2020-01-01 PROCEDURE — 80053 COMPREHEN METABOLIC PANEL: CPT | Performed by: THORACIC SURGERY (CARDIOTHORACIC VASCULAR SURGERY)

## 2020-01-01 PROCEDURE — 81001 URINALYSIS AUTO W/SCOPE: CPT | Performed by: PHYSICIAN ASSISTANT

## 2020-01-01 PROCEDURE — 25000125 ZZHC RX 250: Performed by: NEUROLOGICAL SURGERY

## 2020-01-01 PROCEDURE — 81001 URINALYSIS AUTO W/SCOPE: CPT | Performed by: ANESTHESIOLOGY

## 2020-01-01 PROCEDURE — 87106 FUNGI IDENTIFICATION YEAST: CPT | Performed by: STUDENT IN AN ORGANIZED HEALTH CARE EDUCATION/TRAINING PROGRAM

## 2020-01-01 PROCEDURE — 86901 BLOOD TYPING SEROLOGIC RH(D): CPT | Performed by: STUDENT IN AN ORGANIZED HEALTH CARE EDUCATION/TRAINING PROGRAM

## 2020-01-01 PROCEDURE — 96365 THER/PROPH/DIAG IV INF INIT: CPT | Mod: 59 | Performed by: FAMILY MEDICINE

## 2020-01-01 PROCEDURE — 80053 COMPREHEN METABOLIC PANEL: CPT | Performed by: FAMILY MEDICINE

## 2020-01-01 PROCEDURE — 82803 BLOOD GASES ANY COMBINATION: CPT | Performed by: SURGERY

## 2020-01-01 DEVICE — IMPLANTABLE DEVICE: Type: IMPLANTABLE DEVICE | Site: AORTA | Status: FUNCTIONAL

## 2020-01-01 DEVICE — GRAFT PERICARDIUM 6X8CM BOVINE E6P8: Type: IMPLANTABLE DEVICE | Site: AORTA | Status: FUNCTIONAL

## 2020-01-01 DEVICE — LEAD PACER MYOCARDIAL BIPOLAR TEMPORARY 53CM 6495F: Type: IMPLANTABLE DEVICE | Site: AORTA | Status: FUNCTIONAL

## 2020-01-01 DEVICE — GRAFT VASCUTEK GELWEAVE STRAIGHT 8MMX15CM 731508: Type: IMPLANTABLE DEVICE | Site: CHEST | Status: FUNCTIONAL

## 2020-01-01 RX ORDER — ONDANSETRON 4 MG/1
4 TABLET, ORALLY DISINTEGRATING ORAL EVERY 6 HOURS PRN
Status: DISCONTINUED | OUTPATIENT
Start: 2020-01-01 | End: 2020-01-01 | Stop reason: HOSPADM

## 2020-01-01 RX ORDER — ATROPINE SULFATE 10 MG/ML
1-2 SOLUTION/ DROPS OPHTHALMIC
Status: DISCONTINUED | OUTPATIENT
Start: 2020-01-01 | End: 2020-01-01 | Stop reason: HOSPADM

## 2020-01-01 RX ORDER — DEXTROSE MONOHYDRATE, SODIUM CHLORIDE, AND POTASSIUM CHLORIDE 50; 1.49; 4.5 G/1000ML; G/1000ML; G/1000ML
INJECTION, SOLUTION INTRAVENOUS CONTINUOUS
Status: DISCONTINUED | OUTPATIENT
Start: 2020-01-01 | End: 2020-01-01

## 2020-01-01 RX ORDER — LIDOCAINE 40 MG/G
CREAM TOPICAL
Status: DISCONTINUED | OUTPATIENT
Start: 2020-01-01 | End: 2020-01-01

## 2020-01-01 RX ORDER — MUPIROCIN 20 MG/G
0.5 OINTMENT TOPICAL 2 TIMES DAILY
Status: DISCONTINUED | OUTPATIENT
Start: 2020-01-01 | End: 2020-01-01

## 2020-01-01 RX ORDER — ACETAMINOPHEN 325 MG/1
650 TABLET ORAL EVERY 4 HOURS PRN
Status: DISCONTINUED | OUTPATIENT
Start: 2020-01-01 | End: 2020-01-01

## 2020-01-01 RX ORDER — PROCHLORPERAZINE MALEATE 5 MG
5 TABLET ORAL EVERY 6 HOURS PRN
Status: DISCONTINUED | OUTPATIENT
Start: 2020-01-01 | End: 2020-01-01 | Stop reason: HOSPADM

## 2020-01-01 RX ORDER — FUROSEMIDE 10 MG/ML
40 INJECTION INTRAMUSCULAR; INTRAVENOUS EVERY 12 HOURS
Status: DISCONTINUED | OUTPATIENT
Start: 2020-01-01 | End: 2020-01-01

## 2020-01-01 RX ORDER — FUROSEMIDE 10 MG/ML
10 INJECTION INTRAMUSCULAR; INTRAVENOUS ONCE
Status: COMPLETED | OUTPATIENT
Start: 2020-01-01 | End: 2020-01-01

## 2020-01-01 RX ORDER — METOCLOPRAMIDE HYDROCHLORIDE 5 MG/ML
5 INJECTION INTRAMUSCULAR; INTRAVENOUS EVERY 6 HOURS PRN
Status: DISCONTINUED | OUTPATIENT
Start: 2020-01-01 | End: 2020-01-01 | Stop reason: HOSPADM

## 2020-01-01 RX ORDER — HEPARIN SODIUM (PORCINE) LOCK FLUSH IV SOLN 100 UNIT/ML 100 UNIT/ML
5-10 SOLUTION INTRAVENOUS EVERY 30 MIN PRN
Status: DISCONTINUED | OUTPATIENT
Start: 2020-01-01 | End: 2020-01-01

## 2020-01-01 RX ORDER — LIDOCAINE 40 MG/G
CREAM TOPICAL
Status: DISCONTINUED | OUTPATIENT
Start: 2020-01-01 | End: 2020-01-01 | Stop reason: HOSPADM

## 2020-01-01 RX ORDER — GABAPENTIN 250 MG/5ML
100 SOLUTION ORAL 3 TIMES DAILY
Status: COMPLETED | OUTPATIENT
Start: 2020-01-01 | End: 2020-01-01

## 2020-01-01 RX ORDER — CALCIUM CHLORIDE 100 MG/ML
INJECTION INTRAVENOUS; INTRAVENTRICULAR
Status: DISCONTINUED
Start: 2020-01-01 | End: 2020-01-01 | Stop reason: HOSPADM

## 2020-01-01 RX ORDER — CEFEPIME HYDROCHLORIDE 1 G/1
1 INJECTION, POWDER, FOR SOLUTION INTRAMUSCULAR; INTRAVENOUS EVERY 24 HOURS
Status: DISCONTINUED | OUTPATIENT
Start: 2020-01-01 | End: 2020-01-01

## 2020-01-01 RX ORDER — LIDOCAINE HYDROCHLORIDE 10 MG/ML
INJECTION, SOLUTION EPIDURAL; INFILTRATION; INTRACAUDAL; PERINEURAL
Status: COMPLETED
Start: 2020-01-01 | End: 2020-01-01

## 2020-01-01 RX ORDER — LEVALBUTEROL INHALATION SOLUTION 1.25 MG/3ML
1.25 SOLUTION RESPIRATORY (INHALATION) EVERY 4 HOURS PRN
Status: DISCONTINUED | OUTPATIENT
Start: 2020-01-01 | End: 2020-01-01

## 2020-01-01 RX ORDER — AMOXICILLIN 250 MG
1 CAPSULE ORAL 2 TIMES DAILY
Status: DISCONTINUED | OUTPATIENT
Start: 2020-01-01 | End: 2020-01-01

## 2020-01-01 RX ORDER — PROTAMINE SULFATE 10 MG/ML
INJECTION, SOLUTION INTRAVENOUS PRN
Status: DISCONTINUED | OUTPATIENT
Start: 2020-01-01 | End: 2020-01-01

## 2020-01-01 RX ORDER — ACETAMINOPHEN 650 MG/1
650 SUPPOSITORY RECTAL EVERY 4 HOURS PRN
Status: DISCONTINUED | OUTPATIENT
Start: 2020-01-01 | End: 2020-01-01

## 2020-01-01 RX ORDER — PROPOFOL 10 MG/ML
INJECTION, EMULSION INTRAVENOUS
Status: COMPLETED
Start: 2020-01-01 | End: 2020-01-01

## 2020-01-01 RX ORDER — PROPOFOL 10 MG/ML
INJECTION, EMULSION INTRAVENOUS
Status: COMPLETED | OUTPATIENT
Start: 2020-01-01 | End: 2020-01-01

## 2020-01-01 RX ORDER — POTASSIUM CHLORIDE 750 MG/1
20-40 TABLET, EXTENDED RELEASE ORAL
Status: DISCONTINUED | OUTPATIENT
Start: 2020-01-01 | End: 2020-01-01

## 2020-01-01 RX ORDER — PROPOFOL 10 MG/ML
5-75 INJECTION, EMULSION INTRAVENOUS CONTINUOUS
Status: DISCONTINUED | OUTPATIENT
Start: 2020-01-01 | End: 2020-01-01 | Stop reason: HOSPADM

## 2020-01-01 RX ORDER — FUROSEMIDE 10 MG/ML
20 INJECTION INTRAMUSCULAR; INTRAVENOUS ONCE
Status: COMPLETED | OUTPATIENT
Start: 2020-01-01 | End: 2020-01-01

## 2020-01-01 RX ORDER — ONDANSETRON 2 MG/ML
4 INJECTION INTRAMUSCULAR; INTRAVENOUS EVERY 6 HOURS PRN
Status: DISCONTINUED | OUTPATIENT
Start: 2020-01-01 | End: 2020-01-01

## 2020-01-01 RX ORDER — ONDANSETRON 2 MG/ML
4 INJECTION INTRAMUSCULAR; INTRAVENOUS EVERY 6 HOURS PRN
Status: DISCONTINUED | OUTPATIENT
Start: 2020-01-01 | End: 2020-01-01 | Stop reason: HOSPADM

## 2020-01-01 RX ORDER — OXYCODONE HYDROCHLORIDE 5 MG/1
5-10 TABLET ORAL EVERY 4 HOURS PRN
Status: DISCONTINUED | OUTPATIENT
Start: 2020-01-01 | End: 2020-01-01 | Stop reason: HOSPADM

## 2020-01-01 RX ORDER — ONDANSETRON 4 MG/1
4 TABLET, ORALLY DISINTEGRATING ORAL EVERY 6 HOURS PRN
Status: DISCONTINUED | OUTPATIENT
Start: 2020-01-01 | End: 2020-01-01

## 2020-01-01 RX ORDER — METOPROLOL TARTRATE 25 MG/1
25 TABLET, FILM COATED ORAL 2 TIMES DAILY
Status: DISCONTINUED | OUTPATIENT
Start: 2020-01-01 | End: 2020-01-01

## 2020-01-01 RX ORDER — NOREPINEPHRINE BITARTRATE 0.06 MG/ML
0.03-0.4 INJECTION, SOLUTION INTRAVENOUS CONTINUOUS
Status: DISCONTINUED | OUTPATIENT
Start: 2020-01-01 | End: 2020-01-01

## 2020-01-01 RX ORDER — GLYCOPYRROLATE 0.2 MG/ML
.1-.2 INJECTION, SOLUTION INTRAMUSCULAR; INTRAVENOUS EVERY 4 HOURS PRN
Status: DISCONTINUED | OUTPATIENT
Start: 2020-01-01 | End: 2020-01-01 | Stop reason: HOSPADM

## 2020-01-01 RX ORDER — GLYCOPYRROLATE 0.2 MG/ML
0.2 INJECTION, SOLUTION INTRAMUSCULAR; INTRAVENOUS ONCE
Status: DISCONTINUED | OUTPATIENT
Start: 2020-01-01 | End: 2020-01-01 | Stop reason: HOSPADM

## 2020-01-01 RX ORDER — ACETAZOLAMIDE 500 MG/5ML
1000 INJECTION, POWDER, LYOPHILIZED, FOR SOLUTION INTRAVENOUS ONCE
Status: COMPLETED | OUTPATIENT
Start: 2020-01-01 | End: 2020-01-01

## 2020-01-01 RX ORDER — METOCLOPRAMIDE 5 MG/1
5 TABLET ORAL EVERY 6 HOURS PRN
Status: DISCONTINUED | OUTPATIENT
Start: 2020-01-01 | End: 2020-01-01 | Stop reason: HOSPADM

## 2020-01-01 RX ORDER — METHYLPREDNISOLONE SODIUM SUCCINATE 500 MG/8ML
INJECTION INTRAMUSCULAR; INTRAVENOUS PRN
Status: DISCONTINUED | OUTPATIENT
Start: 2020-01-01 | End: 2020-01-01

## 2020-01-01 RX ORDER — CEFTRIAXONE 2 G/1
2 INJECTION, POWDER, FOR SOLUTION INTRAMUSCULAR; INTRAVENOUS EVERY 24 HOURS
Status: DISCONTINUED | OUTPATIENT
Start: 2020-01-01 | End: 2020-01-01

## 2020-01-01 RX ORDER — MAGNESIUM SULFATE HEPTAHYDRATE 40 MG/ML
4 INJECTION, SOLUTION INTRAVENOUS EVERY 4 HOURS PRN
Status: DISCONTINUED | OUTPATIENT
Start: 2020-01-01 | End: 2020-01-01

## 2020-01-01 RX ORDER — LIDOCAINE HYDROCHLORIDE 20 MG/ML
5 SOLUTION OROPHARYNGEAL ONCE
Status: COMPLETED | OUTPATIENT
Start: 2020-01-01 | End: 2020-01-01

## 2020-01-01 RX ORDER — POTASSIUM CL/LIDO/0.9 % NACL 10MEQ/0.1L
10 INTRAVENOUS SOLUTION, PIGGYBACK (ML) INTRAVENOUS
Status: DISCONTINUED | OUTPATIENT
Start: 2020-01-01 | End: 2020-01-01

## 2020-01-01 RX ORDER — POTASSIUM CHLORIDE 7.45 MG/ML
10 INJECTION INTRAVENOUS
Status: DISCONTINUED | OUTPATIENT
Start: 2020-01-01 | End: 2020-01-01

## 2020-01-01 RX ORDER — LORAZEPAM 2 MG/ML
1-2 INJECTION INTRAMUSCULAR EVERY 10 MIN PRN
Status: DISCONTINUED | OUTPATIENT
Start: 2020-01-01 | End: 2020-01-01 | Stop reason: HOSPADM

## 2020-01-01 RX ORDER — FENTANYL CITRATE 50 UG/ML
INJECTION, SOLUTION INTRAMUSCULAR; INTRAVENOUS PRN
Status: DISCONTINUED | OUTPATIENT
Start: 2020-01-01 | End: 2020-01-01

## 2020-01-01 RX ORDER — SILDENAFIL 50 MG/1
50 TABLET, FILM COATED ORAL DAILY PRN
COMMUNITY

## 2020-01-01 RX ORDER — DEXMEDETOMIDINE HYDROCHLORIDE 4 UG/ML
0.2-1.2 INJECTION, SOLUTION INTRAVENOUS CONTINUOUS
Status: DISCONTINUED | OUTPATIENT
Start: 2020-01-01 | End: 2020-01-01 | Stop reason: HOSPADM

## 2020-01-01 RX ORDER — HYDROMORPHONE HYDROCHLORIDE 1 MG/ML
.3-.5 INJECTION, SOLUTION INTRAMUSCULAR; INTRAVENOUS; SUBCUTANEOUS
Status: DISCONTINUED | OUTPATIENT
Start: 2020-01-01 | End: 2020-01-01

## 2020-01-01 RX ORDER — HEPARIN SODIUM 10000 [USP'U]/100ML
10-50 INJECTION, SOLUTION INTRAVENOUS CONTINUOUS
Status: DISCONTINUED | OUTPATIENT
Start: 2020-01-01 | End: 2020-01-01

## 2020-01-01 RX ORDER — NICOTINE POLACRILEX 4 MG
15-30 LOZENGE BUCCAL
Status: DISCONTINUED | OUTPATIENT
Start: 2020-01-01 | End: 2020-01-01

## 2020-01-01 RX ORDER — ACETAMINOPHEN 325 MG/1
975 TABLET ORAL EVERY 8 HOURS
Status: DISPENSED | OUTPATIENT
Start: 2020-01-01 | End: 2020-01-01

## 2020-01-01 RX ORDER — HYDRALAZINE HYDROCHLORIDE 20 MG/ML
10 INJECTION INTRAMUSCULAR; INTRAVENOUS EVERY 30 MIN PRN
Status: DISCONTINUED | OUTPATIENT
Start: 2020-01-01 | End: 2020-01-01

## 2020-01-01 RX ORDER — LORAZEPAM 2 MG/ML
1 INJECTION INTRAMUSCULAR ONCE
Status: DISCONTINUED | OUTPATIENT
Start: 2020-01-01 | End: 2020-01-01 | Stop reason: HOSPADM

## 2020-01-01 RX ORDER — FUROSEMIDE 10 MG/ML
20 INJECTION INTRAMUSCULAR; INTRAVENOUS EVERY 12 HOURS
Status: DISCONTINUED | OUTPATIENT
Start: 2020-01-01 | End: 2020-01-01

## 2020-01-01 RX ORDER — ALUMINA, MAGNESIA, AND SIMETHICONE 2400; 2400; 240 MG/30ML; MG/30ML; MG/30ML
30 SUSPENSION ORAL EVERY 4 HOURS PRN
Status: DISCONTINUED | OUTPATIENT
Start: 2020-01-01 | End: 2020-01-01

## 2020-01-01 RX ORDER — DEXMEDETOMIDINE HYDROCHLORIDE 4 UG/ML
.2-.7 INJECTION, SOLUTION INTRAVENOUS CONTINUOUS
Status: DISCONTINUED | OUTPATIENT
Start: 2020-01-01 | End: 2020-01-01 | Stop reason: HOSPADM

## 2020-01-01 RX ORDER — SODIUM CHLORIDE, SODIUM GLUCONATE, SODIUM ACETATE, POTASSIUM CHLORIDE AND MAGNESIUM CHLORIDE 526; 502; 368; 37; 30 MG/100ML; MG/100ML; MG/100ML; MG/100ML; MG/100ML
INJECTION, SOLUTION INTRAVENOUS CONTINUOUS PRN
Status: DISCONTINUED | OUTPATIENT
Start: 2020-01-01 | End: 2020-01-01

## 2020-01-01 RX ORDER — DEXTROSE MONOHYDRATE 100 MG/ML
INJECTION, SOLUTION INTRAVENOUS CONTINUOUS PRN
Status: DISCONTINUED | OUTPATIENT
Start: 2020-01-01 | End: 2020-01-01

## 2020-01-01 RX ORDER — FUROSEMIDE 10 MG/ML
20 INJECTION INTRAMUSCULAR; INTRAVENOUS
Status: DISCONTINUED | OUTPATIENT
Start: 2020-01-01 | End: 2020-01-01

## 2020-01-01 RX ORDER — DEXTROSE MONOHYDRATE 25 G/50ML
25-50 INJECTION, SOLUTION INTRAVENOUS
Status: DISCONTINUED | OUTPATIENT
Start: 2020-01-01 | End: 2020-01-01

## 2020-01-01 RX ORDER — HYDROCODONE BITARTRATE AND ACETAMINOPHEN 5; 325 MG/1; MG/1
1-2 TABLET ORAL EVERY 4 HOURS PRN
Status: DISCONTINUED | OUTPATIENT
Start: 2020-01-01 | End: 2020-01-01

## 2020-01-01 RX ORDER — NALOXONE HYDROCHLORIDE 0.4 MG/ML
.1-.4 INJECTION, SOLUTION INTRAMUSCULAR; INTRAVENOUS; SUBCUTANEOUS
Status: DISCONTINUED | OUTPATIENT
Start: 2020-01-01 | End: 2020-01-01

## 2020-01-01 RX ORDER — LATANOPROST 50 UG/ML
1 SOLUTION/ DROPS OPHTHALMIC AT BEDTIME
COMMUNITY

## 2020-01-01 RX ORDER — AMOXICILLIN 250 MG
2 CAPSULE ORAL 2 TIMES DAILY
Status: DISCONTINUED | OUTPATIENT
Start: 2020-01-01 | End: 2020-01-01

## 2020-01-01 RX ORDER — ALBUMIN, HUMAN INJ 5% 5 %
500-1000 SOLUTION INTRAVENOUS
Status: COMPLETED | OUTPATIENT
Start: 2020-01-01 | End: 2020-01-01

## 2020-01-01 RX ORDER — ESMOLOL HYDROCHLORIDE 20 MG/ML
50-300 INJECTION, SOLUTION INTRAVENOUS CONTINUOUS
Status: DISCONTINUED | OUTPATIENT
Start: 2020-01-01 | End: 2020-01-01

## 2020-01-01 RX ORDER — PROCHLORPERAZINE 25 MG
12.5 SUPPOSITORY, RECTAL RECTAL EVERY 12 HOURS PRN
Status: DISCONTINUED | OUTPATIENT
Start: 2020-01-01 | End: 2020-01-01 | Stop reason: HOSPADM

## 2020-01-01 RX ORDER — HEPARIN SODIUM 1000 [USP'U]/ML
INJECTION, SOLUTION INTRAVENOUS; SUBCUTANEOUS PRN
Status: DISCONTINUED | OUTPATIENT
Start: 2020-01-01 | End: 2020-01-01

## 2020-01-01 RX ORDER — GABAPENTIN 100 MG/1
100 CAPSULE ORAL 3 TIMES DAILY
Status: DISCONTINUED | OUTPATIENT
Start: 2020-01-01 | End: 2020-01-01

## 2020-01-01 RX ORDER — HYDROMORPHONE HCL/0.9% NACL/PF 0.2MG/0.2
.2-.5 SYRINGE (ML) INTRAVENOUS
Status: DISCONTINUED | OUTPATIENT
Start: 2020-01-01 | End: 2020-01-01

## 2020-01-01 RX ORDER — CALCIUM CHLORIDE 100 MG/ML
INJECTION INTRAVENOUS; INTRAVENTRICULAR PRN
Status: DISCONTINUED | OUTPATIENT
Start: 2020-01-01 | End: 2020-01-01

## 2020-01-01 RX ORDER — ESMOLOL HYDROCHLORIDE 20 MG/ML
50-300 INJECTION, SOLUTION INTRAVENOUS CONTINUOUS
Status: DISCONTINUED | OUTPATIENT
Start: 2020-01-01 | End: 2020-01-01 | Stop reason: CLARIF

## 2020-01-01 RX ORDER — ACETYLCYSTEINE 100 MG/ML
4 SOLUTION ORAL; RESPIRATORY (INHALATION) EVERY 4 HOURS
Status: DISCONTINUED | OUTPATIENT
Start: 2020-01-01 | End: 2020-01-01

## 2020-01-01 RX ORDER — MAGNESIUM SULFATE HEPTAHYDRATE 40 MG/ML
INJECTION, SOLUTION INTRAVENOUS PRN
Status: DISCONTINUED | OUTPATIENT
Start: 2020-01-01 | End: 2020-01-01

## 2020-01-01 RX ORDER — PIPERACILLIN SODIUM, TAZOBACTAM SODIUM 3; .375 G/15ML; G/15ML
3.38 INJECTION, POWDER, LYOPHILIZED, FOR SOLUTION INTRAVENOUS EVERY 6 HOURS
Status: DISCONTINUED | OUTPATIENT
Start: 2020-01-01 | End: 2020-01-01

## 2020-01-01 RX ORDER — AMOXICILLIN 250 MG
1 CAPSULE ORAL 2 TIMES DAILY PRN
Status: DISCONTINUED | OUTPATIENT
Start: 2020-01-01 | End: 2020-01-01

## 2020-01-01 RX ORDER — AMOXICILLIN 250 MG
2 CAPSULE ORAL 2 TIMES DAILY PRN
Status: DISCONTINUED | OUTPATIENT
Start: 2020-01-01 | End: 2020-01-01

## 2020-01-01 RX ORDER — OXYCODONE HYDROCHLORIDE 5 MG/1
5 TABLET ORAL EVERY 4 HOURS PRN
Status: DISCONTINUED | OUTPATIENT
Start: 2020-01-01 | End: 2020-01-01

## 2020-01-01 RX ORDER — FENTANYL CITRATE 50 UG/ML
100 INJECTION, SOLUTION INTRAMUSCULAR; INTRAVENOUS ONCE
Status: DISCONTINUED | OUTPATIENT
Start: 2020-01-01 | End: 2020-01-01 | Stop reason: HOSPADM

## 2020-01-01 RX ORDER — ALBUMIN, HUMAN INJ 5% 5 %
25 SOLUTION INTRAVENOUS ONCE
Status: COMPLETED | OUTPATIENT
Start: 2020-01-01 | End: 2020-01-01

## 2020-01-01 RX ORDER — PROTAMINE SULFATE 10 MG/ML
50 INJECTION, SOLUTION INTRAVENOUS ONCE
Status: COMPLETED | OUTPATIENT
Start: 2020-01-01 | End: 2020-01-01

## 2020-01-01 RX ORDER — WARFARIN SODIUM 4 MG/1
TABLET ORAL
COMMUNITY

## 2020-01-01 RX ORDER — TIMOLOL MALEATE 2.5 MG/ML
1 SOLUTION/ DROPS OPHTHALMIC EVERY MORNING
Status: DISCONTINUED | OUTPATIENT
Start: 2020-01-01 | End: 2020-01-01

## 2020-01-01 RX ORDER — PROCHLORPERAZINE MALEATE 5 MG
5 TABLET ORAL EVERY 6 HOURS PRN
Status: DISCONTINUED | OUTPATIENT
Start: 2020-01-01 | End: 2020-01-01

## 2020-01-01 RX ORDER — HEPARIN SODIUM 10000 [USP'U]/100ML
0-3500 INJECTION, SOLUTION INTRAVENOUS CONTINUOUS
Status: DISCONTINUED | OUTPATIENT
Start: 2020-01-01 | End: 2020-01-01 | Stop reason: DRUGHIGH

## 2020-01-01 RX ORDER — HEPARIN SODIUM 10000 [USP'U]/100ML
0-3500 INJECTION, SOLUTION INTRAVENOUS CONTINUOUS
Status: DISCONTINUED | OUTPATIENT
Start: 2020-01-01 | End: 2020-01-01

## 2020-01-01 RX ORDER — AMINO AC/PROTEIN HYDR/WHEY PRO 10G-100/30
1 LIQUID (ML) ORAL 3 TIMES DAILY
Status: DISCONTINUED | OUTPATIENT
Start: 2020-01-01 | End: 2020-01-01

## 2020-01-01 RX ORDER — HYDROMORPHONE HCL/0.9% NACL/PF 0.2MG/0.2
0.2 SYRINGE (ML) INTRAVENOUS ONCE
Status: DISCONTINUED | OUTPATIENT
Start: 2020-01-01 | End: 2020-01-01

## 2020-01-01 RX ORDER — CALCIUM GLUCONATE 94 MG/ML
1 INJECTION, SOLUTION INTRAVENOUS ONCE
Status: DISCONTINUED | OUTPATIENT
Start: 2020-01-01 | End: 2020-01-01

## 2020-01-01 RX ORDER — DEXMEDETOMIDINE HYDROCHLORIDE 4 UG/ML
0.2-0.7 INJECTION, SOLUTION INTRAVENOUS CONTINUOUS
Status: DISCONTINUED | OUTPATIENT
Start: 2020-01-01 | End: 2020-01-01

## 2020-01-01 RX ORDER — TIMOLOL MALEATE 2.5 MG/ML
1 SOLUTION/ DROPS OPHTHALMIC EVERY MORNING
COMMUNITY

## 2020-01-01 RX ORDER — PROPOFOL 10 MG/ML
5-75 INJECTION, EMULSION INTRAVENOUS CONTINUOUS
Status: DISCONTINUED | OUTPATIENT
Start: 2020-01-01 | End: 2020-01-01

## 2020-01-01 RX ORDER — CALCIUM CHLORIDE 100 MG/ML
INJECTION INTRAVENOUS; INTRAVENTRICULAR
Status: COMPLETED
Start: 2020-01-01 | End: 2020-01-01

## 2020-01-01 RX ORDER — BUMETANIDE 0.25 MG/ML
2 INJECTION INTRAMUSCULAR; INTRAVENOUS ONCE
Status: COMPLETED | OUTPATIENT
Start: 2020-01-01 | End: 2020-01-01

## 2020-01-01 RX ORDER — ALBUMIN, HUMAN INJ 5% 5 %
SOLUTION INTRAVENOUS
Status: DISPENSED
Start: 2020-01-01 | End: 2020-01-01

## 2020-01-01 RX ORDER — VANCOMYCIN HYDROCHLORIDE 1 G/200ML
INJECTION, SOLUTION INTRAVENOUS PRN
Status: DISCONTINUED | OUTPATIENT
Start: 2020-01-01 | End: 2020-01-01

## 2020-01-01 RX ORDER — POTASSIUM CHLORIDE 29.8 MG/ML
20 INJECTION INTRAVENOUS
Status: DISCONTINUED | OUTPATIENT
Start: 2020-01-01 | End: 2020-01-01

## 2020-01-01 RX ORDER — DEXMEDETOMIDINE HYDROCHLORIDE 4 UG/ML
0.2-0.7 INJECTION, SOLUTION INTRAVENOUS CONTINUOUS
Status: DISCONTINUED | OUTPATIENT
Start: 2020-01-01 | End: 2020-01-01 | Stop reason: DRUGHIGH

## 2020-01-01 RX ORDER — PROCHLORPERAZINE 25 MG
12.5 SUPPOSITORY, RECTAL RECTAL EVERY 12 HOURS PRN
Status: DISCONTINUED | OUTPATIENT
Start: 2020-01-01 | End: 2020-01-01

## 2020-01-01 RX ORDER — POLYETHYLENE GLYCOL 3350 17 G/17G
17 POWDER, FOR SOLUTION ORAL DAILY
Status: DISCONTINUED | OUTPATIENT
Start: 2020-01-01 | End: 2020-01-01

## 2020-01-01 RX ORDER — BISACODYL 10 MG
10 SUPPOSITORY, RECTAL RECTAL DAILY PRN
Status: DISCONTINUED | OUTPATIENT
Start: 2020-01-01 | End: 2020-01-01

## 2020-01-01 RX ORDER — POTASSIUM CHLORIDE 1.5 G/1.58G
20-40 POWDER, FOR SOLUTION ORAL
Status: DISCONTINUED | OUTPATIENT
Start: 2020-01-01 | End: 2020-01-01

## 2020-01-01 RX ORDER — METOPROLOL TARTRATE 1 MG/ML
5 INJECTION, SOLUTION INTRAVENOUS ONCE
Status: COMPLETED | OUTPATIENT
Start: 2020-01-01 | End: 2020-01-01

## 2020-01-01 RX ORDER — HYDROXYZINE HYDROCHLORIDE 10 MG/1
10 TABLET, FILM COATED ORAL EVERY 6 HOURS PRN
Status: DISCONTINUED | OUTPATIENT
Start: 2020-01-01 | End: 2020-01-01 | Stop reason: HOSPADM

## 2020-01-01 RX ORDER — PROPOFOL 10 MG/ML
INJECTION, EMULSION INTRAVENOUS PRN
Status: DISCONTINUED | OUTPATIENT
Start: 2020-01-01 | End: 2020-01-01

## 2020-01-01 RX ORDER — LATANOPROST 50 UG/ML
1 SOLUTION/ DROPS OPHTHALMIC AT BEDTIME
Status: DISCONTINUED | OUTPATIENT
Start: 2020-01-01 | End: 2020-01-01

## 2020-01-01 RX ORDER — FENTANYL CITRATE 50 UG/ML
100-200 INJECTION, SOLUTION INTRAMUSCULAR; INTRAVENOUS
Status: COMPLETED | OUTPATIENT
Start: 2020-01-01 | End: 2020-01-01

## 2020-01-01 RX ORDER — MEPERIDINE HYDROCHLORIDE 25 MG/ML
12.5-25 INJECTION INTRAMUSCULAR; INTRAVENOUS; SUBCUTANEOUS
Status: DISCONTINUED | OUTPATIENT
Start: 2020-01-01 | End: 2020-01-01 | Stop reason: HOSPADM

## 2020-01-01 RX ORDER — MUPIROCIN 20 MG/G
1 OINTMENT TOPICAL 2 TIMES DAILY
Status: DISPENSED | OUTPATIENT
Start: 2020-01-01 | End: 2020-01-01

## 2020-01-01 RX ORDER — FUROSEMIDE 10 MG/ML
INJECTION INTRAMUSCULAR; INTRAVENOUS PRN
Status: DISCONTINUED | OUTPATIENT
Start: 2020-01-01 | End: 2020-01-01

## 2020-01-01 RX ADMIN — MIDAZOLAM 1 MG: 1 INJECTION INTRAMUSCULAR; INTRAVENOUS at 14:40

## 2020-01-01 RX ADMIN — POLYETHYLENE GLYCOL 3350 17 G: 17 POWDER, FOR SOLUTION ORAL at 08:39

## 2020-01-01 RX ADMIN — CALCIUM CHLORIDE 2000 MG: 100 INJECTION, SOLUTION INTRAVENOUS at 21:30

## 2020-01-01 RX ADMIN — POTASSIUM CHLORIDE 20 MEQ: 29.8 INJECTION, SOLUTION INTRAVENOUS at 03:44

## 2020-01-01 RX ADMIN — METRONIDAZOLE 500 MG: 500 INJECTION, SOLUTION INTRAVENOUS at 01:49

## 2020-01-01 RX ADMIN — ACETAMINOPHEN 975 MG: 325 TABLET, FILM COATED ORAL at 05:57

## 2020-01-01 RX ADMIN — PIPERACILLIN AND TAZOBACTAM 3.38 G: 3; .375 INJECTION, POWDER, FOR SOLUTION INTRAVENOUS at 00:08

## 2020-01-01 RX ADMIN — CALCIUM GLUCONATE 1 G: 98 INJECTION, SOLUTION INTRAVENOUS at 21:56

## 2020-01-01 RX ADMIN — MUPIROCIN 1 G: 20 OINTMENT TOPICAL at 19:48

## 2020-01-01 RX ADMIN — PROTAMINE SULFATE 50 MG: 10 INJECTION, SOLUTION INTRAVENOUS at 22:56

## 2020-01-01 RX ADMIN — FUROSEMIDE 20 MG: 20 INJECTION, SOLUTION INTRAMUSCULAR; INTRAVENOUS at 17:27

## 2020-01-01 RX ADMIN — NOREPINEPHRINE BITARTRATE 6.4 MCG: 1 INJECTION INTRAVENOUS at 14:44

## 2020-01-01 RX ADMIN — METRONIDAZOLE 500 MG: 500 INJECTION, SOLUTION INTRAVENOUS at 20:42

## 2020-01-01 RX ADMIN — POTASSIUM & SODIUM PHOSPHATES POWDER PACK 280-160-250 MG 1 PACKET: 280-160-250 PACK at 08:51

## 2020-01-01 RX ADMIN — TIMOLOL MALEATE 1 DROP: 2.5 SOLUTION OPHTHALMIC at 08:52

## 2020-01-01 RX ADMIN — METRONIDAZOLE 500 MG: 500 INJECTION, SOLUTION INTRAVENOUS at 19:43

## 2020-01-01 RX ADMIN — METRONIDAZOLE 500 MG: 500 INJECTION, SOLUTION INTRAVENOUS at 02:02

## 2020-01-01 RX ADMIN — ACETYLCYSTEINE 4 ML: 100 INHALANT RESPIRATORY (INHALATION) at 16:30

## 2020-01-01 RX ADMIN — CALCIUM CHLORIDE 1000 MG: 100 INJECTION, SOLUTION INTRAVENOUS at 22:41

## 2020-01-01 RX ADMIN — CALCIUM CHLORIDE 1000 MG: 100 INJECTION, SOLUTION INTRAVENOUS at 22:49

## 2020-01-01 RX ADMIN — CEFEPIME HYDROCHLORIDE 1 G: 2 INJECTION, POWDER, FOR SOLUTION INTRAVENOUS at 07:56

## 2020-01-01 RX ADMIN — PANTOPRAZOLE SODIUM 40 MG: 40 INJECTION, POWDER, FOR SOLUTION INTRAVENOUS at 08:59

## 2020-01-01 RX ADMIN — PROPOFOL 20 MCG/KG/MIN: 10 INJECTION, EMULSION INTRAVENOUS at 13:18

## 2020-01-01 RX ADMIN — CALCIUM CHLORIDE 2000 MG: 100 INJECTION, SOLUTION INTRAVENOUS at 22:09

## 2020-01-01 RX ADMIN — FUROSEMIDE 20 MG: 10 INJECTION, SOLUTION INTRAVENOUS at 01:13

## 2020-01-01 RX ADMIN — FENTANYL CITRATE 150 MCG: 50 INJECTION, SOLUTION INTRAMUSCULAR; INTRAVENOUS at 16:46

## 2020-01-01 RX ADMIN — MULTIVITAMIN 15 ML: LIQUID ORAL at 15:10

## 2020-01-01 RX ADMIN — HUMAN INSULIN 0.5 UNITS/HR: 100 INJECTION, SOLUTION SUBCUTANEOUS at 19:29

## 2020-01-01 RX ADMIN — SODIUM PHOSPHATE, MONOBASIC, MONOHYDRATE AND SODIUM PHOSPHATE, DIBASIC, ANHYDROUS 15 MMOL: 276; 142 INJECTION, SOLUTION INTRAVENOUS at 00:08

## 2020-01-01 RX ADMIN — HEPARIN SODIUM 1380 UNITS/HR: 10000 INJECTION, SOLUTION INTRAVENOUS at 10:47

## 2020-01-01 RX ADMIN — ACETAMINOPHEN 975 MG: 325 TABLET, FILM COATED ORAL at 15:10

## 2020-01-01 RX ADMIN — NOREPINEPHRINE BITARTRATE 12.8 MCG: 1 INJECTION INTRAVENOUS at 20:00

## 2020-01-01 RX ADMIN — LATANOPROST 1 DROP: 50 SOLUTION OPHTHALMIC at 21:33

## 2020-01-01 RX ADMIN — POTASSIUM CHLORIDE 20 MEQ: 1.5 POWDER, FOR SOLUTION ORAL at 17:35

## 2020-01-01 RX ADMIN — GABAPENTIN 100 MG: 250 SOLUTION ORAL at 19:42

## 2020-01-01 RX ADMIN — SODIUM BICARBONATE 50 MEQ: 84 INJECTION, SOLUTION INTRAVENOUS at 00:14

## 2020-01-01 RX ADMIN — PROTAMINE SULFATE 50 MG: 10 INJECTION, SOLUTION INTRAVENOUS at 07:48

## 2020-01-01 RX ADMIN — CALCIUM CHLORIDE 2000 MG: 100 INJECTION, SOLUTION INTRAVENOUS at 00:12

## 2020-01-01 RX ADMIN — NICARDIPINE HYDROCHLORIDE 7.5 MG/HR: 0.2 INJECTION, SOLUTION INTRAVENOUS at 20:58

## 2020-01-01 RX ADMIN — POTASSIUM CHLORIDE 20 MEQ: 29.8 INJECTION, SOLUTION INTRAVENOUS at 00:27

## 2020-01-01 RX ADMIN — ACETYLCYSTEINE 4 ML: 100 INHALANT RESPIRATORY (INHALATION) at 08:21

## 2020-01-01 RX ADMIN — SODIUM PHOSPHATE, MONOBASIC, MONOHYDRATE AND SODIUM PHOSPHATE, DIBASIC, ANHYDROUS 15 MMOL: 276; 142 INJECTION, SOLUTION INTRAVENOUS at 07:40

## 2020-01-01 RX ADMIN — POTASSIUM CHLORIDE 20 MEQ: 1.5 POWDER, FOR SOLUTION ORAL at 14:02

## 2020-01-01 RX ADMIN — ACETYLCYSTEINE 4 ML: 100 INHALANT RESPIRATORY (INHALATION) at 04:49

## 2020-01-01 RX ADMIN — PROTAMINE SULFATE 250 MG: 10 INJECTION, SOLUTION INTRAVENOUS at 20:32

## 2020-01-01 RX ADMIN — Medication 2 G: at 16:51

## 2020-01-01 RX ADMIN — AMINOCAPROIC ACID: 250 INJECTION, SOLUTION INTRAVENOUS at 15:26

## 2020-01-01 RX ADMIN — COAGULATION FACTOR VIIA (RECOMBINANT) 1 MG: KIT at 21:01

## 2020-01-01 RX ADMIN — PIPERACILLIN AND TAZOBACTAM 3.38 G: 3; .375 INJECTION, POWDER, FOR SOLUTION INTRAVENOUS at 23:04

## 2020-01-01 RX ADMIN — POTASSIUM CHLORIDE 20 MEQ: 29.8 INJECTION, SOLUTION INTRAVENOUS at 13:43

## 2020-01-01 RX ADMIN — HEPARIN SODIUM 10000 UNITS: 1000 INJECTION, SOLUTION INTRAVENOUS; SUBCUTANEOUS at 14:07

## 2020-01-01 RX ADMIN — METRONIDAZOLE 500 MG: 500 INJECTION, SOLUTION INTRAVENOUS at 08:39

## 2020-01-01 RX ADMIN — PIPERACILLIN AND TAZOBACTAM 3.38 G: 3; .375 INJECTION, POWDER, FOR SOLUTION INTRAVENOUS at 06:30

## 2020-01-01 RX ADMIN — FENTANYL CITRATE 100 MCG: 50 INJECTION, SOLUTION INTRAMUSCULAR; INTRAVENOUS at 00:48

## 2020-01-01 RX ADMIN — MULTIVITAMIN 15 ML: LIQUID ORAL at 08:59

## 2020-01-01 RX ADMIN — PHENYLEPHRINE HYDROCHLORIDE 0.8 MCG/KG/MIN: 10 INJECTION INTRAVENOUS at 21:20

## 2020-01-01 RX ADMIN — ESMOLOL HYDROCHLORIDE IN SODIUM CHLORIDE 200 MCG/KG/MIN: 20 INJECTION INTRAVENOUS at 16:50

## 2020-01-01 RX ADMIN — FUROSEMIDE 20 MG: 10 INJECTION, SOLUTION INTRAVENOUS at 16:22

## 2020-01-01 RX ADMIN — ROCURONIUM BROMIDE 50 MG: 10 INJECTION INTRAVENOUS at 14:38

## 2020-01-01 RX ADMIN — ALBUMIN HUMAN 25 G: 0.05 INJECTION, SOLUTION INTRAVENOUS at 08:45

## 2020-01-01 RX ADMIN — EPINEPHRINE 0.05 MCG/KG/MIN: 1 INJECTION PARENTERAL at 12:56

## 2020-01-01 RX ADMIN — HEPARIN SODIUM 700 UNITS/HR: 10000 INJECTION, SOLUTION INTRAVENOUS at 21:55

## 2020-01-01 RX ADMIN — POTASSIUM & SODIUM PHOSPHATES POWDER PACK 280-160-250 MG 1 PACKET: 280-160-250 PACK at 12:23

## 2020-01-01 RX ADMIN — POTASSIUM & SODIUM PHOSPHATES POWDER PACK 280-160-250 MG 1 PACKET: 280-160-250 PACK at 19:48

## 2020-01-01 RX ADMIN — POTASSIUM CHLORIDE 20 MEQ: 1.5 POWDER, FOR SOLUTION ORAL at 16:51

## 2020-01-01 RX ADMIN — CEFEPIME HYDROCHLORIDE 1 G: 1 INJECTION, POWDER, FOR SOLUTION INTRAMUSCULAR; INTRAVENOUS at 07:42

## 2020-01-01 RX ADMIN — FUROSEMIDE 10 MG: 20 INJECTION, SOLUTION INTRAMUSCULAR; INTRAVENOUS at 05:33

## 2020-01-01 RX ADMIN — FUROSEMIDE 10 MG: 10 INJECTION, SOLUTION INTRAVENOUS at 10:36

## 2020-01-01 RX ADMIN — CEFTRIAXONE SODIUM 2 G: 2 INJECTION, POWDER, FOR SOLUTION INTRAMUSCULAR; INTRAVENOUS at 13:45

## 2020-01-01 RX ADMIN — ESMOLOL HYDROCHLORIDE IN SODIUM CHLORIDE 250 MCG/KG/MIN: 20 INJECTION INTRAVENOUS at 09:30

## 2020-01-01 RX ADMIN — ALBUMIN HUMAN 500 ML: 0.05 INJECTION, SOLUTION INTRAVENOUS at 10:20

## 2020-01-01 RX ADMIN — POLYETHYLENE GLYCOL 3350 17 G: 17 POWDER, FOR SOLUTION ORAL at 08:51

## 2020-01-01 RX ADMIN — TIMOLOL MALEATE 1 DROP: 2.5 SOLUTION OPHTHALMIC at 08:09

## 2020-01-01 RX ADMIN — VANCOMYCIN HYDROCHLORIDE 1500 MG: 10 INJECTION, POWDER, LYOPHILIZED, FOR SOLUTION INTRAVENOUS at 08:50

## 2020-01-01 RX ADMIN — CEFEPIME HYDROCHLORIDE 2 G: 2 INJECTION, POWDER, FOR SOLUTION INTRAVENOUS at 16:28

## 2020-01-01 RX ADMIN — NOREPINEPHRINE BITARTRATE 12.8 MCG: 1 INJECTION INTRAVENOUS at 19:58

## 2020-01-01 RX ADMIN — CALCIUM CHLORIDE 1 G: 100 INJECTION INTRAVENOUS; INTRAVENTRICULAR at 02:55

## 2020-01-01 RX ADMIN — CEFTRIAXONE SODIUM 2 G: 2 INJECTION, POWDER, FOR SOLUTION INTRAMUSCULAR; INTRAVENOUS at 12:31

## 2020-01-01 RX ADMIN — PIPERACILLIN AND TAZOBACTAM 3.38 G: 3; .375 INJECTION, POWDER, FOR SOLUTION INTRAVENOUS at 17:35

## 2020-01-01 RX ADMIN — Medication 1 PACKET: at 16:50

## 2020-01-01 RX ADMIN — FENTANYL CITRATE 100 MCG: 50 INJECTION, SOLUTION INTRAMUSCULAR; INTRAVENOUS at 13:01

## 2020-01-01 RX ADMIN — PROPOFOL 20 MCG/KG/MIN: 10 INJECTION, EMULSION INTRAVENOUS at 20:00

## 2020-01-01 RX ADMIN — FUROSEMIDE 40 MG: 10 INJECTION, SOLUTION INTRAVENOUS at 10:10

## 2020-01-01 RX ADMIN — ROCURONIUM BROMIDE 50 MG: 10 INJECTION INTRAVENOUS at 15:56

## 2020-01-01 RX ADMIN — DEXMEDETOMIDINE 0.4 MCG/KG/HR: 100 INJECTION, SOLUTION, CONCENTRATE INTRAVENOUS at 17:35

## 2020-01-01 RX ADMIN — MIDAZOLAM 6 MG: 1 INJECTION INTRAMUSCULAR; INTRAVENOUS at 16:34

## 2020-01-01 RX ADMIN — LEVALBUTEROL HYDROCHLORIDE 1.25 MG: 1.25 SOLUTION RESPIRATORY (INHALATION) at 20:06

## 2020-01-01 RX ADMIN — VANCOMYCIN HYDROCHLORIDE 1500 MG: 10 INJECTION, POWDER, LYOPHILIZED, FOR SOLUTION INTRAVENOUS at 20:21

## 2020-01-01 RX ADMIN — EPINEPHRINE 0.05 MCG/KG/MIN: 1 INJECTION PARENTERAL at 08:03

## 2020-01-01 RX ADMIN — PHENYLEPHRINE HYDROCHLORIDE 0.8 MCG/KG/MIN: 10 INJECTION INTRAVENOUS at 12:47

## 2020-01-01 RX ADMIN — POTASSIUM CHLORIDE 20 MEQ: 29.8 INJECTION, SOLUTION INTRAVENOUS at 22:15

## 2020-01-01 RX ADMIN — ACETAZOLAMIDE SODIUM 1000 MG: 500 INJECTION, POWDER, LYOPHILIZED, FOR SOLUTION INTRAVENOUS at 12:34

## 2020-01-01 RX ADMIN — METHYLPREDNISOLONE SODIUM SUCCINATE 500 MG: 500 INJECTION, POWDER, FOR SOLUTION INTRAMUSCULAR; INTRAVENOUS at 14:56

## 2020-01-01 RX ADMIN — Medication 1 PACKET: at 14:38

## 2020-01-01 RX ADMIN — COAGULATION FACTOR VIIA (RECOMBINANT) 7 MG: KIT at 03:19

## 2020-01-01 RX ADMIN — ACETYLCYSTEINE 4 ML: 100 INHALANT RESPIRATORY (INHALATION) at 01:01

## 2020-01-01 RX ADMIN — COAGULATION FACTOR VIIA (RECOMBINANT) 4 MG: KIT at 20:52

## 2020-01-01 RX ADMIN — METRONIDAZOLE 500 MG: 500 INJECTION, SOLUTION INTRAVENOUS at 08:28

## 2020-01-01 RX ADMIN — ROCURONIUM BROMIDE 100 MG: 10 INJECTION INTRAVENOUS at 19:10

## 2020-01-01 RX ADMIN — MAGNESIUM SULFATE HEPTAHYDRATE 2 G: 40 INJECTION, SOLUTION INTRAVENOUS at 09:37

## 2020-01-01 RX ADMIN — VANCOMYCIN HYDROCHLORIDE 1500 MG: 10 INJECTION, POWDER, LYOPHILIZED, FOR SOLUTION INTRAVENOUS at 19:39

## 2020-01-01 RX ADMIN — PROPOFOL 30 MCG/KG/MIN: 10 INJECTION, EMULSION INTRAVENOUS at 10:47

## 2020-01-01 RX ADMIN — EPINEPHRINE 0.05 MCG/KG/MIN: 1 INJECTION PARENTERAL at 19:19

## 2020-01-01 RX ADMIN — ESMOLOL HYDROCHLORIDE IN SODIUM CHLORIDE 250 MCG/KG/MIN: 20 INJECTION INTRAVENOUS at 00:43

## 2020-01-01 RX ADMIN — PROPOFOL 20 MCG/KG/MIN: 10 INJECTION, EMULSION INTRAVENOUS at 07:32

## 2020-01-01 RX ADMIN — DEXMEDETOMIDINE 0.4 MCG/KG/HR: 100 INJECTION, SOLUTION, CONCENTRATE INTRAVENOUS at 07:19

## 2020-01-01 RX ADMIN — DEXMEDETOMIDINE 0.4 MCG/KG/HR: 100 INJECTION, SOLUTION, CONCENTRATE INTRAVENOUS at 06:24

## 2020-01-01 RX ADMIN — POTASSIUM & SODIUM PHOSPHATES POWDER PACK 280-160-250 MG 1 PACKET: 280-160-250 PACK at 19:50

## 2020-01-01 RX ADMIN — GABAPENTIN 100 MG: 100 CAPSULE ORAL at 15:10

## 2020-01-01 RX ADMIN — VANCOMYCIN HYDROCHLORIDE 1500 MG: 10 INJECTION, POWDER, LYOPHILIZED, FOR SOLUTION INTRAVENOUS at 10:28

## 2020-01-01 RX ADMIN — LATANOPROST 1 DROP: 50 SOLUTION OPHTHALMIC at 21:59

## 2020-01-01 RX ADMIN — POTASSIUM CHLORIDE 20 MEQ: 29.8 INJECTION, SOLUTION INTRAVENOUS at 22:37

## 2020-01-01 RX ADMIN — Medication 2 G: at 18:43

## 2020-01-01 RX ADMIN — EPOPROSTENOL 20 NG/KG/MIN: 1.5 INJECTION, POWDER, LYOPHILIZED, FOR SOLUTION INTRAVENOUS at 16:25

## 2020-01-01 RX ADMIN — Medication 1 PACKET: at 19:42

## 2020-01-01 RX ADMIN — FUROSEMIDE 10 MG: 20 INJECTION, SOLUTION INTRAMUSCULAR; INTRAVENOUS at 22:32

## 2020-01-01 RX ADMIN — MUPIROCIN 0.5 G: 20 OINTMENT TOPICAL at 20:01

## 2020-01-01 RX ADMIN — HEPARIN SODIUM 1380 UNITS/HR: 10000 INJECTION, SOLUTION INTRAVENOUS at 04:19

## 2020-01-01 RX ADMIN — Medication 1 PACKET: at 20:42

## 2020-01-01 RX ADMIN — POLYETHYLENE GLYCOL 3350 17 G: 17 POWDER, FOR SOLUTION ORAL at 10:36

## 2020-01-01 RX ADMIN — LATANOPROST 1 DROP: 50 SOLUTION OPHTHALMIC at 22:03

## 2020-01-01 RX ADMIN — ACETYLCYSTEINE 4 ML: 100 INHALANT RESPIRATORY (INHALATION) at 00:29

## 2020-01-01 RX ADMIN — FUROSEMIDE 40 MG: 10 INJECTION, SOLUTION INTRAVENOUS at 21:33

## 2020-01-01 RX ADMIN — PROPOFOL 20 MCG/KG/MIN: 10 INJECTION, EMULSION INTRAVENOUS at 02:33

## 2020-01-01 RX ADMIN — PROTAMINE SULFATE 50 MG: 10 INJECTION, SOLUTION INTRAVENOUS at 02:58

## 2020-01-01 RX ADMIN — PROPOFOL 30 MCG/KG/MIN: 10 INJECTION, EMULSION INTRAVENOUS at 02:05

## 2020-01-01 RX ADMIN — EPINEPHRINE 0.03 MCG/KG/MIN: 1 INJECTION PARENTERAL at 13:57

## 2020-01-01 RX ADMIN — PROPOFOL 30 MCG/KG/MIN: 10 INJECTION, EMULSION INTRAVENOUS at 09:06

## 2020-01-01 RX ADMIN — PROPOFOL 5 MCG/KG/MIN: 10 INJECTION, EMULSION INTRAVENOUS at 10:51

## 2020-01-01 RX ADMIN — PROPOFOL 30 MCG/KG/MIN: 10 INJECTION, EMULSION INTRAVENOUS at 21:59

## 2020-01-01 RX ADMIN — BUMETANIDE 2 MG: 0.25 INJECTION INTRAMUSCULAR; INTRAVENOUS at 21:34

## 2020-01-01 RX ADMIN — LEVALBUTEROL HYDROCHLORIDE 1.25 MG: 1.25 SOLUTION RESPIRATORY (INHALATION) at 11:25

## 2020-01-01 RX ADMIN — FUROSEMIDE 80 MG: 10 INJECTION, SOLUTION INTRAVENOUS at 09:24

## 2020-01-01 RX ADMIN — PROPOFOL 40 MG: 10 INJECTION, EMULSION INTRAVENOUS at 19:10

## 2020-01-01 RX ADMIN — ACETYLCYSTEINE 4 ML: 100 INHALANT RESPIRATORY (INHALATION) at 11:25

## 2020-01-01 RX ADMIN — CEFTRIAXONE SODIUM 2 G: 2 INJECTION, POWDER, FOR SOLUTION INTRAMUSCULAR; INTRAVENOUS at 11:48

## 2020-01-01 RX ADMIN — CEFEPIME HYDROCHLORIDE 2 G: 2 INJECTION, POWDER, FOR SOLUTION INTRAVENOUS at 08:28

## 2020-01-01 RX ADMIN — POTASSIUM CHLORIDE 20 MEQ: 29.8 INJECTION, SOLUTION INTRAVENOUS at 16:00

## 2020-01-01 RX ADMIN — SODIUM PHOSPHATE, MONOBASIC, MONOHYDRATE AND SODIUM PHOSPHATE, DIBASIC, ANHYDROUS 15 MMOL: 276; 142 INJECTION, SOLUTION INTRAVENOUS at 17:57

## 2020-01-01 RX ADMIN — VANCOMYCIN HYDROCHLORIDE 2000 MG: 10 INJECTION, POWDER, LYOPHILIZED, FOR SOLUTION INTRAVENOUS at 21:22

## 2020-01-01 RX ADMIN — PROPOFOL 20 MCG/KG/MIN: 10 INJECTION, EMULSION INTRAVENOUS at 17:29

## 2020-01-01 RX ADMIN — POTASSIUM CHLORIDE 20 MEQ: 29.8 INJECTION, SOLUTION INTRAVENOUS at 02:34

## 2020-01-01 RX ADMIN — ACETYLCYSTEINE 4 ML: 100 INHALANT RESPIRATORY (INHALATION) at 04:50

## 2020-01-01 RX ADMIN — ACETYLCYSTEINE 4 ML: 100 INHALANT RESPIRATORY (INHALATION) at 20:06

## 2020-01-01 RX ADMIN — Medication 1 PACKET: at 15:13

## 2020-01-01 RX ADMIN — TIMOLOL MALEATE 1 DROP: 2.5 SOLUTION OPHTHALMIC at 09:08

## 2020-01-01 RX ADMIN — POTASSIUM CHLORIDE 20 MEQ: 750 TABLET, EXTENDED RELEASE ORAL at 17:34

## 2020-01-01 RX ADMIN — PANTOPRAZOLE SODIUM 40 MG: 40 INJECTION, POWDER, FOR SOLUTION INTRAVENOUS at 08:24

## 2020-01-01 RX ADMIN — METRONIDAZOLE 500 MG: 500 INJECTION, SOLUTION INTRAVENOUS at 07:40

## 2020-01-01 RX ADMIN — SENNOSIDES AND DOCUSATE SODIUM 2 TABLET: 8.6; 5 TABLET ORAL at 20:20

## 2020-01-01 RX ADMIN — PROPOFOL 20 MCG/KG/MIN: 10 INJECTION, EMULSION INTRAVENOUS at 19:40

## 2020-01-01 RX ADMIN — Medication 25 MCG/HR: at 22:01

## 2020-01-01 RX ADMIN — EPINEPHRINE 0.05 MCG/KG/MIN: 1 INJECTION PARENTERAL at 07:19

## 2020-01-01 RX ADMIN — MULTIVITAMIN 15 ML: LIQUID ORAL at 13:51

## 2020-01-01 RX ADMIN — CALCIUM CHLORIDE 2000 MG: 100 INJECTION, SOLUTION INTRAVENOUS at 21:28

## 2020-01-01 RX ADMIN — NICARDIPINE HYDROCHLORIDE 5 MG/HR: 0.2 INJECTION, SOLUTION INTRAVENOUS at 14:00

## 2020-01-01 RX ADMIN — Medication 1 PACKET: at 07:42

## 2020-01-01 RX ADMIN — FENTANYL CITRATE 50 MCG: 50 INJECTION, SOLUTION INTRAMUSCULAR; INTRAVENOUS at 16:51

## 2020-01-01 RX ADMIN — PROPOFOL 20 MCG/KG/MIN: 10 INJECTION, EMULSION INTRAVENOUS at 02:59

## 2020-01-01 RX ADMIN — HUMAN ALBUMIN MICROSPHERES AND PERFLUTREN 9 ML: 10; .22 INJECTION, SOLUTION INTRAVENOUS at 14:51

## 2020-01-01 RX ADMIN — FENTANYL CITRATE 100 MCG: 50 INJECTION, SOLUTION INTRAMUSCULAR; INTRAVENOUS at 13:34

## 2020-01-01 RX ADMIN — PIPERACILLIN AND TAZOBACTAM 3.38 G: 3; .375 INJECTION, POWDER, FOR SOLUTION INTRAVENOUS at 01:02

## 2020-01-01 RX ADMIN — Medication 50 MCG/HR: at 02:55

## 2020-01-01 RX ADMIN — VANCOMYCIN HYDROCHLORIDE 1 G: 1 INJECTION, SOLUTION INTRAVENOUS at 13:50

## 2020-01-01 RX ADMIN — SODIUM PHOSPHATE, MONOBASIC, MONOHYDRATE AND SODIUM PHOSPHATE, DIBASIC, ANHYDROUS 10 MMOL: 276; 142 INJECTION, SOLUTION INTRAVENOUS at 09:03

## 2020-01-01 RX ADMIN — GABAPENTIN 100 MG: 100 CAPSULE ORAL at 20:07

## 2020-01-01 RX ADMIN — DEXMEDETOMIDINE 0.4 MCG/KG/HR: 100 INJECTION, SOLUTION, CONCENTRATE INTRAVENOUS at 08:09

## 2020-01-01 RX ADMIN — POTASSIUM CHLORIDE 20 MEQ: 1.5 POWDER, FOR SOLUTION ORAL at 05:56

## 2020-01-01 RX ADMIN — NOREPINEPHRINE BITARTRATE 0.03 MCG/KG/MIN: 1 INJECTION INTRAVENOUS at 17:49

## 2020-01-01 RX ADMIN — ACETAMINOPHEN 975 MG: 325 TABLET, FILM COATED ORAL at 16:46

## 2020-01-01 RX ADMIN — FENTANYL CITRATE 150 MCG: 50 INJECTION, SOLUTION INTRAMUSCULAR; INTRAVENOUS at 14:03

## 2020-01-01 RX ADMIN — PROPOFOL 20 MCG/KG/MIN: 10 INJECTION, EMULSION INTRAVENOUS at 03:02

## 2020-01-01 RX ADMIN — SODIUM PHOSPHATE, MONOBASIC, MONOHYDRATE AND SODIUM PHOSPHATE, DIBASIC, ANHYDROUS 20 MMOL: 276; 142 INJECTION, SOLUTION INTRAVENOUS at 22:17

## 2020-01-01 RX ADMIN — LEVALBUTEROL HYDROCHLORIDE 1.25 MG: 1.25 SOLUTION RESPIRATORY (INHALATION) at 00:29

## 2020-01-01 RX ADMIN — DEXMEDETOMIDINE 0.2 MCG/KG/HR: 100 INJECTION, SOLUTION, CONCENTRATE INTRAVENOUS at 13:11

## 2020-01-01 RX ADMIN — PHENYLEPHRINE HYDROCHLORIDE 0.7 MCG/KG/MIN: 10 INJECTION INTRAVENOUS at 21:15

## 2020-01-01 RX ADMIN — LEVALBUTEROL HYDROCHLORIDE 1.25 MG: 1.25 SOLUTION RESPIRATORY (INHALATION) at 17:08

## 2020-01-01 RX ADMIN — CALCIUM GLUCONATE 1 G: 98 INJECTION, SOLUTION INTRAVENOUS at 12:34

## 2020-01-01 RX ADMIN — PROPOFOL 15 MCG/KG/MIN: 10 INJECTION, EMULSION INTRAVENOUS at 09:11

## 2020-01-01 RX ADMIN — POTASSIUM & SODIUM PHOSPHATES POWDER PACK 280-160-250 MG 1 PACKET: 280-160-250 PACK at 08:39

## 2020-01-01 RX ADMIN — METRONIDAZOLE 500 MG: 500 INJECTION, SOLUTION INTRAVENOUS at 20:40

## 2020-01-01 RX ADMIN — SENNOSIDES AND DOCUSATE SODIUM 2 TABLET: 8.6; 5 TABLET ORAL at 07:40

## 2020-01-01 RX ADMIN — NOREPINEPHRINE BITARTRATE 6.4 MCG: 1 INJECTION INTRAVENOUS at 09:15

## 2020-01-01 RX ADMIN — PROPOFOL 30 MCG/KG/MIN: 10 INJECTION, EMULSION INTRAVENOUS at 03:19

## 2020-01-01 RX ADMIN — ROCURONIUM BROMIDE 50 MG: 10 INJECTION INTRAVENOUS at 13:33

## 2020-01-01 RX ADMIN — POTASSIUM & SODIUM PHOSPHATES POWDER PACK 280-160-250 MG 1 PACKET: 280-160-250 PACK at 19:36

## 2020-01-01 RX ADMIN — MULTIVITAMIN 15 ML: LIQUID ORAL at 10:06

## 2020-01-01 RX ADMIN — Medication 50 MCG/HR: at 07:20

## 2020-01-01 RX ADMIN — POTASSIUM CHLORIDE 20 MEQ: 29.8 INJECTION, SOLUTION INTRAVENOUS at 13:38

## 2020-01-01 RX ADMIN — SODIUM BICARBONATE 50 MEQ: 84 INJECTION, SOLUTION INTRAVENOUS at 21:17

## 2020-01-01 RX ADMIN — PROPOFOL 30 MCG/KG/MIN: 10 INJECTION, EMULSION INTRAVENOUS at 20:40

## 2020-01-01 RX ADMIN — SENNOSIDES AND DOCUSATE SODIUM 1 TABLET: 8.6; 5 TABLET ORAL at 19:47

## 2020-01-01 RX ADMIN — NICARDIPINE HYDROCHLORIDE 7.5 MG/HR: 0.2 INJECTION, SOLUTION INTRAVENOUS at 02:04

## 2020-01-01 RX ADMIN — LIDOCAINE HYDROCHLORIDE 6 ML: 10 INJECTION, SOLUTION EPIDURAL; INFILTRATION; INTRACAUDAL; PERINEURAL at 11:00

## 2020-01-01 RX ADMIN — POTASSIUM CHLORIDE 20 MEQ: 29.8 INJECTION, SOLUTION INTRAVENOUS at 23:25

## 2020-01-01 RX ADMIN — SODIUM BICARBONATE 100 MEQ: 84 INJECTION, SOLUTION INTRAVENOUS at 21:29

## 2020-01-01 RX ADMIN — SENNOSIDES AND DOCUSATE SODIUM 2 TABLET: 8.6; 5 TABLET ORAL at 23:05

## 2020-01-01 RX ADMIN — ACETYLCYSTEINE 4 ML: 100 INHALANT RESPIRATORY (INHALATION) at 17:08

## 2020-01-01 RX ADMIN — PROPOFOL 30 MCG/KG/MIN: 10 INJECTION, EMULSION INTRAVENOUS at 21:28

## 2020-01-01 RX ADMIN — EPOPROSTENOL 20 NG/KG/MIN: 1.5 INJECTION, POWDER, LYOPHILIZED, FOR SOLUTION INTRAVENOUS at 10:01

## 2020-01-01 RX ADMIN — POTASSIUM & SODIUM PHOSPHATES POWDER PACK 280-160-250 MG 1 PACKET: 280-160-250 PACK at 12:31

## 2020-01-01 RX ADMIN — VASOPRESSIN 2 UNITS/HR: 20 INJECTION INTRAVENOUS at 02:15

## 2020-01-01 RX ADMIN — MIDAZOLAM 1 MG: 1 INJECTION INTRAMUSCULAR; INTRAVENOUS at 16:48

## 2020-01-01 RX ADMIN — ACETAMINOPHEN 975 MG: 325 TABLET, FILM COATED ORAL at 00:31

## 2020-01-01 RX ADMIN — PROTAMINE SULFATE 100 MG: 10 INJECTION, SOLUTION INTRAVENOUS at 22:40

## 2020-01-01 RX ADMIN — METRONIDAZOLE 500 MG: 500 INJECTION, SOLUTION INTRAVENOUS at 14:52

## 2020-01-01 RX ADMIN — SODIUM PHOSPHATE, MONOBASIC, MONOHYDRATE AND SODIUM PHOSPHATE, DIBASIC, ANHYDROUS 15 MMOL: 276; 142 INJECTION, SOLUTION INTRAVENOUS at 06:20

## 2020-01-01 RX ADMIN — POTASSIUM CHLORIDE 20 MEQ: 29.8 INJECTION, SOLUTION INTRAVENOUS at 16:46

## 2020-01-01 RX ADMIN — SODIUM BICARBONATE 50 MEQ: 84 INJECTION, SOLUTION INTRAVENOUS at 13:29

## 2020-01-01 RX ADMIN — CALCIUM GLUCONATE 1 G: 98 INJECTION, SOLUTION INTRAVENOUS at 19:16

## 2020-01-01 RX ADMIN — SENNOSIDES AND DOCUSATE SODIUM 2 TABLET: 8.6; 5 TABLET ORAL at 08:24

## 2020-01-01 RX ADMIN — POTASSIUM CHLORIDE 20 MEQ: 29.8 INJECTION, SOLUTION INTRAVENOUS at 14:48

## 2020-01-01 RX ADMIN — POTASSIUM CHLORIDE 20 MEQ: 1.5 POWDER, FOR SOLUTION ORAL at 10:38

## 2020-01-01 RX ADMIN — VASOPRESSIN 2 UNITS/HR: 20 INJECTION INTRAVENOUS at 18:12

## 2020-01-01 RX ADMIN — PROPOFOL 30 MG: 10 INJECTION, EMULSION INTRAVENOUS at 12:16

## 2020-01-01 RX ADMIN — Medication 50 MCG/HR: at 03:44

## 2020-01-01 RX ADMIN — PROTAMINE SULFATE 250 MG: 10 INJECTION, SOLUTION INTRAVENOUS at 19:58

## 2020-01-01 RX ADMIN — ACETYLCYSTEINE 4 ML: 100 INHALANT RESPIRATORY (INHALATION) at 11:30

## 2020-01-01 RX ADMIN — POTASSIUM CHLORIDE 20 MEQ: 1.5 POWDER, FOR SOLUTION ORAL at 01:09

## 2020-01-01 RX ADMIN — FENTANYL CITRATE 100 MCG: 50 INJECTION, SOLUTION INTRAMUSCULAR; INTRAVENOUS at 21:02

## 2020-01-01 RX ADMIN — EPOPROSTENOL 20 NG/KG/MIN: 1.5 INJECTION, POWDER, LYOPHILIZED, FOR SOLUTION INTRAVENOUS at 13:10

## 2020-01-01 RX ADMIN — DEXTROSE AND SODIUM CHLORIDE: 5; 450 INJECTION, SOLUTION INTRAVENOUS at 21:48

## 2020-01-01 RX ADMIN — SENNOSIDES AND DOCUSATE SODIUM 2 TABLET: 8.6; 5 TABLET ORAL at 19:36

## 2020-01-01 RX ADMIN — LEVALBUTEROL HYDROCHLORIDE 1.25 MG: 1.25 SOLUTION RESPIRATORY (INHALATION) at 08:07

## 2020-01-01 RX ADMIN — METRONIDAZOLE 500 MG: 500 INJECTION, SOLUTION INTRAVENOUS at 13:59

## 2020-01-01 RX ADMIN — SENNOSIDES AND DOCUSATE SODIUM 1 TABLET: 8.6; 5 TABLET ORAL at 20:43

## 2020-01-01 RX ADMIN — NOREPINEPHRINE BITARTRATE 6.4 MCG: 1 INJECTION INTRAVENOUS at 14:40

## 2020-01-01 RX ADMIN — ROCURONIUM BROMIDE 50 MG: 10 INJECTION INTRAVENOUS at 12:18

## 2020-01-01 RX ADMIN — HEPARIN SODIUM 31000 UNITS: 1000 INJECTION, SOLUTION INTRAVENOUS; SUBCUTANEOUS at 14:37

## 2020-01-01 RX ADMIN — TIMOLOL MALEATE 1 DROP: 2.5 SOLUTION OPHTHALMIC at 09:09

## 2020-01-01 RX ADMIN — PROTAMINE SULFATE 50 MG: 10 INJECTION, SOLUTION INTRAVENOUS at 23:24

## 2020-01-01 RX ADMIN — FUROSEMIDE 20 MG: 10 INJECTION, SOLUTION INTRAVENOUS at 10:19

## 2020-01-01 RX ADMIN — METRONIDAZOLE 500 MG: 500 INJECTION, SOLUTION INTRAVENOUS at 15:11

## 2020-01-01 RX ADMIN — PROPOFOL 15 MCG/KG/MIN: 10 INJECTION, EMULSION INTRAVENOUS at 00:31

## 2020-01-01 RX ADMIN — Medication 2 G: at 04:46

## 2020-01-01 RX ADMIN — COAGULATION FACTOR VIIA (RECOMBINANT) 2 MG: KIT at 20:55

## 2020-01-01 RX ADMIN — SODIUM CHLORIDE, SODIUM GLUCONATE, SODIUM ACETATE, POTASSIUM CHLORIDE AND MAGNESIUM CHLORIDE: 526; 502; 368; 37; 30 INJECTION, SOLUTION INTRAVENOUS at 08:50

## 2020-01-01 RX ADMIN — POTASSIUM & SODIUM PHOSPHATES POWDER PACK 280-160-250 MG 1 PACKET: 280-160-250 PACK at 08:07

## 2020-01-01 RX ADMIN — ACETYLCYSTEINE 4 ML: 100 INHALANT RESPIRATORY (INHALATION) at 08:07

## 2020-01-01 RX ADMIN — ROCURONIUM BROMIDE 50 MG: 10 INJECTION INTRAVENOUS at 19:13

## 2020-01-01 RX ADMIN — LATANOPROST 1 DROP: 50 SOLUTION OPHTHALMIC at 21:16

## 2020-01-01 RX ADMIN — PIPERACILLIN AND TAZOBACTAM 3.38 G: 3; .375 INJECTION, POWDER, FOR SOLUTION INTRAVENOUS at 05:50

## 2020-01-01 RX ADMIN — SODIUM BICARBONATE 50 MEQ: 84 INJECTION, SOLUTION INTRAVENOUS at 13:43

## 2020-01-01 RX ADMIN — ESMOLOL HYDROCHLORIDE IN SODIUM CHLORIDE 50 MCG/KG/MIN: 20 INJECTION INTRAVENOUS at 11:09

## 2020-01-01 RX ADMIN — GABAPENTIN 100 MG: 250 SOLUTION ORAL at 20:42

## 2020-01-01 RX ADMIN — PIPERACILLIN AND TAZOBACTAM 3.38 G: 3; .375 INJECTION, POWDER, FOR SOLUTION INTRAVENOUS at 12:14

## 2020-01-01 RX ADMIN — ESMOLOL HYDROCHLORIDE IN SODIUM CHLORIDE 150 MCG/KG/MIN: 20 INJECTION INTRAVENOUS at 13:52

## 2020-01-01 RX ADMIN — LEVALBUTEROL HYDROCHLORIDE 1.25 MG: 1.25 SOLUTION RESPIRATORY (INHALATION) at 08:20

## 2020-01-01 RX ADMIN — GABAPENTIN 100 MG: 100 CAPSULE ORAL at 13:53

## 2020-01-01 RX ADMIN — Medication 0.05 MCG/KG/MIN: at 12:22

## 2020-01-01 RX ADMIN — ROCURONIUM BROMIDE 100 MG: 10 INJECTION INTRAVENOUS at 09:15

## 2020-01-01 RX ADMIN — FENTANYL CITRATE 100 MCG: 50 INJECTION, SOLUTION INTRAMUSCULAR; INTRAVENOUS at 11:03

## 2020-01-01 RX ADMIN — ACETAMINOPHEN 975 MG: 325 TABLET, FILM COATED ORAL at 07:40

## 2020-01-01 RX ADMIN — HEPARIN SODIUM 1080 UNITS/HR: 10000 INJECTION, SOLUTION INTRAVENOUS at 14:05

## 2020-01-01 RX ADMIN — LIDOCAINE HYDROCHLORIDE 15 ML: 20 SOLUTION ORAL; TOPICAL at 14:48

## 2020-01-01 RX ADMIN — LATANOPROST 1 DROP: 50 SOLUTION OPHTHALMIC at 21:18

## 2020-01-01 RX ADMIN — PROPOFOL 40 MG: 10 INJECTION, EMULSION INTRAVENOUS at 12:32

## 2020-01-01 RX ADMIN — METRONIDAZOLE 500 MG: 500 INJECTION, SOLUTION INTRAVENOUS at 14:38

## 2020-01-01 RX ADMIN — ROCURONIUM BROMIDE 50 MG: 10 INJECTION INTRAVENOUS at 09:55

## 2020-01-01 RX ADMIN — MULTIVITAMIN 15 ML: LIQUID ORAL at 08:51

## 2020-01-01 RX ADMIN — MIDAZOLAM 5 MG: 1 INJECTION INTRAMUSCULAR; INTRAVENOUS at 12:50

## 2020-01-01 RX ADMIN — HEPARIN SODIUM 1080 UNITS/HR: 10000 INJECTION, SOLUTION INTRAVENOUS at 14:33

## 2020-01-01 RX ADMIN — LEVALBUTEROL HYDROCHLORIDE 1.25 MG: 1.25 SOLUTION RESPIRATORY (INHALATION) at 11:30

## 2020-01-01 RX ADMIN — POTASSIUM CHLORIDE 20 MEQ: 29.8 INJECTION, SOLUTION INTRAVENOUS at 05:17

## 2020-01-01 RX ADMIN — CALCIUM CHLORIDE 2000 MG: 100 INJECTION, SOLUTION INTRAVENOUS at 21:13

## 2020-01-01 RX ADMIN — CALCIUM CHLORIDE 1000 MG: 100 INJECTION, SOLUTION INTRAVENOUS at 21:03

## 2020-01-01 RX ADMIN — DEXMEDETOMIDINE 0.4 MCG/KG/HR: 100 INJECTION, SOLUTION, CONCENTRATE INTRAVENOUS at 02:55

## 2020-01-01 RX ADMIN — MULTIVITAMIN 15 ML: LIQUID ORAL at 08:07

## 2020-01-01 RX ADMIN — LEVALBUTEROL HYDROCHLORIDE 1.25 MG: 1.25 SOLUTION RESPIRATORY (INHALATION) at 04:49

## 2020-01-01 RX ADMIN — LEVALBUTEROL HYDROCHLORIDE 1.25 MG: 1.25 SOLUTION RESPIRATORY (INHALATION) at 04:50

## 2020-01-01 RX ADMIN — ACETYLCYSTEINE 4 ML: 100 INHALANT RESPIRATORY (INHALATION) at 19:53

## 2020-01-01 RX ADMIN — FENTANYL CITRATE 250 MCG: 50 INJECTION, SOLUTION INTRAMUSCULAR; INTRAVENOUS at 09:24

## 2020-01-01 RX ADMIN — LEVALBUTEROL HYDROCHLORIDE 1.25 MG: 1.25 SOLUTION RESPIRATORY (INHALATION) at 01:01

## 2020-01-01 RX ADMIN — FUROSEMIDE 10 MG: 10 INJECTION, SOLUTION INTRAVENOUS at 14:26

## 2020-01-01 RX ADMIN — LEVALBUTEROL HYDROCHLORIDE 1.25 MG: 1.25 SOLUTION RESPIRATORY (INHALATION) at 19:53

## 2020-01-01 RX ADMIN — PROPOFOL 30 MCG/KG/MIN: 10 INJECTION, EMULSION INTRAVENOUS at 15:00

## 2020-01-01 RX ADMIN — PIPERACILLIN AND TAZOBACTAM 3.38 G: 3; .375 INJECTION, POWDER, FOR SOLUTION INTRAVENOUS at 12:23

## 2020-01-01 RX ADMIN — PANTOPRAZOLE SODIUM 40 MG: 40 INJECTION, POWDER, FOR SOLUTION INTRAVENOUS at 08:07

## 2020-01-01 RX ADMIN — SODIUM PHOSPHATE, MONOBASIC, MONOHYDRATE AND SODIUM PHOSPHATE, DIBASIC, ANHYDROUS 15 MMOL: 276; 142 INJECTION, SOLUTION INTRAVENOUS at 20:43

## 2020-01-01 RX ADMIN — HEPARIN SODIUM 1380 UNITS/HR: 10000 INJECTION, SOLUTION INTRAVENOUS at 16:22

## 2020-01-01 RX ADMIN — NICARDIPINE HYDROCHLORIDE 2.5 MG/HR: 0.2 INJECTION, SOLUTION INTRAVENOUS at 12:46

## 2020-01-01 RX ADMIN — FENTANYL CITRATE 100 MCG: 50 INJECTION, SOLUTION INTRAMUSCULAR; INTRAVENOUS at 22:53

## 2020-01-01 RX ADMIN — PANTOPRAZOLE SODIUM 40 MG: 40 INJECTION, POWDER, FOR SOLUTION INTRAVENOUS at 08:52

## 2020-01-01 RX ADMIN — ACETAMINOPHEN 975 MG: 325 TABLET, FILM COATED ORAL at 22:01

## 2020-01-01 RX ADMIN — PANTOPRAZOLE SODIUM 40 MG: 40 INJECTION, POWDER, FOR SOLUTION INTRAVENOUS at 09:10

## 2020-01-01 RX ADMIN — TIMOLOL MALEATE 1 DROP: 2.5 SOLUTION OPHTHALMIC at 09:00

## 2020-01-01 RX ADMIN — DEXTROSE AND SODIUM CHLORIDE 1000 ML: 5; 200 INJECTION, SOLUTION INTRAVENOUS at 14:02

## 2020-01-01 RX ADMIN — POTASSIUM & SODIUM PHOSPHATES POWDER PACK 280-160-250 MG 1 PACKET: 280-160-250 PACK at 16:19

## 2020-01-01 RX ADMIN — ROCURONIUM BROMIDE 50 MG: 10 INJECTION INTRAVENOUS at 10:46

## 2020-01-01 RX ADMIN — VANCOMYCIN HYDROCHLORIDE 1500 MG: 1 INJECTION, POWDER, LYOPHILIZED, FOR SOLUTION INTRAVENOUS at 13:59

## 2020-01-01 RX ADMIN — AMIODARONE HYDROCHLORIDE 150 MG: 1.5 INJECTION, SOLUTION INTRAVENOUS at 21:09

## 2020-01-01 RX ADMIN — ACETAMINOPHEN 975 MG: 325 TABLET, FILM COATED ORAL at 22:06

## 2020-01-01 RX ADMIN — POTASSIUM CHLORIDE 20 MEQ: 1.5 POWDER, FOR SOLUTION ORAL at 05:50

## 2020-01-01 RX ADMIN — PIPERACILLIN AND TAZOBACTAM 3.38 G: 3; .375 INJECTION, POWDER, FOR SOLUTION INTRAVENOUS at 18:09

## 2020-01-01 RX ADMIN — ACETAMINOPHEN 975 MG: 325 TABLET, FILM COATED ORAL at 13:53

## 2020-01-01 RX ADMIN — PROPOFOL 30 MCG/KG/MIN: 10 INJECTION, EMULSION INTRAVENOUS at 16:22

## 2020-01-01 RX ADMIN — CEFEPIME HYDROCHLORIDE 2 G: 2 INJECTION, POWDER, FOR SOLUTION INTRAVENOUS at 23:48

## 2020-01-01 RX ADMIN — POTASSIUM & SODIUM PHOSPHATES POWDER PACK 280-160-250 MG 1 PACKET: 280-160-250 PACK at 16:22

## 2020-01-01 RX ADMIN — EPOPROSTENOL 20 NG/KG/MIN: 1.5 INJECTION, POWDER, LYOPHILIZED, FOR SOLUTION INTRAVENOUS at 09:38

## 2020-01-01 RX ADMIN — EPOPROSTENOL 20 NG/KG/MIN: 1.5 INJECTION, POWDER, LYOPHILIZED, FOR SOLUTION INTRAVENOUS at 02:59

## 2020-01-01 RX ADMIN — AMINOCAPROIC ACID 7.5 G/HR: 250 INJECTION, SOLUTION INTRAVENOUS at 14:20

## 2020-01-01 RX ADMIN — CALCIUM CHLORIDE 1000 MG: 100 INJECTION, SOLUTION INTRAVENOUS at 20:57

## 2020-01-01 RX ADMIN — SODIUM CHLORIDE, SODIUM GLUCONATE, SODIUM ACETATE, POTASSIUM CHLORIDE AND MAGNESIUM CHLORIDE: 526; 502; 368; 37; 30 INJECTION, SOLUTION INTRAVENOUS at 12:18

## 2020-01-01 RX ADMIN — SODIUM BICARBONATE 50 MEQ: 84 INJECTION, SOLUTION INTRAVENOUS at 21:16

## 2020-01-01 RX ADMIN — METRONIDAZOLE 500 MG: 500 INJECTION, SOLUTION INTRAVENOUS at 03:07

## 2020-01-01 RX ADMIN — PANTOPRAZOLE SODIUM 40 MG: 40 INJECTION, POWDER, FOR SOLUTION INTRAVENOUS at 08:26

## 2020-01-01 RX ADMIN — PANTOPRAZOLE SODIUM 40 MG: 40 INJECTION, POWDER, FOR SOLUTION INTRAVENOUS at 08:39

## 2020-01-01 RX ADMIN — SENNOSIDES AND DOCUSATE SODIUM 1 TABLET: 8.6; 5 TABLET ORAL at 08:53

## 2020-01-01 RX ADMIN — HEPARIN SODIUM 1380 UNITS/HR: 10000 INJECTION, SOLUTION INTRAVENOUS at 20:40

## 2020-01-01 RX ADMIN — EPINEPHRINE 0.05 MCG/KG/MIN: 1 INJECTION PARENTERAL at 19:40

## 2020-01-01 RX ADMIN — PIPERACILLIN AND TAZOBACTAM 3.38 G: 3; .375 INJECTION, POWDER, FOR SOLUTION INTRAVENOUS at 05:32

## 2020-01-01 RX ADMIN — PANTOPRAZOLE SODIUM 40 MG: 40 INJECTION, POWDER, FOR SOLUTION INTRAVENOUS at 07:40

## 2020-01-01 RX ADMIN — GABAPENTIN 100 MG: 250 SOLUTION ORAL at 16:47

## 2020-01-01 RX ADMIN — GABAPENTIN 100 MG: 250 SOLUTION ORAL at 07:42

## 2020-01-01 RX ADMIN — HEPARIN SODIUM 30000 UNITS: 1000 INJECTION, SOLUTION INTRAVENOUS; SUBCUTANEOUS at 20:08

## 2020-01-01 RX ADMIN — DEXMEDETOMIDINE 0.4 MCG/KG/HR: 100 INJECTION, SOLUTION, CONCENTRATE INTRAVENOUS at 16:44

## 2020-01-01 RX ADMIN — SENNOSIDES AND DOCUSATE SODIUM 2 TABLET: 8.6; 5 TABLET ORAL at 08:39

## 2020-01-01 RX ADMIN — METOPROLOL TARTRATE 5 MG: 1 INJECTION, SOLUTION INTRAVENOUS at 14:02

## 2020-01-01 RX ADMIN — POTASSIUM CHLORIDE 20 MEQ: 29.8 INJECTION, SOLUTION INTRAVENOUS at 14:38

## 2020-01-01 RX ADMIN — GABAPENTIN 100 MG: 100 CAPSULE ORAL at 08:24

## 2020-01-01 RX ADMIN — METOPROLOL TARTRATE 25 MG: 25 TABLET ORAL at 19:48

## 2020-01-01 RX ADMIN — SENNOSIDES AND DOCUSATE SODIUM 2 TABLET: 8.6; 5 TABLET ORAL at 08:59

## 2020-01-01 RX ADMIN — LEVALBUTEROL HYDROCHLORIDE 1.25 MG: 1.25 SOLUTION RESPIRATORY (INHALATION) at 16:30

## 2020-01-01 RX ADMIN — EPOPROSTENOL 20 NG/KG/MIN: 1.5 INJECTION, POWDER, LYOPHILIZED, FOR SOLUTION INTRAVENOUS at 21:46

## 2020-01-01 RX ADMIN — VANCOMYCIN HYDROCHLORIDE 1 G: 1 INJECTION, SOLUTION INTRAVENOUS at 21:50

## 2020-01-01 RX ADMIN — METOPROLOL TARTRATE 25 MG: 25 TABLET ORAL at 12:31

## 2020-01-01 RX ADMIN — DEXMEDETOMIDINE 0.4 MCG/KG/HR: 100 INJECTION, SOLUTION, CONCENTRATE INTRAVENOUS at 06:17

## 2020-01-01 RX ADMIN — SODIUM BICARBONATE 50 MEQ: 84 INJECTION, SOLUTION INTRAVENOUS at 21:46

## 2020-01-01 RX ADMIN — BUMETANIDE 2 MG: 0.25 INJECTION INTRAMUSCULAR; INTRAVENOUS at 04:52

## 2020-01-01 RX ADMIN — EPINEPHRINE 0.04 MCG/KG/MIN: 1 INJECTION PARENTERAL at 12:22

## 2020-01-01 RX ADMIN — PHENYLEPHRINE HYDROCHLORIDE 0.4 MCG/KG/MIN: 10 INJECTION INTRAVENOUS at 22:26

## 2020-01-01 RX ADMIN — HUMAN INSULIN 2 UNITS/HR: 100 INJECTION, SOLUTION SUBCUTANEOUS at 18:40

## 2020-01-01 RX ADMIN — DEXMEDETOMIDINE 0.2 MCG/KG/HR: 100 INJECTION, SOLUTION, CONCENTRATE INTRAVENOUS at 17:49

## 2020-01-01 RX ADMIN — CALCIUM GLUCONATE 1 G: 98 INJECTION, SOLUTION INTRAVENOUS at 13:58

## 2020-01-01 RX ADMIN — POTASSIUM CHLORIDE 20 MEQ: 29.8 INJECTION, SOLUTION INTRAVENOUS at 05:09

## 2020-01-01 RX ADMIN — SODIUM CHLORIDE, POTASSIUM CHLORIDE, SODIUM LACTATE AND CALCIUM CHLORIDE 500 ML: 600; 310; 30; 20 INJECTION, SOLUTION INTRAVENOUS at 18:40

## 2020-01-01 RX ADMIN — MULTIVITAMIN 15 ML: LIQUID ORAL at 07:39

## 2020-01-01 RX ADMIN — SENNOSIDES AND DOCUSATE SODIUM 2 TABLET: 8.6; 5 TABLET ORAL at 08:52

## 2020-01-01 RX ADMIN — AMIODARONE HYDROCHLORIDE 1 MG/MIN: 50 INJECTION, SOLUTION INTRAVENOUS at 21:24

## 2020-01-01 ASSESSMENT — MIFFLIN-ST. JEOR
SCORE: 1689.25
SCORE: 1589.25
SCORE: 1667.25
SCORE: 1582.25
SCORE: 1578.25
SCORE: 1749.25
SCORE: 1582.25
SCORE: 1570.25

## 2020-01-01 ASSESSMENT — LIFESTYLE VARIABLES
TOBACCO_USE: 1
TOBACCO_USE: 1

## 2020-01-01 ASSESSMENT — ACTIVITIES OF DAILY LIVING (ADL)
ADLS_ACUITY_SCORE: 26
ADLS_ACUITY_SCORE: 26
ADLS_ACUITY_SCORE: 23
ADLS_ACUITY_SCORE: 26
ADLS_ACUITY_SCORE: 26
ADLS_ACUITY_SCORE: 24
ADLS_ACUITY_SCORE: 26
ADLS_ACUITY_SCORE: 18
ADLS_ACUITY_SCORE: 26
ADLS_ACUITY_SCORE: 22
ADLS_ACUITY_SCORE: 26
ADLS_ACUITY_SCORE: 26
ADLS_ACUITY_SCORE: 22
ADLS_ACUITY_SCORE: 26
ADLS_ACUITY_SCORE: 28
ADLS_ACUITY_SCORE: 26
ADLS_ACUITY_SCORE: 22
ADLS_ACUITY_SCORE: 22
ADLS_ACUITY_SCORE: 26
ADLS_ACUITY_SCORE: 24
ADLS_ACUITY_SCORE: 26
ADLS_ACUITY_SCORE: 26
ADLS_ACUITY_SCORE: 24
ADLS_ACUITY_SCORE: 26
ADLS_ACUITY_SCORE: 26
ADLS_ACUITY_SCORE: 23
ADLS_ACUITY_SCORE: 23
ADLS_ACUITY_SCORE: 26
ADLS_ACUITY_SCORE: 23
ADLS_ACUITY_SCORE: 22
ADLS_ACUITY_SCORE: 26
ADLS_ACUITY_SCORE: 22
ADLS_ACUITY_SCORE: 24
ADLS_ACUITY_SCORE: 26
ADLS_ACUITY_SCORE: 22

## 2020-01-01 ASSESSMENT — ENCOUNTER SYMPTOMS
DYSRHYTHMIAS: 1
DYSRHYTHMIAS: 1

## 2020-01-17 PROBLEM — I71.00 AORTIC DISSECTION (H): Status: ACTIVE | Noted: 2020-01-01

## 2020-01-17 NOTE — H&P
CV ICU ADMISSION NOTE  01/17/2020      PRIMARY TEAM: CVTS   PRIMARY PHYSICIAN: Dr. Gutierrez  REASON FOR CRITICAL CARE ADMISSION: Hemodynamic instability/aortic dissection.   ADMITTING PHYSICIAN: Dr. Cameron   Date of Service (when I saw the patient): 01/17/2020    ASSESSMENT:  Enmanuel Hernandez is a 74 year old male who was admitted for aortic dissection. Patient transferred from [place] 1/16/20 and then transferred to [facility] in [place]. Air lifted to [facility] 1/17/20 where patient was found to be a prohibitively high risk of operation given his history of AVR. Will medically manage and optimize to reassess risk of surgery.     PLAN:  -Keep HR <60 and SBP <100  -Titrate drips as able.   -Q6hr labs  -Will need central line  -Discuss code status with wife, Patient does not want Chest compressions but ok with having an ETT.     Neurological:  # Intubated requiring sedation  - Monitor neurological status. Delirium preventions and precautions.   - Pain: fent gtt.     - Sedation plan: Prop gtt.     Pulmonary:   # Mechanical ventilation   - VENT: Continue full vent support. PST when meets criteria.    - Ventilatory bundle. HOB elevation.  - Incentive spirometer every 15- 30 minutes when extubated    Cardiovascular:    #History of AVR  #Ascending aortic dissection  - Monitor hemodynamic status.   - Wean pressors as able  - Monitor pulses in all 4 extremities.   - SBP <100  - HR <60  - Esmolol gtt  - Phenylephrine gtt.     Gastroenterology/Nutrition:  - NPO for now  - ADAT after extubation   - Trend lactic acid.     Electrolytes/Renal:  - Urine output is adequate. Will continue to monitor intake and output.  - ayala catheter.  - Trend BMP     Endocrine:  # Hyperglycemia  - Insulin gtt.   - Goal to keep BG< 180 for optimal wound healing     ID:  - Started broad spectrum.   - Sent cultures.     Heme:     -Transfuse if hgb <7.0 or signs/symptoms of hypoperfusion.   -T&S valid.       Skin:  - No skin issues  - Diligent skin  cares    General Cares/Prophylaxis:    DVT Prophylaxis: Heparin gtt  GI Prophylaxis: PPI  Restraints: Restraints for medical healing needed: NO    Lines/ tubes/ drains:  - ETT, PIV, A.line, ayala, RIJ CVC.     Disposition:  - CV ICU.     Patient seen, findings and plan discussed with CV ICU staff, Dr. Cameron.    Marcel Goff  - - - - - - - - - - - - - - - - - - - - - - - - - - - - - - - - - - - - - - - - - - - - - -   HISTORY PRESENTING ILLNESS:   74 year old male who was admitted for aortic dissection. Patient transferred from [place] 1/16/20 and then transferred to [facility] in [place]. Air lifted to [facility] 1/17/20 where patient was found to be a prohibitively high risk of operation given his history of AVR. Will medically manage and optimize to reassess risk of surgery.     REVIEW OF SYSTEMS: Unable to obtain ROS due to intubation/sedation.     PAST MEDICAL HISTORY:   No past medical history on file.    SURGICAL HISTORY:   No past surgical history on file.    SOCIAL HISTORY:   Social History     Socioeconomic History     Marital status: Not on file     Spouse name: Not on file     Number of children: Not on file     Years of education: Not on file     Highest education level: Not on file   Occupational History     Not on file   Social Needs     Financial resource strain: Not on file     Food insecurity:     Worry: Not on file     Inability: Not on file     Transportation needs:     Medical: Not on file     Non-medical: Not on file   Tobacco Use     Smoking status: Not on file   Substance and Sexual Activity     Alcohol use: Not on file     Drug use: Not on file     Sexual activity: Not on file   Lifestyle     Physical activity:     Days per week: Not on file     Minutes per session: Not on file     Stress: Not on file   Relationships     Social connections:     Talks on phone: Not on file     Gets together: Not on file     Attends [employer] service: Not on file     Active member of club or organization: Not on  file     Attends meetings of clubs or organizations: Not on file     Relationship status: Not on file     Intimate partner violence:     Fear of current or ex partner: Not on file     Emotionally abused: Not on file     Physically abused: Not on file     Forced sexual activity: Not on file   Other Topics Concern     Not on file   Social History Narrative     Not on file     FAMILY HISTORY: No bleeding/clotting disorders nor problems with anesthesia.     ALLERGIES:   Not on File    MEDICATIONS:  No current facility-administered medications on file prior to encounter.   latanoprost (XALATAN) 0.005 % ophthalmic solution, Place 1 drop into the right eye At Bedtime  sildenafil (VIAGRA) 50 MG tablet, Take 50 mg by mouth daily as needed  timolol maleate (TIMOPTIC) 0.25 % ophthalmic solution, Place 1 drop into the right eye every morning  warfarin ANTICOAGULANT (COUMADIN) 4 MG tablet, Take 2.5 tablets (10 mg) by mouth every Wednesday evening. Take 2 tablets (8 mg) by mouth all other days in the evening.        PHYSICAL EXAMINATION:  Pulse:  [55-69] 55  Heart Rate:  [56-71] 59  Resp:  [14-24] 21  BP: ()/(47-79) 95/79  MAP:  [52 mmHg-290 mmHg] 95 mmHg  Arterial Line BP: ()/() 123/76  SpO2:  [92 %-99 %] 97 %  General: intubated, sedated, in no acute distress  HEENT: moist mucous membranes   Neuro: sedated currently, does not follow commands  Pulm/Resp: mechanically ventilated, breathing non-labored  CV: regular rate, regular rhythm  Abdomen: Soft, non-distended  : ayala catheter in place, urine yellow and clear  Incisions/Skin: dressings clean, dry, intact  MSK/Extremities: peripheral pulses intact, extremities well perfused    LABS: Reviewed.   Arterial Blood Gases   Recent Labs   Lab 01/17/20  1234   PH 7.30*   PCO2 30*   PO2 102   HCO3 15*     Complete Blood Count   Recent Labs   Lab 01/17/20  1226   WBC 19.3*   HGB 19.7*        Basic Metabolic Panel  Recent Labs   Lab 01/17/20  1226       POTASSIUM 4.3   CHLORIDE 117*   CO2 15*   BUN 25   CR 1.35*   GLC 97     Liver Function Tests  Recent Labs   Lab 01/17/20  1226   *   *   ALKPHOS 48   BILITOTAL 2.2*   ALBUMIN 3.7   INR 1.45*     Pancreatic Enzymes  No lab results found in last 7 days.  Coagulation Profile  Recent Labs   Lab 01/17/20  1226   INR 1.45*   PTT 39*       IMAGING:  No results found for this or any previous visit (from the past 24 hour(s)).

## 2020-01-17 NOTE — PROGRESS NOTES
Pt arrived to ED via air flight intubated with a size 7.5ETT secured 24cm @ the lip. Pt was placed on CMV 12 500 +8 60%. Pt also arrived with a arterial line in his left radial artery, transducer and pressure bag was replaced per hospital compatibility.     1/17/2020  Annmarie Worthy RT

## 2020-01-17 NOTE — ED NOTES
Turned pt to R side for echo and pt started coughing. Suctioning completed as needed. Sedation adjusted per orders. Pt was able to calm down without sedation bolus when rolled to back.

## 2020-01-17 NOTE — ANESTHESIA PREPROCEDURE EVALUATION
Anesthesia Pre-Procedure Evaluation    Patient: Enmanuel Hernandez   MRN:     [id number] Gender:   male   Age:    74 year old :      1945        Preoperative Diagnosis: Aortic dissection (H) [I71.00]   Procedure(s):  REPAIR, ABDOMINAL AORTIC ANEURYSM, WITH INTRAOPERATIVE BLOOD SALVAGE     No past medical history on file.   No past surgical history on file.       Anesthesia Evaluation     .             ROS/MED HX    ENT/Pulmonary: Comment: Hemophilus influenza and strep pneumonia     (+)tobacco use, Current use less than 1 pack  packs/day  , . .    Neurologic:       Cardiovascular: Comment: Ascending aortic dissection     (+) ----. : . . . :. dysrhythmias a-flutter, . Previous cardiac testing Echodate:20results:Aortic dissection with flap visualized in the proximal arch with severely  dilated ascending aorta measuring 5.7 cm.  Left ventricular function, chamber size, wall motion, and wall thickness are  normal.The EF is 55-60%.  S/P mechanical aortic valve. The aortic valve appears well-seated. Limited  Doppler interrogation of the aortic valve is normal (MG 5 mmHg.)  No pericardial effusion is present.  Previous study not available for comparison.date: results: date: results: date: results:          METS/Exercise Tolerance:     Hematologic:  - neg hematologic  ROS       Musculoskeletal:         GI/Hepatic:  - neg GI/hepatic ROS       Renal/Genitourinary:     (+) chronic renal disease, type: ARF,       Endo:  - neg endo ROS       Psychiatric:         Infectious Disease:         Malignancy:         Other:                         PHYSICAL EXAM:   Mental Status/Neuro:    Airway: Facies: Feasible  Mallampati: Not Assessed  Mouth/Opening: Not Assessed  TM distance: Not Assessed  Neck ROM: Not Assessed  Airway Device: ETT   Respiratory:    CV:    Comments: Intubated                      LABS:  CBC: No results found for: WBC, HGB, HCT, PLT  BMP: No results found for: NA, POTASSIUM, CHLORIDE, CO2, BUN, CR,  GLC  COAGS: No results found for: PTT, INR, FIBR  POC: No results found for: BGM, HCG, HCGS  OTHER: No results found for: PH, LACT, A1C, ELLA, PHOS, MAG, ALBUMIN, PROTTOTAL, ALT, AST, GGT, ALKPHOS, BILITOTAL, BILIDIRECT, LIPASE, AMYLASE, NICKY, TSH, T4, T3, CRP, SED     Preop Vitals    BP Readings from Last 3 Encounters:   No data found for BP    Pulse Readings from Last 3 Encounters:   No data found for Pulse      Resp Readings from Last 3 Encounters:   No data found for Resp    SpO2 Readings from Last 3 Encounters:   No data found for SpO2      Temp Readings from Last 1 Encounters:   No data found for Temp    Ht Readings from Last 1 Encounters:   No data found for Ht      Wt Readings from Last 1 Encounters:   No data found for Wt    There is no height or weight on file to calculate BMI.     LDA:        Assessment:   ASA SCORE: 4    H&P: History and physical reviewed and following examination; no interval change.     Smoking Status:  Active Smoker       - patient did not smoke on day of surgery       - not instructed to abstain from smoking on day of procedure   NPO Status: NPO Appropriate     Plan:   Anes. Type:  General   Pre-Medication: None   Induction:  IV (Standard)   Airway: ETT; Oral   Access/Monitoring: PIV; 2nd PIV; A-Line; CVL; MAC-Line; PAC   Maintenance: Balanced     Blood products: Blood in Room; PRBC; FFP; Cell Saver; PLT; Cryo     Drips/Meds: Norepi; Vasopressin; Epinephrine     Advanced Monitoring: MONAE Adult            ADULT MONAE Checklist:               Absolute Contra-Indications: NONE               Relative Contra-Indications:  NONE               Final Plan: Proceed with MONAE     Postop Plan:   Postop Pain: Opioids  Postop Sedation/Airway: Sedation likely       Postop Sedation: Propofol Infusion  Disposition: Inpatient/Admit; ICU     PONV Management:   Adult Risk Factors:, Postop Opioids   Prevention:, Propofol     CONSENT: Direct conversation   Plan and risks discussed with: Patient; Spouse    Blood Products: Consented (ALL Blood Products)       Comments for Plan/Consent:  Held discussion with wife and daughters in the presence of the patient who was intubated but only mildly sedated. All questions answered. Plan for A-line, CVC, PAC, MONAE                  Suresh Michel MD

## 2020-01-17 NOTE — PLAN OF CARE
Patient was evaluated in the ED by myself and Dr. Weeks. He developed back pain yesterday afternoon and was airlifted to [facility].  [facility] referred him to us this morning.  He arrived at around 12 noon and had been intubated overnight and his BP was soft in the air.  His initial ABG showed a pH of 7.29 and a bicarb of 14.  He was very ruboric and his hands and feet were cold.  His CT scan showed a type A dissection beginning in the mid ascending aorta.  The head vessels come off the true lumen.  His LV ejection fraction was 30 as judged by Dr. Guille Parrish.  His oxygenation was poor with a pO2 of 100 on an FiO2 of 100 percent. Abdomen is very distended. He was not felt to be a good surgical candidate at this time and Dr. Weeks agreed that medical management for now was appropriate.  We would like to extubate him eventually and involve him in the decision-making process.  Prognosis is not good.

## 2020-01-17 NOTE — PHARMACY-ADMISSION MEDICATION HISTORY
Admission medication history interview status for the 1/17/2020 admission is complete. See Epic admission navigator for allergy information, pharmacy, prior to admission medications and immunization status.     Medication history interview sources:  [facility] [place] ([phone]), phone call w/ patient's wife Silvia ([phone])    Changes made to PTA medication list (reason)  Added: Sildenafil, latanoprost, timolol, and warfarin  Deleted: None  Changed: None    Additional medication history information (including reliability of information, actions taken by pharmacist):  -Last dose warfarin Wednesday evening (1/15) - 10 mg.    Prior to Admission medications    Medication Sig Last Dose Taking? Auth Provider   latanoprost (XALATAN) 0.005 % ophthalmic solution Place 1 drop into the right eye At Bedtime  Yes Unknown, Entered By History   sildenafil (VIAGRA) 50 MG tablet Take 50 mg by mouth daily as needed  Yes Unknown, Entered By History   timolol maleate (TIMOPTIC) 0.25 % ophthalmic solution Place 1 drop into the right eye every morning  Yes Unknown, Entered By History   warfarin ANTICOAGULANT (COUMADIN) 4 MG tablet Take 2.5 tablets (10 mg) by mouth every Wednesday evening. Take 2 tablets (8 mg) by mouth all other days in the evening. 1/15/2020 at 2100 Yes Unknown, Entered By History     Medication history completed by:   Latasha Flores, PharmD  PGY-1 Pharmacy Resident

## 2020-01-17 NOTE — PROCEDURES
Procedure Note  DATE OF OPERATION: 1/17/2020  PREOPERATIVE DIAGNOSIS: Aortic Dissection   POSTOPERATIVE DIAGNOSIS: Aortic Dissection   OPERATION PERFORMED: Central Venous Catheter Placement  SURGEON: Marcel Goff MD, Dr Cameron supervising.   ANESTHESIA: Local  EBL: 0 mL  COMPLICATIONS: None  INDICATIONS FOR PROCEDURE:   The patient is a 74 year old male with type A aortic dissection, who requires a central line for IV access and hemodynamic management.   DESCRIPTION OF OPERATIVE PROCEDURE:   Pre-procedure consent for the procedure was obtained. A time out was performed. The patient was placed in Trendelenburg position. The right neck region was prepped and draped in sterile fashion. Anesthesia was achieved with lidocaine. Using ultrasound guidance, the introducer needle was inserted over the right internal jugular vein. Venous blood was aspirated. The syringe was removed and a guidewire was advanced through the introducer needle which was then withdrawn. A small incision was made at the skin surface with a scalpel and a dilator was advanced over the guidewire. After appropriate dilation was obtained, the dilator was then removed and an 8.5 [place], triple-lumen central venous catheter was advanced over the wire, which was then removed. All ports easily flushed and aspirated. The catheter was sutured in place at 15 cm at the skin and a sterile dressing was applied. The patient tolerated the procedure without any hemodynamic compromise. A post-procedure x-ray shows the tip of the catheter within the SVC and no pneumothorax. Dr Cameron is the supervising intensivist and was available for assistance.  DISPOSITION: stable, OK to use central line    Marcel Goff MD

## 2020-01-17 NOTE — ED PROVIDER NOTES
[place] EMERGENCY DEPARTMENT ([facility])  1/17/20  ED 2    History     Chief Complaint   Patient presents with     Hypotension     The history is provided by medical records and the EMS personnel. The history is limited by the condition of the patient (Intubated).     Enmanuel Hernandez is a 74 year old male who presents via [facility] for further evaluation and management ofabdominal aortic aneurysm with type A dissection.  Patient is intubated and unable to provide history.  Apparently patient was seen at [place] and was flown [place] to [facility] for further evaluation. EMS crew thought he would go to surgical suite but ended up going to ICU. He was kept in the ICU overnight before being flown here to [facility] for further evaluation and management.  Dr. Gtuierrez was aware of patient's arrival.  Bulk of history is provided by EMS personnel.  The [facility] EMS personnel are familiar with patient and that they were the ones who transported him from [facility] to [facility] last night.  EMS report that yesterday he appeared gray and ashen and appears to be perfusing much better today. They also note that patient was moving all 4 extremities, alert and oriented x 4 yesterday before being intubated. Since then patient has received 6 units of PRBCs; 2 units from [place] [facility], 4 units at [facility]. Patient received a total of 3 units of albumin in hospital and had an additional unit of albumin during transport. EMS state that during flight down to [facility] he did have period of hypotension with blood pressure of 50/40. He was given 250 ml albumin and responded well to this, and pressures dilan to 90s systolic. He was transported on ventilator with respiratory rate of 12, FiO2 60% PEEP of 7.  Patient has ron synephrine, Precedex and Propofol infusing. His ron synephrine was titrated up to 1.5, and then back down to 0.8 mg/hr. Patient has an arterial line in place, as well as 16 ga x 2 and one 18 ga. His  MAP was at 40, and at last check was in the 80s.  They state that his Hgb was 20, and his potassium was 4.9 prior to transport.     I have reviewed the Medications, Allergies, Past Medical and Surgical History, and Social History in the [employer] system.    PAST MEDICAL HISTORY: No past medical history on file.    PAST SURGICAL HISTORY: No past surgical history on file.    FAMILY HISTORY: No family history on file.    SOCIAL HISTORY:   Social History     Tobacco Use     Smoking status: Not on file   Substance Use Topics     Alcohol use: Not on file       Patient's Medications   New Prescriptions    No medications on file   Previous Medications    LATANOPROST (XALATAN) 0.005 % OPHTHALMIC SOLUTION    Place 1 drop into the right eye At Bedtime    SILDENAFIL (VIAGRA) 50 MG TABLET    Take 50 mg by mouth daily as needed    TIMOLOL MALEATE (TIMOPTIC) 0.25 % OPHTHALMIC SOLUTION    Place 1 drop into the right eye every morning    WARFARIN ANTICOAGULANT (COUMADIN) 4 MG TABLET    Take 2.5 tablets (10 mg) by mouth every Wednesday evening. Take 2 tablets (8 mg) by mouth all other days in the evening.   Modified Medications    No medications on file   Discontinued Medications    No medications on file        Not on File     Review of Systems   Unable to perform ROS: Intubated       Physical Exam   BP: 97/68(A Line)  Pulse: 63  Heart Rate: 62  Temp: 98.5  F (36.9  C)  Resp: 22  SpO2: 98 %      Physical Exam  Vitals signs and nursing note reviewed.   Constitutional:       General: He is in acute distress.      Appearance: He is well-developed. He is ill-appearing. He is not diaphoretic.      Comments: Intubated with IV pressors and sedation   HENT:      Head: Normocephalic and atraumatic.   Eyes:      General: No scleral icterus.     Pupils: Pupils are equal, round, and reactive to light.   Neck:      Musculoskeletal: Normal range of motion and neck supple. No neck rigidity or muscular tenderness.   Cardiovascular:      Rate and Rhythm:  Normal rate and regular rhythm.      Heart sounds: Normal heart sounds.   Pulmonary:      Effort: No respiratory distress.      Breath sounds: Normal breath sounds.      Comments: intub with bs equal  Abdominal:      General: Bowel sounds are normal.      Palpations: Abdomen is soft. There is no mass.      Tenderness: There is no abdominal tenderness.   Musculoskeletal:         General: No tenderness.      Right lower leg: Edema present.      Left lower leg: Edema present.      Comments: Pulses noted weak distally   Skin:     General: Skin is warm and dry.      Capillary Refill: Capillary refill takes less than 2 seconds.      Findings: Erythema present. No rash.      Comments: No cyanosis seen currently   Neurological:      Comments: Sedate without movement in the ER   Psychiatric:      Comments: Unable to assess           ED Course        Procedures         Prior to arrival I did talk to Dr. Gutierrez regarding our plan.  Patient currently in the ICU was admitted to [facility] last evening around 5 PM.  Patient is intubated on pressors sedated etc.  The plan will be is to have the Patient down to the ER and cardiothoracic surgery will see the patient upon arrival and plan to take the patient the OR.  Team was alerted the OR was alerted also and is prepared.  When the patient arrived I did talk to Dr. Gutierrez who did come down see the patient in the ER along with other of the cardiothoracic team.  On arrival patient is sedated otherwise as noted.  We did redraw labs this point EKG was done revealing some mild low voltage otherwise patient continued on the Osvaldo-Synephrine drip along with Precedex and propofol drips.  Seen by the surgical team.  Echo was evaluated.  Blood gas was drawn also.  Along with other labs.  Patient is blood gas pH is 7.30 with PCO2 of 30 PO2 is 102.  Bicarb is 15.  Patient's lactic acid believe was 1.2.  Magnesium 2.2 phosphorus 2.8.  Type and screen done along with  garfield.  Chemistries noted sodium 141 potassium 4.3.  Chloride is 117 bicarb is 15.  Creatinine 1.35.  GFR is 50 total bilirubin is 2.2.  ALT is 169 AST is 210.  INR 1.45.  PTT is 39.  White count 19.3.  Hemoglobin is 19.7 also.  Platelets are 186.  Should be noted further review I think patient did receive packed red cells at [facility]'s prior but records are limited this point.  Also evaluating records patient appears to be on warfarin also.    Further evaluation in the ER by the surgical team and discussion with family members are here initial plan was to take the patient to the OR but at this point they recommended ICU admission for ongoing management of underlying metabolic all issues along with increasing risk as far as surgical risks etc.  Were planning to meet the patient the ICU under the care of Dr. Gutierrez and they will continue to manage discussed with family and make further recommendations at this point patient continued with medications IV etc. as prior.  Formal echo was also done down here in the ER.       EKG Interpretation:      Interpreted by Devin Weber MD  Time reviewed: 1231  Symptoms at time of EKG: aortic dissection type A   Rhythm: normal sinus   Rate: 61  Axis: normal  Ectopy: none  Conduction:some decrease voltage  ST Segments/ T Waves: Nonspecific ST-T wave changes  Q Waves: none  Comparison to prior: No old EKG available    Clinical Impression: normal sinus rhythm          Critical Care time:  was 45 minutes for this patient excluding procedures.             Results for orders placed or performed during the hospital encounter of 01/17/20 (from the past 24 hour(s))   CBC with platelets differential   Result Value Ref Range    WBC 19.3 (H) 4.0 - 11.0 10e9/L    RBC Count 6.53 (H) 4.4 - 5.9 10e12/L    Hemoglobin 19.7 (H) 13.3 - 17.7 g/dL    Hematocrit 59.7 (H) 40.0 - 53.0 %    MCV 91 78 - 100 fl    MCH 30.2 26.5 - 33.0 pg    MCHC 33.0 31.5 - 36.5 g/dL    RDW 17.6 (H) 10.0 - 15.0 %     Platelet Count 186 150 - 450 10e9/L    Diff Method Automated Method     % Neutrophils 75.0 %    % Lymphocytes 12.1 %    % Monocytes 11.7 %    % Eosinophils 0.0 %    % Basophils 0.6 %    % Immature Granulocytes 0.6 %    Nucleated RBCs 0 0 /100    Absolute Neutrophil 14.5 (H) 1.6 - 8.3 10e9/L    Absolute Lymphocytes 2.3 0.8 - 5.3 10e9/L    Absolute Monocytes 2.3 (H) 0.0 - 1.3 10e9/L    Absolute Eosinophils 0.0 0.0 - 0.7 10e9/L    Absolute Basophils 0.1 0.0 - 0.2 10e9/L    Abs Immature Granulocytes 0.1 0 - 0.4 10e9/L    Absolute Nucleated RBC 0.0    ABO/Rh type and screen   Result Value Ref Range    ABO O     RH(D) Pos     Antibody Screen Neg     Test Valid Only At          [facility]    Specimen Expires 01/20/2020    INR   Result Value Ref Range    INR 1.45 (H) 0.86 - 1.14   Partial thromboplastin time   Result Value Ref Range    PTT 39 (H) 22 - 37 sec   Comprehensive metabolic panel   Result Value Ref Range    Sodium 141 133 - 144 mmol/L    Potassium 4.3 3.4 - 5.3 mmol/L    Chloride 117 (H) 94 - 109 mmol/L    Carbon Dioxide 15 (L) 20 - 32 mmol/L    Anion Gap 10 3 - 14 mmol/L    Glucose 97 70 - 99 mg/dL    Urea Nitrogen 25 7 - 30 mg/dL    Creatinine 1.35 (H) 0.66 - 1.25 mg/dL    GFR Estimate 50 (L) >60 mL/min/[1.73_m2]    GFR Estimate If Black 58 (L) >60 mL/min/[1.73_m2]    Calcium 7.8 (L) 8.5 - 10.1 mg/dL    Bilirubin Total 2.2 (H) 0.2 - 1.3 mg/dL    Albumin 3.7 3.4 - 5.0 g/dL    Protein Total 5.4 (L) 6.8 - 8.8 g/dL    Alkaline Phosphatase 48 40 - 150 U/L     (H) 0 - 70 U/L     (H) 0 - 45 U/L   Magnesium   Result Value Ref Range    Magnesium 2.2 1.6 - 2.3 mg/dL   Phosphorus   Result Value Ref Range    Phosphorus 2.8 2.5 - 4.5 mg/dL   EKG 12-lead, tracing only   Result Value Ref Range    Interpretation ECG Click View Image link to view waveform and result    Blood gas arterial (ABG)   Result Value Ref Range    pH Arterial 7.30 (L) 7.35 - 7.45 pH    pCO2  Arterial 30 (L) 35 - 45 mm Hg    pO2 Arterial 102 80 - 105 mm Hg    Bicarbonate Arterial 15 (L) 21 - 28 mmol/L    Base Deficit Art 10.0 mmol/L    FIO2 60    Lactic acid whole blood   Result Value Ref Range    Lactic Acid 1.2 0.7 - 2.0 mmol/L   Plasma prepare order unit   Result Value Ref Range    Blood Component Type Plasma     Units Ordered 4      Medications   lidocaine 1 % 0.1-1 mL (has no administration in time range)   lidocaine (LMX4) cream (has no administration in time range)   sodium chloride (PF) 0.9% PF flush 3 mL (has no administration in time range)   sodium chloride (PF) 0.9% PF flush 3 mL (3 mLs Intracatheter Not Given 1/17/20 1240)   phenylephrine (DARREL-SYNEPHRINE) 50 mg in sodium chloride 0.9 % 250 mL infusion (0.8 mcg/kg/min × 90 kg (Order-Specific) Intravenous Rate/Dose Change 1/17/20 1501)   propofol (DIPRIVAN) infusion (30 mcg/kg/min × 90 kg (Order-Specific) Intravenous Rate/Dose Change 1/17/20 1445)   esmolol 2000 mg in sodium chloride 0.9% 100 mL infusion (has no administration in time range)   heparin  drip 25,000 units in 0.45% NaCl 250 mL  ANTICOAGULANT  (see additional administration details for dose) (1,080 Units/hr Intravenous New Bag 1/17/20 1433)   heparin bolus from infusion pump (has no administration in time range)   midazolam (VERSED) injection 2 mg (has no administration in time range)   oxyCODONE (ROXICODONE) tablet 5 mg (has no administration in time range)   HYDROmorphone (DILAUDID) injection 0.2-0.5 mg (has no administration in time range)   midazolam (VERSED) injection 5 mg (5 mg Intravenous Given 1/17/20 1250)   sodium bicarbonate 8.4 % injection 50 mEq (50 mEq Intravenous Given 1/17/20 1343)   sodium bicarbonate 8.4 % injection 50 mEq (50 mEq Intravenous Given 1/17/20 1329)   perflutren diluted 1mL to 2mL with saline (OPTISON) diluted injection 4 mL (9 mLs Intravenous Given 1/17/20 1451)         Assessments & Plan (with Medical Decision Making)  74-year-old male was  transferred from [facility] ICU for type A dissection to the ER plan to go to the OR.  Patient arrived to the ER intubated along with this is on Osvaldo-Synephrine IV along with Precedex and propofol drips.  Vitally is been relatively stable here perfusing throughout.  Patient seen by the cardiothoracic team immediately upon arrival here in the ER.  Evaluating patient reviewing labs etc. noted above they felt at this point patient higher risk to take to the OR currently at this point.  Discussed with family who is here also and recommendations per cardiothoracic team are for admission to ICU at this point ongoing medical management observation.  Plan at this point now is per recommendations of the cardiothoracic staff to admit the patient the ICU and they will continue to manage with a type a dissection.  Currently no plan to go to the OR. Patient stable in the ER.         I have reviewed the nursing notes.    I have reviewed the findings, diagnosis, plan and need for follow up with the patient.    New Prescriptions    No medications on file       Final diagnoses:   Dissection of aorta, unspecified portion of aorta (H) - Type A       1/17/2020   [facility], EMERGENCY DEPARTMENT    This note was created at least in part by the use of dragon voice dictation system. Inadvertent typographical errors may still exist.  Devin Weber MD.         Devin Weber MD  01/17/20 6629

## 2020-01-17 NOTE — ED NOTES
"Bed: ED02  Expected date: 1/17/20  Expected time: 11:07 AM  Means of arrival:   Comments:  Enmanuel Hernandez: ([birth year]) 74 year old male developed back and chest pain yesterday while shoveling snow; taken to [facility] where CT scan revealed ascending aortic artery dissection (history of aortic valve replacement).  Patient taken to [facility].  Patient intubated(TV-500, PEEP- 5, and rate-12); OG (minimal yellow returns) and ayala placed (UVs at /hour).  Received 6 unit  s RBCs and 4 units albumin.  Patient currently on phenylephrine at 0.9; precidex @ 0.4; and propofol @ 0.5.  Patient demonstrating multi-focal PVCs on cardiac monitor; BP being maintained at  systolic.Hgb at 20.6.  Patient received one amp calcium gluconate. K 4.9. Pulses in LE need doppler; hands discolored.  Wife (Vida) and daughter (Sanjay [phone]) coming to [facility].  Patient has \"angelique  fied\" code; may intubate, but no CPR.  MICHAEL Dejesus at [facility]: [phone].  "

## 2020-01-17 NOTE — ED TRIAGE NOTES
Pt comes in from flight from [facility] in [place]. C/o dissection. Pt VS on arrival and throughout tx. On Propofol, Presedex, and Phenyl for sedation. MAPS in route was 40-80. 1U albumin given in route.

## 2020-01-18 NOTE — PLAN OF CARE
Patient arrived from ED at 1600. Sedated and intubated. Osvaldo and heparin infusing. Dr Goff placed RIJ central line at bedside, waiting for verification. Urine output adequate. Will continue to monitor and notify MD of any changes.

## 2020-01-18 NOTE — PROVIDER NOTIFICATION
Notified CVTS MD of ABG post decrease in PEEP. MD ok with keeping PEEP at 5. Notified MD that vent order needs to be changed to reflect peep. MD to change order.

## 2020-01-18 NOTE — PROVIDER NOTIFICATION
Notified CVTS MD of current lab results. MD would like PEEP to stay at 8 overnight to optimize pt. No plan to pressure support at this time, will reevaluate tomorrow per Dr. Cameron.

## 2020-01-18 NOTE — PROGRESS NOTES
CVTS PROGRESS NOTE  01/18/2020      PRIMARY TEAM: CVTS   PRIMARY PHYSICIAN: Dr. Gutierrez  REASON FOR CRITICAL CARE ADMISSION: Hemodynamic instability/aortic dissection.   ADMITTING PHYSICIAN: Dr. Cameron     ASSESSMENT:  Enmanuel Hernandez is a 74 year old male who was admitted for aortic dissection. Patient transferred from [place] 1/16/20 and then transferred to [facility] in [place]. Air lifted to [facility] 1/17/20 where patient was found to be a prohibitively high risk of operation given his history of AVR. Will medically manage and optimize to reassess risk of surgery.     PLAN:  -Keep HR <60 and SBP <100  -Titrate drips as able.   -Q6hr labs  -Discuss code status with wife, Patient does not want Chest compressions but ok with having an ETT.   -CVP monitoring  -Flotrak started  -TKO fluids  -Nutrition consult  - F/u ABG 1230  - Hold feeding tube at this time, use OG for feeds if improved respiratory status    Neurological:  # Intubated requiring sedation  - Monitor neurological status. Delirium preventions and precautions.   - Pain: fent gtt.     - Sedation plan: Prop gtt.     Pulmonary:   # Mechanical ventilation   - VENT: Continue full vent support. PST when meets criteria.    - Ventilatory bundle. HOB elevation.  - Incentive spirometer every 15- 30 minutes when extubated    Cardiovascular:    #History of AVR  #Ascending aortic dissection  - Monitor hemodynamic status.   - Wean pressors as able  - Monitor pulses in all 4 extremities.   - SBP <100  - HR <60  - Esmolol gtt  - Phenylephrine gtt.   -Monitor CVP  - Monitor via flotrak      Gastroenterology/Nutrition:  - NPO for now  - ADAT after extubation   - Trend lactic acid.     Electrolytes/Renal:  - Urine output is adequate. Will continue to monitor intake and output.  - ayala catheter.  - Trend BMP     Endocrine:  # Hyperglycemia  - Insulin gtt.   - Goal to keep BG< 180 for optimal wound healing     ID:  - Started broad spectrum.   - Sent cultures.     Heme:      -Transfuse if hgb <7.0 or signs/symptoms of hypoperfusion.   -T&S valid.       Skin:  - No skin issues  - Diligent skin cares    General Cares/Prophylaxis:    DVT Prophylaxis: Heparin gtt  GI Prophylaxis: PPI  Restraints: Restraints for medical healing needed: NO    Lines/ tubes/ drains:  - ETT, PIV, A.line, ayala, RIJ CVC.     Disposition:  - CV ICU.     Patient seen, findings and plan discussed with CVTS    Herbert Marinelli   Anesthesiology Resident PGY-3, CA-2  CVICU  - - - - - - - - - - - - - - - - - - - - - - - - - - - - - - - - - - - - - - - - - - - - - -   HISTORY PRESENTING ILLNESS:   74 year old male who was admitted for aortic dissection. Patient transferred from [place] 1/16/20 and then transferred to [facility] in [place]. Air lifted to [facility] 1/17/20 where patient was found to be a prohibitively high risk of operation given his history of AVR. Will medically manage and optimize to reassess risk of surgery.     REVIEW OF SYSTEMS: Unable to obtain ROS due to intubation/sedation.     PAST MEDICAL HISTORY:   No past medical history on file.    SURGICAL HISTORY:   No past surgical history on file.    SOCIAL HISTORY:   Social History     Socioeconomic History     Marital status: Not on file     Spouse name: Not on file     Number of children: Not on file     Years of education: Not on file     Highest education level: Not on file   Occupational History     Not on file   Social Needs     Financial resource strain: Not on file     Food insecurity:     Worry: Not on file     Inability: Not on file     Transportation needs:     Medical: Not on file     Non-medical: Not on file   Tobacco Use     Smoking status: Not on file   Substance and Sexual Activity     Alcohol use: Not on file     Drug use: Not on file     Sexual activity: Not on file   Lifestyle     Physical activity:     Days per week: Not on file     Minutes per session: Not on file     Stress: Not on file   Relationships     Social connections:      Talks on phone: Not on file     Gets together: Not on file     Attends Baptism service: Not on file     Active member of club or organization: Not on file     Attends meetings of clubs or organizations: Not on file     Relationship status: Not on file     Intimate partner violence:     Fear of current or ex partner: Not on file     Emotionally abused: Not on file     Physically abused: Not on file     Forced sexual activity: Not on file   Other Topics Concern     Not on file   Social History Narrative     Not on file     FAMILY HISTORY: No bleeding/clotting disorders nor problems with anesthesia.     ALLERGIES:   Allergies   Allergen Reactions     Cephalexin Rash     [facility] from [facility] noted rash as 'severe'     Fluorouracil Unknown     Warfarin Unknown     TEVA BRAND ONLY     Clindamycin Rash     Penicillins Rash       MEDICATIONS:  No current facility-administered medications on file prior to encounter.   latanoprost (XALATAN) 0.005 % ophthalmic solution, Place 1 drop into the right eye At Bedtime  sildenafil (VIAGRA) 50 MG tablet, Take 50 mg by mouth daily as needed  timolol maleate (TIMOPTIC) 0.25 % ophthalmic solution, Place 1 drop into the right eye every morning  warfarin ANTICOAGULANT (COUMADIN) 4 MG tablet, Take 2.5 tablets (10 mg) by mouth every Wednesday evening. Take 2 tablets (8 mg) by mouth all other days in the evening.        PHYSICAL EXAMINATION:  Temp:  [98.1  F (36.7  C)-99.3  F (37.4  C)] 98.1  F (36.7  C)  Pulse:  [55-69] 55  Heart Rate:  [56-82] 72  Resp:  [14-26] 20  BP: ()/(47-79) 95/79  MAP:  [52 mmHg-290 mmHg] 70 mmHg  Arterial Line BP: ()/() 91/56  FiO2 (%):  [50 %-80 %] 50 %  SpO2:  [92 %-100 %] 99 %  General: intubated, sedated, in no acute distress  HEENT: moist mucous membranes   Neuro: sedated currently, does not follow commands  Pulm/Resp: mechanically ventilated, breathing non-labored  CV: regular rate, regular rhythm  Abdomen: Soft, non-distended  : ayala  catheter in place, urine yellow and clear  Incisions/Skin: dressings clean, dry, intact  MSK/Extremities: peripheral pulses intact, extremities well perfused    LABS: Reviewed.   Arterial Blood Gases   Recent Labs   Lab 20  0859 20  0357 20  2329 20   PH 7.37 7.33* 7.34* 7.36   PCO2 34* 37 32* 30*   PO2 73* 128* 118* 69*   HCO3 19* 19* 17* 17*     Complete Blood Count   Recent Labs   Lab 20  1101 20   WBC 19.2* 21.0* 23.2* 22.3*   HGB 16.3 17.4 18.8* 19.6*   * 160 164 169     Basic Metabolic Panel  Recent Labs   Lab 20  0416 20  1639    146* 146* 146*   POTASSIUM 3.8 3.7 4.4 3.8   CHLORIDE 117* 119* 116* 118*   CO2 20 18* 22 18*   BUN 28 26 25 24   CR 1.08 1.15 1.31* 1.25   * 90 82 80     Liver Function Tests  Recent Labs   Lab 20  0416 20  16320  1226   * 191* 209*  --  210*   * 164* 174*  --  169*   ALKPHOS 44 47 49  --  48   BILITOTAL 1.8* 1.8* 1.9*  --  2.2*   ALBUMIN 3.1* 3.2* 3.4  --  3.7   INR 1.44*  --   --  1.47* 1.45*     Pancreatic Enzymes  No lab results found in last 7 days.  Coagulation Profile  Recent Labs   Lab 20  04120  16320  1226   INR 1.44* 1.47* 1.45*   *  --  39*       IMAGING:  Recent Results (from the past 24 hour(s))   Echo Complete    Narrative    [id number]  [id number]  [id number]  [id number]^JANE^[facility],[place]  Echocardiography Laboratory  [address] [place] [address]     Name: SYDNEY CANO  MRN: [id number]  : 1945  Study Date: 2020 01:54 PM  Age: 74 yrs  Gender: Male  Patient Location: [facility]  Reason For Study: Aortic Dissection  Ordering Physician: MARIA DOLORES LARA  Performed By: Hang Mendez  Height: 66 in     HR:  62  _____________________________________________________________________________  __        Procedure  Complete Portable Echo Adult. Optison (NDC #6466-9410-85) given intravenously.  Patient was given 9 ml mixture of 3 ml Optison and 6 ml saline. 0 ml wasted.  _____________________________________________________________________________  __        Interpretation Summary  Aortic dissection with flap visualized in the proximal arch with severely  dilated ascending aorta measuring 5.7 cm.  Left ventricular function, chamber size, wall motion, and wall thickness are  normal.The EF is 55-60%.  S/P mechanical aortic valve. The aortic valve appears well-seated. Limited  Doppler interrogation of the aortic valve is normal (MG 5 mmHg.)  No pericardial effusion is present.  Previous study not available for comparison.  _____________________________________________________________________________  __        Left Ventricle  Left ventricular function, chamber size, wall motion, and wall thickness are  normal.The EF is 55-60%. Left ventricular diastolic function is not  assessable. No regional wall motion abnormalities are seen.     Right Ventricle  The right ventricle cannot be assessed.     Atria  The atria cannot be assessed.     Mitral Valve  The mitral valve cannot be assessed.     Aortic Valve  A mechanical aortic valve is present. Doppler interrogation of the aortic  valve is normal. The mean gradient is 5 mmHg.        Tricuspid Valve  The tricuspid valve cannot be assessed. The peak velocity of the tricuspid  regurgitant jet is not obtainable. Pulmonary artery systolic pressure cannot  be assessed.     Pulmonic Valve  The pulmonic valve cannot be assessed.     Vessels  Ascending aorta 5.7 cm. Aortic dissection is present involving the arch.  Dilation of the inferior vena cava is present with abnormal respiratory  variation in diameter.     Pericardium  No pericardial effusion is present.     Compared to Previous  Study  Previous study not available for comparison.     _____________________________________________________________________________  __                       Report approved by: Rena Novak 01/17/2020 03:37 PM                 _____________________________________________________________________________  __      XR Abdomen Port 1 View    Narrative    Exam:  XR ABDOMEN PORT 1 VW, 1/17/2020 10:21 PM    History: og verification    Comparison:  None    Findings:  Portable supine radiograph of the abdomen. Gastric tube  sidehole projects over the gastric antrum with tip likely in the  proximal duodenum. Epicardial pacing wires. No abnormally dilated  bowel. No pneumatosis or portal venous gas. Degenerative changes in  the spine.      Impression    Impression:    Gastric tube sidehole projects over the gastric antrum with the tip  likely in the proximal duodenum. Consider slight retraction.    I have personally reviewed the examination and initial interpretation  and I agree with the findings.    RHODA ANTHONY MD   XR Chest Port 1 View    Narrative    Exam:  XR CHEST PORT 1 VW, 1/17/2020 10:20 PM    History: respiratory failure    Comparison:  1/17/2020    Findings:  Portable supine radiograph of the chest. Endotracheal tube  tip projects approximately 4 cm above the [place]. Enteric tube is  collimated off the field-of-view. Median sternotomy wires. Esophageal  temperature probe projects over the midchest. Right IJ central venous  catheter projects over the high SVC. Cardiac silhouette is enlarged.  Small bilateral pleural effusions and dense retrocardiac opacities.  Mild diffuse interstitial opacities. No pneumothorax.      Impression    Impression:    1. Small bilateral pleural effusions and adjacent atelectasis and/or  consolidation.  2. Mild interstitial pulmonary edema.  3. Support devices as above.    I have personally reviewed the examination and initial interpretation  and I agree with the  findings.    RHODA ANTHONY MD   XR Chest Port 1 View    Narrative    Exam: XR CHEST PORT 1 VW, 1/18/2020 8:23 AM    Indication: respiratory failure    Comparison: Chest x-ray 1/17/2020    Findings:   Portable semiupright AP radiograph of the chest. Endotracheal tube tip  projects 4.7 cm above the [place]. Right IJ central venous catheter tip  projects over the high SVC. Enteric tube courses below the diaphragm.  Esophageal temperature probe tip projects near the gastroesophageal  junction. Prominent cardiac silhouette with widening of the  mediastinum and increased interstitial markings bilaterally. Unchanged  hazy retrocardiac opacities. No pneumothorax. Trace bilateral pleural  effusions.      Impression    Impression:   1. Stable position of supportive devices.  2. Stable prominent cardiac silhouette with widened mediastinum  (related to known aortic dissection) and interstitial pulmonary edema.    3. Unchanged retrocardiac atelectasis versus consolidation.    I have personally reviewed the examination and initial interpretation  and I agree with the findings.    CARRIE RAMIREZ, DO

## 2020-01-18 NOTE — PROGRESS NOTES
CV ICU PROGRESS NOTE  01/18/2020      PRIMARY TEAM: CVTS   PRIMARY PHYSICIAN: Dr. Gutirerez  REASON FOR CRITICAL CARE ADMISSION: Hemodynamic instability/aortic dissection.   ADMITTING PHYSICIAN: Dr. Cameron     ASSESSMENT:  Enmanuel Hernandez is a 74 year old male who was admitted for aortic dissection. Patient transferred from [place] 1/16/20 and then transferred to [facility] in [place]. Air lifted to [facility] 1/17/20 where patient was found to be a prohibitively high risk of operation given his history of AVR. Will medically manage and optimize to reassess risk of surgery.     PLAN:  -Keep HR <60 and SBP <100  -Titrate drips as able.   -Q6hr labs  -Discuss code status with wife, Patient does not want Chest compressions but ok with having an ETT.   -CVP monitoring  -Flotrak started  -TKO fluids  -Nutrition consult  - F/u ABG 1230  - Hold feeding tube at this time, use OG for feeds if improved respiratory status    Neurological:  # Intubated requiring sedation  - Monitor neurological status. Delirium preventions and precautions.   - Pain: fent gtt.     - Sedation plan: Prop gtt.     Pulmonary:   # Mechanical ventilation   - VENT: Continue full vent support. PST when meets criteria.    - Ventilatory bundle. HOB elevation.  - Incentive spirometer every 15- 30 minutes when extubated    Cardiovascular:    #History of AVR  #Ascending aortic dissection  - Monitor hemodynamic status.   - Wean pressors as able  - Monitor pulses in all 4 extremities.   - SBP <100  - HR <60  - Esmolol gtt  - Phenylephrine gtt.   -Monitor CVP  - Monitor via flotrak      Gastroenterology/Nutrition:  - NPO for now  - ADAT after extubation   - Trend lactic acid.     Electrolytes/Renal:  - Urine output is adequate. Will continue to monitor intake and output.  - ayala catheter.  - Trend BMP     Endocrine:  # Hyperglycemia  - Insulin gtt.   - Goal to keep BG< 180 for optimal wound healing     ID:  - Started broad spectrum.   - Sent cultures.     Heme:      -Transfuse if hgb <7.0 or signs/symptoms of hypoperfusion.   -T&S valid.       Skin:  - No skin issues  - Diligent skin cares    General Cares/Prophylaxis:    DVT Prophylaxis: Heparin gtt  GI Prophylaxis: PPI  Restraints: Restraints for medical healing needed: NO    Lines/ tubes/ drains:  - ETT, PIV, A.line, ayala, RIJ CVC.     Disposition:  - CV ICU.     Patient seen, findings and plan discussed with CV ICU staff, Dr. Cameron.    Herbert Marinelli   Anesthesiology Resident PGY-3, CA-2  CVICU  - - - - - - - - - - - - - - - - - - - - - - - - - - - - - - - - - - - - - - - - - - - - - -   HISTORY PRESENTING ILLNESS:   74 year old male who was admitted for aortic dissection. Patient transferred from [place] 1/16/20 and then transferred to [facility] in [place]. Air lifted to [facility] 1/17/20 where patient was found to be a prohibitively high risk of operation given his history of AVR. Will medically manage and optimize to reassess risk of surgery.     REVIEW OF SYSTEMS: Unable to obtain ROS due to intubation/sedation.     PAST MEDICAL HISTORY:   No past medical history on file.    SURGICAL HISTORY:   No past surgical history on file.    SOCIAL HISTORY:   Social History     Socioeconomic History     Marital status: Not on file     Spouse name: Not on file     Number of children: Not on file     Years of education: Not on file     Highest education level: Not on file   Occupational History     Not on file   Social Needs     Financial resource strain: Not on file     Food insecurity:     Worry: Not on file     Inability: Not on file     Transportation needs:     Medical: Not on file     Non-medical: Not on file   Tobacco Use     Smoking status: Not on file   Substance and Sexual Activity     Alcohol use: Not on file     Drug use: Not on file     Sexual activity: Not on file   Lifestyle     Physical activity:     Days per week: Not on file     Minutes per session: Not on file     Stress: Not on file   Relationships     Social  connections:     Talks on phone: Not on file     Gets together: Not on file     Attends [employer] service: Not on file     Active member of club or organization: Not on file     Attends meetings of clubs or organizations: Not on file     Relationship status: Not on file     Intimate partner violence:     Fear of current or ex partner: Not on file     Emotionally abused: Not on file     Physically abused: Not on file     Forced sexual activity: Not on file   Other Topics Concern     Not on file   Social History Narrative     Not on file     FAMILY HISTORY: No bleeding/clotting disorders nor problems with anesthesia.     ALLERGIES:   Allergies   Allergen Reactions     Cephalexin Rash     MAR from [facility] noted rash as 'severe'     Fluorouracil Unknown     Warfarin Unknown     TEVA BRAND ONLY     Clindamycin Rash     Penicillins Rash       MEDICATIONS:  No current facility-administered medications on file prior to encounter.   latanoprost (XALATAN) 0.005 % ophthalmic solution, Place 1 drop into the right eye At Bedtime  sildenafil (VIAGRA) 50 MG tablet, Take 50 mg by mouth daily as needed  timolol maleate (TIMOPTIC) 0.25 % ophthalmic solution, Place 1 drop into the right eye every morning  warfarin ANTICOAGULANT (COUMADIN) 4 MG tablet, Take 2.5 tablets (10 mg) by mouth every Wednesday evening. Take 2 tablets (8 mg) by mouth all other days in the evening.        PHYSICAL EXAMINATION:  Temp:  [98.1  F (36.7  C)-99.3  F (37.4  C)] 98.1  F (36.7  C)  Pulse:  [55-69] 55  Heart Rate:  [56-82] 72  Resp:  [14-26] 20  BP: ()/(47-79) 95/79  MAP:  [52 mmHg-290 mmHg] 70 mmHg  Arterial Line BP: ()/() 91/56  FiO2 (%):  [50 %-80 %] 50 %  SpO2:  [92 %-100 %] 99 %  General: intubated, sedated, in no acute distress  HEENT: moist mucous membranes   Neuro: sedated currently, does not follow commands  Pulm/Resp: mechanically ventilated, breathing non-labored  CV: regular rate, regular rhythm  Abdomen: Soft,  non-distended  : ayala catheter in place, urine yellow and clear  Incisions/Skin: dressings clean, dry, intact  MSK/Extremities: peripheral pulses intact, extremities well perfused    LABS: Reviewed.   Arterial Blood Gases   Recent Labs   Lab 20  0859 20  0357 209 20   PH 7.37 7.33* 7.34* 7.36   PCO2 34* 37 32* 30*   PO2 73* 128* 118* 69*   HCO3 19* 19* 17* 17*     Complete Blood Count   Recent Labs   Lab 20  1101 20  0416 20   WBC 19.2* 21.0* 23.2* 22.3*   HGB 16.3 17.4 18.8* 19.6*   * 160 164 169     Basic Metabolic Panel  Recent Labs   Lab 20  04120  1639    146* 146* 146*   POTASSIUM 3.8 3.7 4.4 3.8   CHLORIDE 117* 119* 116* 118*   CO2 20 18* 22 18*   BUN 28 26 25 24   CR 1.08 1.15 1.31* 1.25   * 90 82 80     Liver Function Tests  Recent Labs   Lab 20  0416 20  16320  1226   * 191* 209*  --  210*   * 164* 174*  --  169*   ALKPHOS 44 47 49  --  48   BILITOTAL 1.8* 1.8* 1.9*  --  2.2*   ALBUMIN 3.1* 3.2* 3.4  --  3.7   INR 1.44*  --   --  1.47* 1.45*     Pancreatic Enzymes  No lab results found in last 7 days.  Coagulation Profile  Recent Labs   Lab 20  0416 20  16320  1226   INR 1.44* 1.47* 1.45*   *  --  39*       IMAGING:  Recent Results (from the past 24 hour(s))   Echo Complete    Narrative    [id number]  [id number]  [id number]  [id number]^JANE^MARIA DOLORES           [facility]  Echocardiography Laboratory  [address] [place] [id number]     Name: SYDNEY CANO  MRN: [id number]  : 1945  Study Date: 2020 01:54 PM  Age: 74 yrs  Gender: Male  Patient [facility]: [facility]  Reason For Study: Aortic Dissection  Ordering Physician: MARIA DOLORES LARA  Performed By: Hang Mendez  Height: 66 in     HR:  62  _____________________________________________________________________________  __        Procedure  Complete Portable Echo Adult. Optison (NDC #3270-1729-03) given intravenously.  Patient was given 9 ml mixture of 3 ml Optison and 6 ml saline. 0 ml wasted.  _____________________________________________________________________________  __        Interpretation Summary  Aortic dissection with flap visualized in the proximal arch with severely  dilated ascending aorta measuring 5.7 cm.  Left ventricular function, chamber size, wall motion, and wall thickness are  normal.The EF is 55-60%.  S/P mechanical aortic valve. The aortic valve appears well-seated. Limited  Doppler interrogation of the aortic valve is normal (MG 5 mmHg.)  No pericardial effusion is present.  Previous study not available for comparison.  _____________________________________________________________________________  __        Left Ventricle  Left ventricular function, chamber size, wall motion, and wall thickness are  normal.The EF is 55-60%. Left ventricular diastolic function is not  assessable. No regional wall motion abnormalities are seen.     Right Ventricle  The right ventricle cannot be assessed.     Atria  The atria cannot be assessed.     Mitral Valve  The mitral valve cannot be assessed.     Aortic Valve  A mechanical aortic valve is present. Doppler interrogation of the aortic  valve is normal. The mean gradient is 5 mmHg.        Tricuspid Valve  The tricuspid valve cannot be assessed. The peak velocity of the tricuspid  regurgitant jet is not obtainable. Pulmonary artery systolic pressure cannot  be assessed.     Pulmonic Valve  The pulmonic valve cannot be assessed.     Vessels  Ascending aorta 5.7 cm. Aortic dissection is present involving the arch.  Dilation of the inferior vena cava is present with abnormal respiratory  variation in diameter.     Pericardium  No pericardial effusion is present.     Compared to Previous  Study  Previous study not available for comparison.     _____________________________________________________________________________  __                       Report approved by: Rena Novak 01/17/2020 03:37 PM                 _____________________________________________________________________________  __      XR Abdomen Port 1 View    Narrative    Exam:  XR ABDOMEN PORT 1 VW, 1/17/2020 10:21 PM    History: og verification    Comparison:  None    Findings:  Portable supine radiograph of the abdomen. Gastric tube  sidehole projects over the gastric antrum with tip likely in the  proximal duodenum. Epicardial pacing wires. No abnormally dilated  bowel. No pneumatosis or portal venous gas. Degenerative changes in  the spine.      Impression    Impression:    Gastric tube sidehole projects over the gastric antrum with the tip  likely in the proximal duodenum. Consider slight retraction.    I have personally reviewed the examination and initial interpretation  and I agree with the findings.    RHODA ANTHONY MD   XR Chest Port 1 View    Narrative    Exam:  XR CHEST PORT 1 VW, 1/17/2020 10:20 PM    History: respiratory failure    Comparison:  1/17/2020    Findings:  Portable supine radiograph of the chest. Endotracheal tube  tip projects approximately 4 cm above the cheyenne. Enteric tube is  collimated off the field-of-view. Median sternotomy wires. Esophageal  temperature probe projects over the midchest. Right IJ central venous  catheter projects over the high SVC. Cardiac silhouette is enlarged.  Small bilateral pleural effusions and dense retrocardiac opacities.  Mild diffuse interstitial opacities. No pneumothorax.      Impression    Impression:    1. Small bilateral pleural effusions and adjacent atelectasis and/or  consolidation.  2. Mild interstitial pulmonary edema.  3. Support devices as above.    I have personally reviewed the examination and initial interpretation  and I agree with the  findings.    RHODA ANTHONY MD   XR Chest Port 1 View    Narrative    Exam: XR CHEST PORT 1 VW, 1/18/2020 8:23 AM    Indication: respiratory failure    Comparison: Chest x-ray 1/17/2020    Findings:   Portable semiupright AP radiograph of the chest. Endotracheal tube tip  projects 4.7 cm above the cheyenne. Right IJ central venous catheter tip  projects over the high SVC. Enteric tube courses below the diaphragm.  Esophageal temperature probe tip projects near the gastroesophageal  junction. Prominent cardiac silhouette with widening of the  mediastinum and increased interstitial markings bilaterally. Unchanged  hazy retrocardiac opacities. No pneumothorax. Trace bilateral pleural  effusions.      Impression    Impression:   1. Stable position of supportive devices.  2. Stable prominent cardiac silhouette with widened mediastinum  (related to known aortic dissection) and interstitial pulmonary edema.    3. Unchanged retrocardiac atelectasis versus consolidation.    I have personally reviewed the examination and initial interpretation  and I agree with the findings.    CARRIE RAMIREZ, DO

## 2020-01-18 NOTE — PLAN OF CARE
Assessment: Aortic dissection.    Neuro: BALTAZAR orientation as patient is intubated/sedated, does follow commands, +cough reflex, NIETO. PERRLA, pupils 4 mm b/l.   CV: SR with PVCs, HR 60s (no bradycardic episodes overnight). MAPs stable. Pulses throughout all extremities.   Lytes: K 40 meq, Ca Gluc 1g, NaPhos 15 mmol replaced.   Pulm: LS coarse, CMV settings. Pt asynchronous with vent. Thick, tan secretions via ETT.  GI: Abd distended, soft. No BM this shift. OG to LIS, verified by XRAY for placement ok to use.  : UOP  ml q/2 hrs.     Skin: Generalized ruddiness, cool extremities. Patient turns extremely abbie when laying flat, does not tolerate

## 2020-01-18 NOTE — PROGRESS NOTES
CLINICAL NUTRITION SERVICES - ASSESSMENT NOTE     Nutrition Prescription    RECOMMENDATIONS FOR MDs/PROVIDERS TO ORDER:  - Total daily fluids/adjustments per MD   - Verify OGT position? Per rads, retraction recommended. Unable to verify new imaging.   - Electrolyte replacement per protocol as indicated     Malnutrition Status:    - Unable to determine due to unable to complete all parameters of NFPA (vented)    Recommendations already ordered by Registered Dietitian (RD):  - Once current access/OGT verified for use: rec Impact Peptide @ goal 60 ml/hr (1440 ml/day) to provide 2160 kcals (25 kcal/kg/day), 135 g PRO (1.6 g/kg/day), 1109 ml free H2O, 92 g Fat (50% from MCTs), 202 g CHO and no Fiber daily.  - Initiate @ 10 ml/hr and advance by 10 ml q8hr as tolerated  - Do not start or advance until lytes (Mg++,K+) WNL and phos>1.9   - Recommend 30-60 ml q4hr fluid flushes for tube patency. Additional fluids and/or adjustments per MD.    - Order multivitamin/mineral (15 ml/day via FT) to help ensure micronutrient needs being met with suspected hypermetabolic demands and potential interruptions to TF infusions.  - Order baseline CRP and weekly monitoring thereafter to assess utility of immune-modulating regimen.   - Aspiration precautions with gastric feeds    Future/Additional Recommendations:  - Access verification (OGT position)  - EN initiation/tolerance/lytes   - met cart as appro      REASON FOR ASSESSMENT  Enmanuel Hernandez is a/an 74 year old male assessed by the dietitian for Provider Order - Registered Dietitian to Assess and Order TF per Medical Nutrition Therapy Protocol    Medical History:  Admit from [facility] for further evaluation and management of abdominal aortic aneurysm with type A dissection, shock, respiratory failure, ROMI.    NUTRITION HISTORY  Pt vented and no family in room.     CURRENT NUTRITION ORDERS  Diet: NPO    LABS  Labs reviewed  Total bili: 1.8 (H)  AST: 154 (H)  ALT: 158 (H)  Glucose  "trends: 124, 127  Positive urinary ketones (5)  Lactic acid WNL    MEDICATIONS  Medications reviewed  Senokot  Phenylephrine   Propofol @ 16.2 ml/hr = 428 kcals     ANTHROPOMETRICS  Height: 177.8 cm (5' 10\") 70\"   Most Recent Weight: 84.6 kg (186 lb 8.2 oz)    IBW: 75 kg  BMI: Overweight BMI 25-29.9  Weight History:   Wt Readings from Last 9 Encounters:   01/17/20 84.6 kg (186 lb 8.2 oz)     Dosing Weight: 85 kg (current/admit weight)    ASSESSED NUTRITION NEEDS  Estimated Energy Needs: 6694-9097 kcals/day (25 - 30 kcals/kg)  Justification: Maintenance  Estimated Protein Needs: 128-170 grams protein/day (1.5 - 2 grams of pro/kg)  Justification: Increased needs  Estimated Fluid Needs: 7658-3130 mL/day (1 mL/kcal)   Justification: Maintenance and Per provider pending fluid status    PHYSICAL FINDINGS  See malnutrition section below.  Skin: shelton; dry  Hair: mostly bald c/w age and gender  OGT/LIS in place; no major output noted    Abd is round and soft - semi-firm. Per MD, improvement from yesterday.     From chart:  1/17: \"abd very distended\"  OGT to LIS (okay'd for use per RN 1/18)    MALNUTRITION  % Intake: Unable to assess  % Weight Loss: Unable to assess  Subcutaneous Fat Loss: None observed  Muscle Loss: None observed; decreased tone in LE  Fluid Accumulation/Edema: None noted  Malnutrition Diagnosis: Unable to determine due to unable to complete all parameters of NFPA (vented)    NUTRITION DIAGNOSIS  Inadequate protein-energy intake related to inability to take oral PO/vented as evidenced by pt NPO and currently meeting 0% kcal/PRO needs.       INTERVENTIONS  Implementation  Nutrition Education: Not appropriate at this time due to patient condition   Enteral Nutrition - Initiate as ordered and monitor accordingly     Goals  Total avg nutritional intake to meet a minimum of 25 kcal/kg and 1.5 g PRO/kg daily (per dosing wt  kg).     Monitoring/Evaluation  Progress toward goals will be monitored and evaluated per " protocol.     Colleen Calle RD, HELIO, [facility]  Unit Pager: [phone]

## 2020-01-18 NOTE — PHARMACY-VANCOMYCIN DOSING SERVICE
Pharmacy Vancomycin Initial Note  Date of Service 2020  Patient's  1945  74 year old, male    Indication: Sepsis    Current estimated CrCl = Estimated Creatinine Clearance: 55.7 mL/min (A) (based on SCr of 1.35 mg/dL (H)).    Creatinine for last 3 days  2020: 12:26 PM Creatinine 1.35 mg/dL    Recent Vancomycin Level(s) for last 3 days  No results found for requested labs within last 72 hours.      Vancomycin IV Administrations (past 72 hours)      No vancomycin orders with administrations in past 72 hours.                Nephrotoxins and other renal medications (From now, onward)    Start     Dose/Rate Route Frequency Ordered Stop    20 1900  vancomycin 1500 mg in 0.9% NaCl 250 ml intermittent infusion 1,500 mg      1,500 mg  over 90 Minutes Intravenous EVERY 24 HOURS 20 1802      20 1800  vancomycin (VANCOCIN) 2,000 mg in sodium chloride 0.9 % 250 mL intermittent infusion      2,000 mg (central catheter)  over 60 Minutes Intravenous ONCE 20 1800      20 1745  piperacillin-tazobactam (ZOSYN) 3.375 g vial to attach to  mL bag      3.375 g  over 30 Minutes Intravenous EVERY 6 HOURS 20 1735      20 1230  phenylephrine (DARREL-SYNEPHRINE) 50 mg in sodium chloride 0.9 % 250 mL infusion      0.5-6 mcg/kg/min × 90 kg (Order-Specific)  13.5-162 mL/hr  Intravenous CONTINUOUS 20 1230            Contrast Orders - past 72 hours (72h ago, onward)    Start     Dose/Rate Route Frequency Ordered Stop    20 1451  perflutren diluted 1mL to 2mL with saline (OPTISON) diluted injection 4 mL      4 mL Intravenous ONCE 20 1450 20 1451                Plan:  1.  Start vancomycin  2000 mg IV x 1 [23.4 mg/kg] then 1500 mg iv q24h [17.7 mg/kg]  2.  Goal Trough Level: 15-20 mg/L   3.  Pharmacy will check trough levels as appropriate in 1-3 Days.    4. Serum creatinine levels will be ordered daily for the first week of therapy and at least twice  weekly for subsequent weeks.    5. Royal method utilized to dose vancomycin therapy: Method 2    Cricket BestD

## 2020-01-18 NOTE — PROVIDER NOTIFICATION
Notified CVTS resident of ABG post vent changes. No further orders received. Will continue to monitor.

## 2020-01-18 NOTE — PROVIDER NOTIFICATION
Notified MD of MD BALTAZAR ok with turning pt's vent to PEEP of 5. Respiratory notified of requested change.

## 2020-01-18 NOTE — PROGRESS NOTES
Patient was seen and examined by me.  Better than yesterday.  Responsive to nurses and family.  Hemodynamically stable on low dose Neosynephrine.  Treating presumed pneumonia.  Chest x-ray is a little better today.  Oxygenation is still borderline.  Increased the PEEP.  Creatinine is 1.08 and lactate is normal.  Abdomen is less distended than yesterday.  Nutrition has been consulted.  I spent about 20 minutes with the family answering questions.

## 2020-01-18 NOTE — PHARMACY-CONSULT NOTE
Pharmacy Tube Feeding Consult    Medication reviewed for administration by feeding tube and for potential food/drug interactions.    Recommendation: No changes are needed at this time.     Pharmacy will continue to follow as new medications are ordered.    Cricket RiveraD  CVICU and Advanced Heart Failure Pharmacist  Pager [phone]

## 2020-01-19 NOTE — PROCEDURES
Bronchoscopy Procedure Note - 1/19/2020   Enmanuel Hernandez  MR: [id number]    Indications: Respiratory Failure   Specimens: RML  Findings:   1. Sharp cheyenne  2. Erythematous mucosa throughout bronchial tree without significant edema  3. Scant, thin, white mucus b/l R>L with mild clear secretions throughout.  4. Tracheal curvature from tip of ETT to cheyenne.  5. Hyperactive bronchoconstriction to stimulation  Complications: no immediate complications   An adult flexible bronchoscope was advanced thru the adapter on the ET Tube without difficulty. The ET Tube was seen approximately 3 cm above the cheyenne. There were no obstructions evident.  A Leukens trap was utilized to catch the specimens which were sent to Microbiology for Acid Fast, Fungal and bacterial cultures.   The patient tolerated the procedure well and there were no immediate complications.       Dr. Cameron was present throughout the procedure.    Rashel Goldberg Jr., MD  Surgical ICU Fellow  Pager: [phone]  1/19/2020  11:27 AM

## 2020-01-19 NOTE — PLAN OF CARE
Major Shift Events: No acute events overnight. HR has ranged from 50s-80s with frequent ectopy despite electrolyte replacement. Patient received 10mg IV lasix x2 for low urine output in the setting of elevated CVPs. Patient arouses to voice and inconsistently following commands. Continues on propofol at 25, fentanyl at 25, heparin at 1080, and ron at 0.5  Plan: Continue pressure support trials. Continue to monitor & update primary team with changes.  For vital signs and complete assessments, please see documentation flowsheets.

## 2020-01-19 NOTE — PROGRESS NOTES
CVTS PROGRESS NOTE  1/19/2020  Enmanuel Hernandez  [id number]  Admitted: 1/17/2020 12:21 PM      CO-MORBIDITIES:   Dissection of aorta, unspecified portion of aorta (H)    ASSESSMENT: Enmanuel Hernandez is a 74 year old male who was admitted for aortic dissection. Patient transferred from [place] 1/16/20 and then transferred to [facility] in [place]. Air lifted to [facility] 1/17/20 where patient was found to be a prohibitively high risk of operation given his history of AVR. Will medically manage and optimize to reassess risk of surgery.  Discussed code status with wife, patient does not want Chest compressions but ok with having an ETT.     TODAY'S PROGRESS:   -Keep HR <60 and SBP <100  -Labs q12  -Wean sedation and PS today  -Lasix to CVP < 12  -Bronch today    PLAN:  Neurological:  # Intubated requiring sedation  - Monitor neurological status. Delirium preventions and precautions.   - Pain: fent gtt.                          - Sedation plan: Prop gtt.      Pulmonary:   #Acute hypoxic respiratory failure   - Ventilatory bundle. HOB elevation.  - Incentive spirometer every 15- 30 minutes when extubated     Cardiovascular:    #History of AVR  #Ascending aortic dissection  - Monitor hemodynamic status.   - Wean pressors as able  - Monitor pulses in all 4 extremities.   - SBP <100 mmHg  - HR <60 bpm  - CVP 8-12  - Phenylephrine gtt.   - Monitor CVP  - Monitor via flotrak  - Lasix     Gastroenterology/Nutrition:  - NPO for now  - ADAT after extubation   - Trend lactic acid.   - Neutra-phos     Electrolytes/Renal:  - Urine output is adequate. Will continue to monitor intake and output.  - ayala catheter  - Trend BMP      Endocrine:  # Hyperglycemia  - Insulin gtt.   - Goal to keep BG< 180 mg/dL       ID:  - Started broad spectrum.   - Sent cultures.      Heme:     -Transfuse if hgb <7.0 or signs/symptoms of hypoperfusion.   -Heparin gtt        Skin:  - No skin issues  - Diligent skin cares     General Cares/Prophylaxis:    DVT  Prophylaxis: Heparin gtt  GI Prophylaxis: PPI  Restraints: Restraints for medical healing needed: NO     Lines/ tubes/ drains:  - ETT, PIV, A.line, PHANI ayala CVC    Disposition: CV ICU    Patient seen, findings and plan discussed with CVTS Fellow    Rashel Goldberg Jr., MD  Surgical ICU Fellow  Pager: [phone]  1/19/2020  7:40 AM    ====================================    TODAY'S PROGRESS:   SUBJECTIVE:   NAEO.  Good response to lasix.         OBJECTIVE:   1. VITAL SIGNS:   Temp:  [98.1  F (36.7  C)-99.3  F (37.4  C)] 98.2  F (36.8  C)  Heart Rate:  [53-92] 56  Resp:  [11-21] 11  MAP:  [57 mmHg-91 mmHg] 68 mmHg  Arterial Line BP: ()/(48-80) 94/54  FiO2 (%):  [40 %-50 %] 40 %  SpO2:  [93 %-100 %] 96 %  Ventilation Mode: CMV/AC  (Continuous Mandatory Ventilation/ Assist Control)  FiO2 (%): 40 %  Rate Set (breaths/minute): 18 breaths/min  Tidal Volume Set (mL): 500 mL  PEEP (cm H2O): 8 cmH2O  Oxygen Concentration (%): 40 %  Resp: 11      2. INTAKE/ OUTPUT:   I/O last 3 completed shifts:  In: 2554.66 [I.V.:2119.66; NG/GT:165]  Out: 1339 [Urine:1339]    3. PHYSICAL EXAMINATION:   General: NAD, supine  HEENT: moist mucous membranes   Neuro: Sedated, follows commands  Pulm/Resp: mechanically ventilated, breathing non-labored  CV: regular rate, regular rhythm  Abdomen: Soft, non-distended  : ayala catheter in place  Incisions/Skin: dressings clean, dry, intact  MSK/Extremities: peripheral pulses intact, extremities well perfused      4. INVESTIGATIONS:   Arterial Blood Gases   Recent Labs   Lab 01/19/20  0330 01/18/20  1644 01/18/20  1423 01/18/20  1256   PH 7.34* 7.36 7.37 7.36   PCO2 41 37 33* 34*   PO2 79* 73* 78* 76*   HCO3 22 21 19* 19*     Complete Blood Count   Recent Labs   Lab 01/19/20  0329 01/18/20  1644 01/18/20  1101 01/18/20  0416   WBC 17.4* 18.0* 19.2* 21.0*   HGB 15.9 16.4 16.3 17.4   * 141* 149* 160     Basic Metabolic Panel  Recent Labs   Lab 01/19/20  0329 01/18/20  2159 01/18/20  1644  01/18/20  0416    146* 145* 144   POTASSIUM 4.0 3.8 3.7 3.8   CHLORIDE 118* 120* 118* 117*   CO2 21 22 23 20   BUN 24 23 22 28   CR 0.86 0.85 0.92 1.08   * 104* 109* 127*     Liver Function Tests  Recent Labs   Lab 01/19/20  0329 01/18/20  0416 01/17/20  1952 01/17/20  1812 01/17/20  1639 01/17/20  1226   AST 87* 154* 191* 209*  --  210*   * 158* 164* 174*  --  169*   ALKPHOS 49 44 47 49  --  48   BILITOTAL 1.6* 1.8* 1.8* 1.9*  --  2.2*   ALBUMIN 2.8* 3.1* 3.2* 3.4  --  3.7   INR 1.38* 1.44*  --   --  1.47* 1.45*     Pancreatic Enzymes  No lab results found in last 7 days.  Coagulation Profile  Recent Labs   Lab 01/19/20  0329 01/18/20 0416 01/17/20  1639 01/17/20  1226   INR 1.38* 1.44* 1.47* 1.45*   PTT 64* 101*  --  39*     Lactate  Invalid input(s): LACTATE    5. RADIOLOGY:   Recent Results (from the past 24 hour(s))   XR Chest Port 1 View    Narrative    Exam: XR CHEST PORT 1 VW, 1/18/2020 8:23 AM    Indication: respiratory failure    Comparison: Chest x-ray 1/17/2020    Findings:   Portable semiupright AP radiograph of the chest. Endotracheal tube tip  projects 4.7 cm above the [place]. Right IJ central venous catheter tip  projects over the high SVC. Enteric tube courses below the diaphragm.  Esophageal temperature probe tip projects near the gastroesophageal  junction. Prominent cardiac silhouette with widening of the  mediastinum and increased interstitial markings bilaterally. Unchanged  hazy retrocardiac opacities. No pneumothorax. Trace bilateral pleural  effusions.      Impression    Impression:   1. Stable position of supportive devices.  2. Stable prominent cardiac silhouette with widened mediastinum  (related to known aortic dissection) and interstitial pulmonary edema.    3. Unchanged retrocardiac atelectasis versus consolidation.    I have personally reviewed the examination and initial interpretation  and I agree with the findings.    CARRIE RAMIREZ, DO        =========================================

## 2020-01-19 NOTE — PROVIDER NOTIFICATION
CVTS fellow Ximena at bedside and notified of nights events and current labs, MD would like an EKG and labs BID. Nurse placed order for labs.

## 2020-01-19 NOTE — PROGRESS NOTES
CV ICU PROGRESS NOTE  1/19/2020  Enmanuel Hernandez  [id number]  Admitted: 1/17/2020 12:21 PM      CO-MORBIDITIES:   Dissection of aorta, unspecified portion of aorta (H)    ASSESSMENT: Enmanuel Hernandez is a 74 year old male who was admitted for aortic dissection. Patient transferred from [place] 1/16/20 and then transferred to [facility] in [place]. Air lifted to [facility] 1/17/20 where patient was found to be a prohibitively high risk of operation given his history of AVR. Will medically manage and optimize to reassess risk of surgery.  Discussed code status with wife, patient does not want Chest compressions but ok with having an ETT.     TODAY'S PROGRESS:   -Keep HR <60 and SBP <100  -Labs q12  -Wean sedation and PS today  -Lasix to CVP < 12  -Bronch today    PLAN:  Neurological:  # Intubated requiring sedation  - Monitor neurological status. Delirium preventions and precautions.   - Pain: fent gtt.                          - Sedation plan: Prop gtt.      Pulmonary:   #Acute hypoxic respiratory failure   - Ventilatory bundle. HOB elevation.  - Incentive spirometer every 15- 30 minutes when extubated     Cardiovascular:    #History of AVR  #Ascending aortic dissection  - Monitor hemodynamic status.   - Wean pressors as able  - Monitor pulses in all 4 extremities.   - SBP <100 mmHg  - HR <60 bpm  - CVP 8-12  - Phenylephrine gtt.   - Monitor CVP  - Monitor via flotrak  - Lasix     Gastroenterology/Nutrition:  - NPO for now  - ADAT after extubation   - Trend lactic acid.   - Neutra-phos     Electrolytes/Renal:  - Urine output is adequate. Will continue to monitor intake and output.  - ayala catheter  - Trend BMP      Endocrine:  # Hyperglycemia  - Insulin gtt.   - Goal to keep BG< 180 mg/dL       ID:  - Started broad spectrum.   - Sent cultures.      Heme:     -Transfuse if hgb <7.0 or signs/symptoms of hypoperfusion.   -Heparin gtt        Skin:  - No skin issues  - Diligent skin cares     General Cares/Prophylaxis:     DVT Prophylaxis: Heparin gtt  GI Prophylaxis: PPI  Restraints: Restraints for medical healing needed: NO     Lines/ tubes/ drains:  - ETT, PIV, A.line, PHANI ayala CVC    Disposition: CV ICU    Patient seen, findings and plan discussed with CV ICU staff Intensivist Dr. Cameron.    Rashel Goldberg Jr., MD  Surgical ICU Fellow  Pager: [phone]  1/19/2020  7:40 AM    ====================================    TODAY'S PROGRESS:   SUBJECTIVE:   NAEO.  Good response to lasix.         OBJECTIVE:   1. VITAL SIGNS:   Temp:  [98.1  F (36.7  C)-99.3  F (37.4  C)] 98.3  F (36.8  C)  Heart Rate:  [53-92] 61  Resp:  [14-21] 21  MAP:  [57 mmHg-91 mmHg] 70 mmHg  Arterial Line BP: ()/(48-80) 94/56  FiO2 (%):  [40 %-50 %] 40 %  SpO2:  [93 %-100 %] 95 %  Ventilation Mode: CMV/AC  (Continuous Mandatory Ventilation/ Assist Control)  FiO2 (%): 40 %  Rate Set (breaths/minute): 18 breaths/min  Tidal Volume Set (mL): 500 mL  PEEP (cm H2O): 8 cmH2O  Oxygen Concentration (%): 40 %  Resp: 21      2. INTAKE/ OUTPUT:   I/O last 3 completed shifts:  In: 2554.66 [I.V.:2119.66; NG/GT:165]  Out: 1339 [Urine:1339]    3. PHYSICAL EXAMINATION:   General: NAD, supine  HEENT: moist mucous membranes   Neuro: Sedated, follows commands  Pulm/Resp: mechanically ventilated, breathing non-labored  CV: regular rate, regular rhythm  Abdomen: Soft, non-distended  : ayala catheter in place  Incisions/Skin: dressings clean, dry, intact  MSK/Extremities: peripheral pulses intact, extremities well perfused      4. INVESTIGATIONS:   Arterial Blood Gases   Recent Labs   Lab 01/19/20  0330 01/18/20  1644 01/18/20  1423 01/18/20  1256   PH 7.34* 7.36 7.37 7.36   PCO2 41 37 33* 34*   PO2 79* 73* 78* 76*   HCO3 22 21 19* 19*     Complete Blood Count   Recent Labs   Lab 01/19/20  0329 01/18/20  1644 01/18/20  1101 01/18/20  0416   WBC 17.4* 18.0* 19.2* 21.0*   HGB 15.9 16.4 16.3 17.4   * 141* 149* 160     Basic Metabolic Panel  Recent Labs   Lab 01/19/20  0322  01/18/20  2159 01/18/20  1644 01/18/20  0416    146* 145* 144   POTASSIUM 4.0 3.8 3.7 3.8   CHLORIDE 118* 120* 118* 117*   CO2 21 22 23 20   BUN 24 23 22 28   CR 0.86 0.85 0.92 1.08   * 104* 109* 127*     Liver Function Tests  Recent Labs   Lab 01/19/20  0329 01/18/20  0416 01/17/20 1952 01/17/20  1812 01/17/20  1639 01/17/20  1226   AST 87* 154* 191* 209*  --  210*   * 158* 164* 174*  --  169*   ALKPHOS 49 44 47 49  --  48   BILITOTAL 1.6* 1.8* 1.8* 1.9*  --  2.2*   ALBUMIN 2.8* 3.1* 3.2* 3.4  --  3.7   INR 1.38* 1.44*  --   --  1.47* 1.45*     Pancreatic Enzymes  No lab results found in last 7 days.  Coagulation Profile  Recent Labs   Lab 01/19/20 0329 01/18/20 0416 01/17/20  1639 01/17/20  1226   INR 1.38* 1.44* 1.47* 1.45*   PTT 64* 101*  --  39*     Lactate  Invalid input(s): LACTATE    5. RADIOLOGY:   Recent Results (from the past 24 hour(s))   XR Chest Port 1 View    Narrative    Exam: XR CHEST PORT 1 VW, 1/18/2020 8:23 AM    Indication: respiratory failure    Comparison: Chest x-ray 1/17/2020    Findings:   Portable semiupright AP radiograph of the chest. Endotracheal tube tip  projects 4.7 cm above the [place]. Right IJ central venous catheter tip  projects over the high SVC. Enteric tube courses below the diaphragm.  Esophageal temperature probe tip projects near the gastroesophageal  junction. Prominent cardiac silhouette with widening of the  mediastinum and increased interstitial markings bilaterally. Unchanged  hazy retrocardiac opacities. No pneumothorax. Trace bilateral pleural  effusions.      Impression    Impression:   1. Stable position of supportive devices.  2. Stable prominent cardiac silhouette with widened mediastinum  (related to known aortic dissection) and interstitial pulmonary edema.    3. Unchanged retrocardiac atelectasis versus consolidation.    I have personally reviewed the examination and initial interpretation  and I agree with the findings.    CARRIE CORONA  SCHAT, DO       =========================================

## 2020-01-19 NOTE — PLAN OF CARE
VSS. Pt on ron most of the day to keep MAP>65. Pt's vent settings titrated down per ABG results. Small-moderate amount of thick secretions coughed up by pt in to ETT. A lot of family at bedside today, family very emotional and needing a lot of education on the situation and status of pt. Surgeon to be by tomorrow to talk with pt more. Goal is to eventually extubate and talk to pt about whether he wants surgery or not. Flotrack in place, number as charted. Serial labs per MD order. Good urine output via ayala. Continue per POC.

## 2020-01-19 NOTE — PLAN OF CARE
OT/4E: Evaluation orders received. Per RN, pt medically not appropriate for therapy this date, will cancel today and reschedule for tomorrow as appropriate.

## 2020-01-19 NOTE — PROGRESS NOTES
"Bronchoscopy Risk Assessment Guidelines      A. Patient symptoms to consider when assessing pulmonary TB risk are:    I. Cough greater than 3 weeks; and fever, hemoptysis, pleuritic chest    pain, weight loss greater than 10 lbs, night sweats, fatigue, infiltrates on    upper lobes or superior segments of lower lobes, cavitation on chest    x-ray.   B. Patient risk factors to consider when assessing pulmonary TB risk are:    I. Exposure to known TB case, foreign-born persons (within 5 years of    arrival to [place]), residence in a crowded setting (correctional facility,     long-term care center, etc.), persons with HIV or immunosuppression.    Patients with symptoms and risk factors should generally be considered \"suspect risk\" and bronchoscopies should be performed in airborne precautions.    This patient has NO KNOWN RISK of Tuberculosis (proceed with bronchoscopy)    Specimens sent: yes  Complications: None  Scope used: #[id number] Adult  Attending Physician: Dr. Nisha CROWE, RT on 1/19/2020 at 11:48 AM    Rashel Goldberg Jr., MD  Surgical ICU Fellow  Pager: [phone]  1/19/2020  12:32 PM    "

## 2020-01-19 NOTE — PLAN OF CARE
PT-4E- Cancel and Hold, PT Consult Order received, per chart review and communication with interdisciplinary care team, pt is intubated and sedated, and not appropriate for PT this date. PT will hold evaluation, and initiate as orders indicate and medical status permits. OT will initiate and follow for PROM as indicated.

## 2020-01-20 PROBLEM — I71.21 ASCENDING AORTIC ANEURYSM (H): Status: ACTIVE | Noted: 2020-01-01

## 2020-01-20 PROBLEM — I50.9 HEART FAILURE (H): Status: ACTIVE | Noted: 2020-01-01

## 2020-01-20 NOTE — PROGRESS NOTES
Pt extubated by RT at 1205. Placed on oxi mask. Pt tolerated well. OG removed. Family aware. Capnography NC placed on pt under oxiplus mask

## 2020-01-20 NOTE — PROGRESS NOTES
[facility], [place]     Extubation Procedure Note     Patient extubated at: January 20, 2020, 2:15 PM   Supplemental Oxygen: Via face mask at 5 liters per minute   Cough: The cough is good   Secretion Mode: Able to clear   Secretion Amount: Moderate amount, moderately thick and yellow in color   Respiratory Exam:: Breath sounds: diminished     Location: bilaterally   Skin Exam:: Patient color: natural   Patient Status: Currently appears comfortable   Arterial Blood Gasses: pH Arterial (pH)   Date Value   01/20/2020 7.44     pO2 Arterial (mm Hg)   Date Value   01/20/2020 75 (L)     pCO2 Arterial (mm Hg)   Date Value   01/20/2020 31 (L)     Bicarbonate Arterial (mmol/L)   Date Value   01/20/2020 21            Recorded by Tc Rosenberg

## 2020-01-20 NOTE — PLAN OF CARE
Major Shift Events:  pt pressure supported on vent x2 for 30 min each. Lasix given per MD order due to elevated CVP and CXR results. Good response after 20 mg dose (se MAR and I&O flow sheet). Labs replaced per order. Pt had a bronch today, specimens sent, since bronch pt needing ron to be titrated up and down to keep MAP above 65 and SBP below 100 (see MAR). TF increased to 40ml, minimal residual from OG. Flow track numbers as charted. Possible surgery Wednesday but pt needs to be extubate and participate in conversation with surgeon about pending surgery. Family at bedside throughout the day today. Wife only wants family to visit at this time. A friend of pt called writer and asked if pt was taking visitors, writer checked with wife and that's when wife said she would prefer just family visiting and would like to keep visitors to a minimum, wife notified that staff can ask people to leave, if needed.    Plan: goal CVP 8-12 per MD. Pressure support tomorrow and extubated if ready. Otherwise continue to monitor labs and replace as needed.    For vital signs and complete assessments, please see documentation flowsheets.

## 2020-01-20 NOTE — PROGRESS NOTES
Social Work Services Progress Note    Hospital Day: 3  Date of Initial Social Work Evaluation:  Not yet completed   Collaborated with:  Pt's wife and pt's daughter at bedside     Data:  Pt is a 74 year old male with h/o AVR on chronic anticoagulation who was admitted for type A aortic dissection. Patient transferred from [place] 1/16/20 and then transferred to [facility] in [place]. Air lifted to [facility] 1/17/20 where patient was found to be a prohibitively high risk of operation by CVTS given his history of AVR, hypoxemia and metabolic derangements . Will medically manage and optimize to reassess risk of surgery. His code status is special with no chest compressions.    Intervention:  SW met with pt's wife and daughter (Kyung) at bedside and introduced self and role. They reported that they would like SW to have additional family members contact information. Pt's wife/daughter did not have any other questions at this time.     Silvia (Wife) - [phone]  Waqas (daughter) - [phone]  Kyung (daughter)  -[phone]  Damon (brother) - [phone]  Seamus (Nephew, Damon's son) - [phone]    Assessment:  Pt meet medical criteria for inpatient hospitalization     Plan:    Anticipated Disposition:  TBD     Barriers to d/c plan:  Medical stability     Follow Up:  SW to follow as needed    SEDRICK Saenz, [facility]  Acute Care Float   [facility]  Pager: [phone]

## 2020-01-20 NOTE — PROGRESS NOTES
CV ICU PROGRESS NOTE  1/20/2020  Enmanuel Hernandez  [id number]  Admitted: 1/17/2020 12:21 PM      CO-MORBIDITIES:   Dissection of aorta, unspecified portion of aorta (H)    ASSESSMENT: Enmanuel Hernandez is a 74 year old male who was admitted for aortic dissection. Patient transferred from [place] 1/16/20 and then transferred to [facility] in [place]. Air lifted to [facility] 1/17/20 where patient was found to be a prohibitively high risk of operation given his history of AVR. Will medically manage and optimize to reassess risk of surgery.  Discussed code status with wife, patient does not want Chest compressions but ok with having an ETT.  Tolerating PS this AM, extubated 1/20.  CVTS plan to discuss surgery option with patient this afternoon.  Sedation discontinued.  Esmolol started for hypertension.  Bronchial cultures positive for haemophilus and strep pneumonia, patient narrowed to ceftriaxone.    TODAY'S PROGRESS:   -Extubate  -Discuss surgical options with patient    PLAN:  Neurological:  No indication for analgesia or sedation at this time     Pulmonary:   #Acute Hypoxia s/p extubation  #Strep pneumonia with superimposed haemophilus influenza   - Start ceftriaxone   - Encourage Incentive spirometer every 15- 30 minutes   - Mucomyst nebs     Cardiovascular:    #History of AVR  #History of Aortic dissection s/p repair with new type A ascending aortic dissection   - Titrate esmolol to maintain SBP <100 mmHg & HR < 60 bpm   - Diuresis to CVP goal 8-12   - Monitor via flotrak     Gastroenterology/Nutrition:  - NPO for now  - Trend lactic acid.   - Neutra-phos     Electrolytes/Renal:  #Hypervolemia   - Strict I&Os, ayala to remain   - Trend creatinine   - Diuresis to CVP goal 8-12     Endocrine:  # Hyperglycemia   - SSI    - Goal to keep BG< 180 mg/dL       ID:  #Strep pneumonia with superimposed haemophilus influenza   - Start ceftriaxone through 1/25   - Discontinue Vancomycin and zosyn     Heme:      -Transfuse if hgb  <7.0 or signs/symptoms of hypoperfusion.    -Heparin gtt          General Cares/Prophylaxis:    DVT Prophylaxis: Heparin gtt  GI Prophylaxis: PPI       Lines/ tubes/ drains:  - ETT, PIV, A.line, PHANI ayala CVC    Disposition: CV ICU    Patient seen, findings and plan discussed with CV ICU staff Intensivist Dr. Danilo Goldberg Jr., MD  Surgical ICU Fellow  Pager: [phone]  1/20/2020  1:48 PM      ====================================    TODAY'S PROGRESS:   SUBJECTIVE:   NAEO.  Tolerating pressure support in AM.  Received lasix overnight for net positive fluid balance.       OBJECTIVE:   1. VITAL SIGNS:   Temp:  [97.7  F (36.5  C)-99.5  F (37.5  C)] 98.9  F (37.2  C)  Heart Rate:  [51-86] 73  Resp:  [12-20] 20  BP: (106-133)/(57-58) 133/57  MAP:  [59 mmHg-97 mmHg] 68 mmHg  Arterial Line BP: ()/(47-84) 90/52  FiO2 (%):  [40 %] 40 %  SpO2:  [92 %-100 %] 96 %  Ventilation Mode: (S) CPAP/PS  (Continuous positive airway pressure with Pressure Support)  FiO2 (%): 40 %  Rate Set (breaths/minute): 14 breaths/min  Tidal Volume Set (mL): 500 mL  PEEP (cm H2O): 8 cmH2O  Pressure Support (cm H2O): 7 cmH2O  Oxygen Concentration (%): 40 %  Resp: 20      2. INTAKE/ OUTPUT:   I/O last 3 completed shifts:  In: 3871.09 [I.V.:2411.09; NG/GT:560]  Out: 2440 [Urine:2440]    3. PHYSICAL EXAMINATION:   General: NAD, supine  HEENT: moist mucous membranes   Neuro: Moving all extremities, following commands  Pulm/Resp: Breathing comfortably on supplemental O2  CV: RRR  Abdomen: Soft, non-distended, non-tender  : ayala catheter in place  MSK/Extremities: peripheral pulses intact, extremities well perfused      4. INVESTIGATIONS:   Arterial Blood Gases   Recent Labs   Lab 01/20/20  1239 01/20/20  1027 01/20/20  0726 01/20/20  0424   PH 7.44 7.40 7.43 7.39   PCO2 31* 39 35 42   PO2 75* 74* 60* 70*   HCO3 21 24 24 25     Complete Blood Count   Recent Labs   Lab 01/20/20  0424 01/19/20  1603 01/19/20  0329 01/18/20  1644   WBC  12.5* 15.2* 17.4* 18.0*   HGB 14.6 15.5 15.9 16.4   * 135* 141* 141*     Basic Metabolic Panel  Recent Labs   Lab 01/20/20  1239 01/20/20  0424 01/19/20  1603 01/19/20  0329 01/18/20  2159   NA  --  146* 146* 144 146*   POTASSIUM 2.6* 3.4 3.6 4.0 3.8   CHLORIDE  --  117* 119* 118* 120*   CO2  --  26 25 21 22   BUN  --  27 25 24 23   CR  --  0.86 0.88 0.86 0.85   GLC  --  103* 119* 124* 104*     Liver Function Tests  Recent Labs   Lab 01/20/20  0424 01/19/20  0329 01/18/20  0416 01/17/20  1952 01/17/20  1812 01/17/20  1639   AST  --  87* 154* 191* 209*  --    ALT  --  144* 158* 164* 174*  --    ALKPHOS  --  49 44 47 49  --    BILITOTAL  --  1.6* 1.8* 1.8* 1.9*  --    ALBUMIN  --  2.8* 3.1* 3.2* 3.4  --    INR 1.33* 1.38* 1.44*  --   --  1.47*     Pancreatic Enzymes  No lab results found in last 7 days.  Coagulation Profile  Recent Labs   Lab 01/20/20  0424 01/19/20  0329 01/18/20  0416 01/17/20  1639 01/17/20  1226   INR 1.33* 1.38* 1.44* 1.47* 1.45*   PTT 56* 64* 101*  --  39*     Lactate  Invalid input(s): LACTATE    5. RADIOLOGY:   Recent Results (from the past 24 hour(s))   XR Chest Port 1 View    Narrative    Exam: XR CHEST PORT 1 VW, 1/18/2020 8:23 AM    Indication: respiratory failure    Comparison: Chest x-ray 1/17/2020    Findings:   Portable semiupright AP radiograph of the chest. Endotracheal tube tip  projects 4.7 cm above the [place]. Right IJ central venous catheter tip  projects over the high SVC. Enteric tube courses below the diaphragm.  Esophageal temperature probe tip projects near the gastroesophageal  junction. Prominent cardiac silhouette with widening of the  mediastinum and increased interstitial markings bilaterally. Unchanged  hazy retrocardiac opacities. No pneumothorax. Trace bilateral pleural  effusions.      Impression    Impression:   1. Stable position of supportive devices.  2. Stable prominent cardiac silhouette with widened mediastinum  (related to known aortic dissection) and  interstitial pulmonary edema.    3. Unchanged retrocardiac atelectasis versus consolidation.    I have personally reviewed the examination and initial interpretation  and I agree with the findings.    CARRIE RAMIREZ, DO       =========================================

## 2020-01-20 NOTE — PROGRESS NOTES
Care Coordinator Progress Note    Admission Date/Time:  1/17/2020  Attending MD:  Dagmar Gutierrez MD    Data  Chart reviewed, discussed with interdisciplinary team.   Patient was admitted for: Dissection of aorta, unspecified portion of aorta (H).    Concerns with insurance coverage for discharge needs: None.  Current Living Situation: Patient lives with spouse.  Support System: Supportive and Involved  Services Involved: None  Transportation at Discharge: Family or friend will provide  Transportation to Medical Appointments:   - Not applicable  Barriers to Discharge: Medical stability.        Assessment  Pt admitted for aortic dissection. Patient transferred from [place] 1/16/20 and then transferred to [facility] in [place]. Air lifted to [facility] 1/17/20 where patient was found to be a prohibitively high risk of operation given his history of AVR. Will medically manage and optimize to reassess risk of surgery.  Pt is in ICU vented and doing PS.    Visited pt and spouse to introduce RNCC role and for support.  RNCC role discussed and contact info provided.  Pt is vented and visited only with pt spouse.  Pt spouse stated the team have been updating them well about the plan of care.  Per discussion with pt spouse, pt was active prior hospitalization.  Pt and spouse lives in [place], have two kids who lives nataliia the [facility]. Pt spouse is staying locally.  RNCC will cont to follow plan of care.    Plan  Anticipated Discharge Date:  TBD.  Anticipated Discharge Plan:   TBD.  RNCC will cont to follow plan of care.      Lulu Fofana RN, PHN, BSN  4A and 4E/ ICU  Care Coordinator  Phone: [phone]  Pager: [phone]

## 2020-01-20 NOTE — PROVIDER NOTIFICATION
Notified CVTS MD Godinez of ABG results after being on pressure support via vent for an hour. MD would like pt to go back on CMV settings.

## 2020-01-20 NOTE — PROGRESS NOTES
CVTS PROGRESS NOTE  1/20/2020  Enmanuel Hernandez  [id number]  Admitted: 1/17/2020 12:21 PM      CO-MORBIDITIES:   Dissection of aorta, unspecified portion of aorta (H)    ASSESSMENT: Enmanuel Hernandez is a 74 year old male who was admitted for aortic dissection. Patient transferred from [place] 1/16/20 and then transferred to [facility] in [place]. Air lifted to [facility] 1/17/20 where patient was found to be a prohibitively high risk of operation given his history of AVR. Will medically manage and optimize to reassess risk of surgery.  Discussed code status with wife, patient does not want Chest compressions but ok with having an ETT.  Tolerating PS this AM, extubated 1/20.  CVTS plan to discuss surgery option with patient this afternoon.  Sedation discontinued.  Esmolol started for hypertension.  Bronchial cultures positive for haemophilus and strep pneumonia, patient narrowed to ceftriaxone.    TODAY'S PROGRESS:   -Extubate  -Discuss surgical options with patient    PLAN:  Neurological:  No indication for analgesia or sedation at this time     Pulmonary:   #Acute Hypoxia s/p extubation  #Strep pneumonia with superimposed haemophilus influenza   - Start ceftriaxone   - Encourage Incentive spirometer every 15- 30 minutes   - Mucomyst nebs     Cardiovascular:    #History of AVR  #History of Aortic dissection s/p repair with new type A ascending aortic dissection   - Titrate esmolol to maintain SBP <100 mmHg & HR < 60 bpm   - Diuresis to CVP goal 8-12   - Monitor via flotrak     Gastroenterology/Nutrition:  - NPO for now  - Trend lactic acid.   - Neutra-phos     Electrolytes/Renal:  #Hypervolemia   - Strict I&Os, ayala to remain   - Trend creatinine   - Diuresis to CVP goal 8-12     Endocrine:  # Hyperglycemia   - SSI    - Goal to keep BG< 180 mg/dL       ID:  #Strep pneumonia with superimposed haemophilus influenza   - Start ceftriaxone through 1/25   - Discontinue Vancomycin and zosyn     Heme:      -Transfuse if hgb  <7.0 or signs/symptoms of hypoperfusion.    -Heparin gtt          General Cares/Prophylaxis:    DVT Prophylaxis: Heparin gtt  GI Prophylaxis: PPI       Lines/ tubes/ drains:  - ETT, PIV, A.line, PHANI ayala CVC    Disposition: CV ICU    Patient seen, findings and plan discussed with CVTS Fellow    Rashel Goldberg Jr., MD  Surgical ICU Fellow  Pager: [phone]  1/20/2020  1:48 PM      ====================================    TODAY'S PROGRESS:   SUBJECTIVE:   NAEO.  Tolerating pressure support in AM.  Received lasix overnight for net positive fluid balance.       OBJECTIVE:   1. VITAL SIGNS:   Temp:  [97.7  F (36.5  C)-99.5  F (37.5  C)] 98.9  F (37.2  C)  Heart Rate:  [51-86] 77  Resp:  [12-25] 25  BP: (106-133)/(57-58) 133/57  MAP:  [59 mmHg-97 mmHg] 73 mmHg  Arterial Line BP: ()/(47-84) 91/57  FiO2 (%):  [40 %] 40 %  SpO2:  [92 %-100 %] 97 %  Ventilation Mode: (S) CPAP/PS  (Continuous positive airway pressure with Pressure Support)  FiO2 (%): 40 %  Rate Set (breaths/minute): 14 breaths/min  Tidal Volume Set (mL): 500 mL  PEEP (cm H2O): 8 cmH2O  Pressure Support (cm H2O): 7 cmH2O  Oxygen Concentration (%): 40 %  Resp: 25      2. INTAKE/ OUTPUT:   I/O last 3 completed shifts:  In: 3871.09 [I.V.:2411.09; NG/GT:560]  Out: 2440 [Urine:2440]    3. PHYSICAL EXAMINATION:   General: NAD, supine  HEENT: moist mucous membranes   Neuro: Moving all extremities, following commands  Pulm/Resp: Breathing comfortably on supplemental O2  CV: RRR  Abdomen: Soft, non-distended, non-tender  : ayala catheter in place  MSK/Extremities: peripheral pulses intact, extremities well perfused      4. INVESTIGATIONS:   Arterial Blood Gases   Recent Labs   Lab 01/20/20  1239 01/20/20  1027 01/20/20  0726 01/20/20  0424   PH 7.44 7.40 7.43 7.39   PCO2 31* 39 35 42   PO2 75* 74* 60* 70*   HCO3 21 24 24 25     Complete Blood Count   Recent Labs   Lab 01/20/20  0424 01/19/20  1603 01/19/20  0329 01/18/20  1644   WBC 12.5* 15.2* 17.4* 18.0*   HGB  14.6 15.5 15.9 16.4   * 135* 141* 141*     Basic Metabolic Panel  Recent Labs   Lab 01/20/20  1239 01/20/20  0424 01/19/20  1603 01/19/20  0329 01/18/20  2159   NA  --  146* 146* 144 146*   POTASSIUM 2.6* 3.4 3.6 4.0 3.8   CHLORIDE  --  117* 119* 118* 120*   CO2  --  26 25 21 22   BUN  --  27 25 24 23   CR  --  0.86 0.88 0.86 0.85   GLC  --  103* 119* 124* 104*     Liver Function Tests  Recent Labs   Lab 01/20/20  0424 01/19/20  0329 01/18/20  0416 01/17/20  1952 01/17/20  1812 01/17/20  1639   AST  --  87* 154* 191* 209*  --    ALT  --  144* 158* 164* 174*  --    ALKPHOS  --  49 44 47 49  --    BILITOTAL  --  1.6* 1.8* 1.8* 1.9*  --    ALBUMIN  --  2.8* 3.1* 3.2* 3.4  --    INR 1.33* 1.38* 1.44*  --   --  1.47*     Pancreatic Enzymes  No lab results found in last 7 days.  Coagulation Profile  Recent Labs   Lab 01/20/20  0424 01/19/20  0329 01/18/20  0416 01/17/20  1639 01/17/20  1226   INR 1.33* 1.38* 1.44* 1.47* 1.45*   PTT 56* 64* 101*  --  39*     Lactate  Invalid input(s): LACTATE    5. RADIOLOGY:   Recent Results (from the past 24 hour(s))   XR Chest Port 1 View    Narrative    Exam: XR CHEST PORT 1 VW, 1/18/2020 8:23 AM    Indication: respiratory failure    Comparison: Chest x-ray 1/17/2020    Findings:   Portable semiupright AP radiograph of the chest. Endotracheal tube tip  projects 4.7 cm above the [place]. Right IJ central venous catheter tip  projects over the high SVC. Enteric tube courses below the diaphragm.  Esophageal temperature probe tip projects near the gastroesophageal  junction. Prominent cardiac silhouette with widening of the  mediastinum and increased interstitial markings bilaterally. Unchanged  hazy retrocardiac opacities. No pneumothorax. Trace bilateral pleural  effusions.      Impression    Impression:   1. Stable position of supportive devices.  2. Stable prominent cardiac silhouette with widened mediastinum  (related to known aortic dissection) and interstitial pulmonary  edema.    3. Unchanged retrocardiac atelectasis versus consolidation.    I have personally reviewed the examination and initial interpretation  and I agree with the findings.    CARRIE RAMIREZ, DO       =========================================

## 2020-01-20 NOTE — PLAN OF CARE
Major Shift Events: No acute events overnight.  Pt received 20mg IV lasix x1 for elevated CVP in setting of low urine output. Started pressure support trial at 0630. Continues on heparin at 1380 and fentanyl at 25. Osvaldo and propofol currently off.   Plan: Continue PS trial as tolerated with hopes of extubation this AM.   For vital signs and complete assessments, please see documentation flowsheets.

## 2020-01-20 NOTE — PROGRESS NOTES
CVTS providers at bedside. MD ok with extubating pt. MD will place order. RT notified. Will place pt on capnography post extubation.

## 2020-01-21 NOTE — PLAN OF CARE
Major Shift Events:  pt extubated around 12. Tolerated well, ABG as documented. Pt started on esmolol to keep SBP below 100 and MAP greater then 65. Mag, k and phos replaced per MAR. Good urine output, about 50ml/hr. Family at bedside and had a conversation with Dr. Weeks today, pt would like to proceed with surgery    Plan: continue to monitor, replace electrolytes as needed, keep MAP> 65 and SBP< 100, HR between 50-70, surgery planned for Wednesday.    For vital signs and complete assessments, please see documentation flowsheets.

## 2020-01-21 NOTE — PROGRESS NOTES
CVTS PROGRESS NOTE  1/21/2020  Enmanuel Hernandez  [id number]  Admitted: 1/17/2020 12:21 PM      CO-MORBIDITIES:   Dissection of aorta, unspecified portion of aorta (H)    ASSESSMENT: Enmanuel Hernandez is a 74 year old male who was admitted for aortic dissection. Patient transferred from [place] 1/16/20 and then transferred to [facility] in [place]. Air lifted to [facility] 1/17/20 where patient was found to be a prohibitively high risk of operation given his history of AVR. Will medically manage and optimize to reassess risk of surgery.  Discussed code status with wife, patient does not want Chest compressions but ok with having an ETT.  Tolerating PS this AM, extubated 1/20.  CVTS plan to discuss surgery option with patient this afternoon.  Sedation discontinued.  Esmolol started for hypertension.  Bronchial cultures positive for haemophilus and strep pneumonia, patient narrowed to ceftriaxone.    TODAY'S PROGRESS:   -Extubated  -Discussed surgical options with patient  - Start Nicardipine gtt for max dose of esmolol  - Start 25mg BID Metoprolol to assist in dropping esmolol requirement and volume associated with.  - EKG - showed flutter, 5mg IV metoprolol given  - Recheck electrolytes  - F/u ABG    PLAN:  Neurological:  No indication for analgesia or sedation at this time     Pulmonary:   #Acute Hypoxia s/p extubation  #Strep pneumonia with superimposed haemophilus influenza   - Start ceftriaxone   - Encourage Incentive spirometer every 15- 30 minutes   - Mucomyst nebs     Cardiovascular:    #History of AVR  #History of Aortic dissection s/p repair with new type A ascending aortic dissection  #A flutter   - Titrate esmolol to maintain SBP <100 mmHg & HR < 60 bpm   - Diuresis to CVP goal 8-12   - Monitor via flotrak   - Start Metoprolol 25mg BID   - Start Nicardipine gtt       Gastroenterology/Nutrition:  - NPO for now  - Trend lactic acid.   - Neutra-phos     Electrolytes/Renal:  #Hypervolemia   - Strict I&Os, ayala to  remain   - Trend creatinine   - Diuresis to CVP goal 8-12     Endocrine:  # Hyperglycemia   - SSI    - Goal to keep BG< 180 mg/dL       ID:  #Strep pneumonia with superimposed haemophilus influenza   - Start ceftriaxone through 1/25   - Discontinue Vancomycin and zosyn     Heme:      -Transfuse if hgb <7.0 or signs/symptoms of hypoperfusion.    -Heparin gtt          General Cares/Prophylaxis:    DVT Prophylaxis: Heparin gtt  GI Prophylaxis: PPI       Lines/ tubes/ drains:  - ETT, PIV, A.line, ayala, RIJ CVC    Disposition: CV ICU    Patient seen, findings and plan discussed with CVTS    Herbert Muñiz MD  Anesthesiology Resident PGY-3, CA-2  CVICU        ====================================    TODAY'S PROGRESS:   SUBJECTIVE:   NAEO.  Face mask for O2      OBJECTIVE:   1. VITAL SIGNS:   Temp:  [98  F (36.7  C)-99.9  F (37.7  C)] 98.2  F (36.8  C)  Heart Rate:  [61-90] 86  Resp:  [14-24] 20  MAP:  [61 mmHg-93 mmHg] 72 mmHg  Arterial Line BP: ()/(47-74) 90/61  SpO2:  [90 %-98 %] 92 %  Ventilation Mode: (S) CPAP/PS  (Continuous positive airway pressure with Pressure Support)  FiO2 (%): 40 %  Rate Set (breaths/minute): 14 breaths/min  Tidal Volume Set (mL): 500 mL  PEEP (cm H2O): 8 cmH2O  Pressure Support (cm H2O): 7 cmH2O  Oxygen Concentration (%): 40 %  Resp: 20      2. INTAKE/ OUTPUT:   I/O last 3 completed shifts:  In: 3056.41 [P.O.:240; I.V.:2336.41; NG/GT:180]  Out: 2315 [Urine:2315]    3. PHYSICAL EXAMINATION:   General: NAD, supine  HEENT: moist mucous membranes   Neuro: Moving all extremities, following commands  Pulm/Resp: Breathing comfortably on supplemental O2  CV: RRR  Abdomen: Soft, non-distended, non-tender  : ayala catheter in place  MSK/Extremities: peripheral pulses intact, extremities well perfused      4. INVESTIGATIONS:   Arterial Blood Gases   Recent Labs   Lab 01/21/20  1124 01/20/20  1239 01/20/20  1027 01/20/20  0726   PH 7.46* 7.44 7.40 7.43   PCO2 33* 31* 39 35   PO2 68* 75* 74* 60*    HCO3 23 21 24 24     Complete Blood Count   Recent Labs   Lab 01/21/20  0353 01/20/20  1613 01/20/20  0424 01/19/20  1603   WBC 9.3 11.6* 12.5* 15.2*   HGB 13.5 14.2 14.6 15.5   * 128* 135* 135*     Basic Metabolic Panel  Recent Labs   Lab 01/21/20  1316 01/21/20  0353 01/20/20  1613 01/20/20  1239 01/20/20  0424 01/19/20  1603   NA  --  149* 148*  --  146* 146*   POTASSIUM 3.8 3.9 4.2 2.6* 3.4 3.6   CHLORIDE  --  117* 119*  --  117* 119*   CO2  --  27 27  --  26 25   BUN  --  26 27  --  27 25   CR  --  0.81 0.84  --  0.86 0.88   GLC  --  78 86  --  103* 119*     Liver Function Tests  Recent Labs   Lab 01/21/20  0353 01/20/20  0424 01/19/20  0329 01/18/20  0416 01/17/20 1952 01/17/20  1812   AST  --   --  87* 154* 191* 209*   ALT  --   --  144* 158* 164* 174*   ALKPHOS  --   --  49 44 47 49   BILITOTAL  --   --  1.6* 1.8* 1.8* 1.9*   ALBUMIN  --   --  2.8* 3.1* 3.2* 3.4   INR 1.35* 1.33* 1.38* 1.44*  --   --      Pancreatic Enzymes  No lab results found in last 7 days.  Coagulation Profile  Recent Labs   Lab 01/21/20  0353 01/20/20  0424 01/19/20  0329 01/18/20 0416   INR 1.35* 1.33* 1.38* 1.44*   PTT 66* 56* 64* 101*     Lactate  Invalid input(s): LACTATE    5. RADIOLOGY:   Recent Results (from the past 24 hour(s))   XR Chest Port 1 View    Narrative    Exam: XR CHEST PORT 1 VW, 1/18/2020 8:23 AM    Indication: respiratory failure    Comparison: Chest x-ray 1/17/2020    Findings:   Portable semiupright AP radiograph of the chest. Endotracheal tube tip  projects 4.7 cm above the [place]. Right IJ central venous catheter tip  projects over the high SVC. Enteric tube courses below the diaphragm.  Esophageal temperature probe tip projects near the gastroesophageal  junction. Prominent cardiac silhouette with widening of the  mediastinum and increased interstitial markings bilaterally. Unchanged  hazy retrocardiac opacities. No pneumothorax. Trace bilateral pleural  effusions.      Impression    Impression:    1. Stable position of supportive devices.  2. Stable prominent cardiac silhouette with widened mediastinum  (related to known aortic dissection) and interstitial pulmonary edema.    3. Unchanged retrocardiac atelectasis versus consolidation.    I have personally reviewed the examination and initial interpretation  and I agree with the findings.    CARRIE RAMIREZ, DO       =========================================

## 2020-01-21 NOTE — PLAN OF CARE
D/I:  Neuro: Alert and inconsistently oriented x4. Limited movements in all extremities, strengths 2/5. Denied pain overnight.   Cardiac: SR with frequent PACs and PVCs. HR 60s-70s. MAP >65. SBP 80s-90s, up to 110s with activity and cough. CVP 14-18, see flowsheet for hemodynamics. Esmolol titrated to meet goals.  Electrolyte replacement protocols in progress.  Resp: 4L NC, O2 stats >90%. Strong, harsh cough; patient swallows sputum. Encouraged deep breathing and cough, patient unable. Nebs Q4H.  GI/: Tolerating clear liquids, no aphasia noted. Abdomen distended, passing gas. No BM overnight; used bedpan. Cabrera in place, urine output  mL per hour, decreasing urine output this am. On scheduled lasix.    Plan: Continue medical management for possible surgery on Wednesday. Monitor hemodynamics and follow POC.

## 2020-01-21 NOTE — PLAN OF CARE
OT/4E: Per discussion with nursing, patient now placed on bedrest in anticipation of aortic dissection on 1/22. Will HOLD until 1/23 and reassess appropriateness of OT initiating services at that time.

## 2020-01-21 NOTE — PROGRESS NOTES
"SPIRITUAL HEALTH SERVICES  SPIRITUAL ASSESSMENT Progress Note  [facility] ([place] 4E     REFERRAL SOURCE: initial  visit with pt's family, per referral from charge nurse. Pt had not been screened for Spiritual Health Services.    Family shared that pt/family have strong spiritual support. Pt is active member of [email or web address] - his  is aware of pt being at [facility]. Pt's daughter's [employer] [facility] [facility]) is providing spiritual support to pt and family while pt in hospital. Per daughter \"our [place] nurse has already visited and my  will be coming tomorrow.\" I oriented family to Spiritual Health Services, emphasizing that  support is available 24/7 as a back-up to their own spiritual support. Family expressed appreciation knowing that  support always available.     PLAN: no follow-up indicated at this time -  support always available if requested.    Meño Robin M.Div (Bill)., [facility]  Staff   Pager [phone]      "

## 2020-01-22 NOTE — ANESTHESIA PROCEDURE NOTES
MONAE Probe Insertion Note:    Inserted by:  Kofi Souza MD (Responsible Anesthesiologist)  Probe Number: [id number]  Probe Status PRE Insertion: NO obvious damage  Probe type:  Adult 3D    Bite block used:   Yes  Insertion Technique: Easy, no oropharyngeal manipulation  Insertion complications: None obvious    Billing Report:MONAE report by Anesthesiologist (See Separate Report note)    Probe Status POST Removal: NO obvious damage

## 2020-01-22 NOTE — PROGRESS NOTES
CVTS PROGRESS NOTE  1/22/2020  Enmanuel Hernandez  [id number]  Admitted: 1/17/2020 12:21 PM      CO-MORBIDITIES:   Dissection of aorta, unspecified portion of aorta (H)    ASSESSMENT: Enmanuel Hernandez is a 74 year old male who was admitted for aortic dissection. Patient transferred from [place] 1/16/20 and then transferred to [facility] in [place]. Air lifted to [facility] 1/17/20 where patient was found to be a prohibitively high risk of operation given his history of AVR. Will medically manage and optimize to reassess risk of surgery.  Discussed code status with wife, patient does not want Chest compressions but ok with having an ETT.  Tolerating PS this AM, extubated 1/20.  CVTS plan to discuss surgery option with patient this afternoon.  Sedation discontinued.  Bronchial cultures positive for haemophilus and strep pneumonia, patient narrowed to ceftriaxone.  Re-intubated 1/21.    TODAY'S PROGRESS:   -Intubated overnight  -Plan to go to OR today    PLAN:  Neurological:  #Perioperative Pain and anxiety   -Propofol gtt   -PRN dilaudid, PRN oxycodone     Pulmonary:   #Acute hypoxic respiratory failure  #Strep pneumonia with superimposed haemophilus influenza   - Continue ceftriaxone   - HOB > 30 degrees   - Mucomyst nebs   - Increase PEEP     Cardiovascular:    #History of AVR  #History of Aortic dissection s/p repair with new type A ascending aortic dissection  #A flutter   - Maintain SBP <100 mmHg & HR < 60 bpm   - Diuresis to CVP goal 8-12   - Monitor via flotrak   - Metoprolol 25mg BID     Gastroenterology/Nutrition:  - NPO except for medications  - Trend lactic acid.   - Neutra-phos  - PPI     Electrolytes/Renal:  #Hypervolemia  #Hypernatremia   - Strict I&Os, ayala to remain   - Trend creatinine   - Diuresis to CVP goal 8-12   - Increase FW to 50 ml/hr     Endocrine:  # Hyperglycemia   - SSI    - Goal to keep BG< 180 mg/dL       ID:  #Strep pneumonia with superimposed haemophilus influenza   - Start ceftriaxone  through 1/25     Heme:      -Transfuse if hgb <7.0 or signs/symptoms of hypoperfusion.    -Holding Heparin gtt for OR          General Cares/Prophylaxis:    DVT Prophylaxis: Heparin gtt  GI Prophylaxis: PPI       Lines/ tubes/ drains:  - ETT, PIV, A.line, ayala, RIJ CVC, rectal tube    Disposition: CV ICU    Patient seen, findings and plan discussed with CVTS Fellow    Rashel Goldberg Jr., MD  Surgical ICU Fellow  Pager: [phone]  1/22/2020  11:24 AM      ====================================    TODAY'S PROGRESS:   SUBJECTIVE:   Intubated overnight.        OBJECTIVE:   1. VITAL SIGNS:   Temp:  [98.2  F (36.8  C)-99.2  F (37.3  C)] 99  F (37.2  C)  Heart Rate:  [] 89  Resp:  [13-33] 22  MAP:  [60 mmHg-128 mmHg] 71 mmHg  Arterial Line BP: ()/() 94/54  FiO2 (%):  [60 %-100 %] 70 %  SpO2:  [88 %-97 %] 94 %  Ventilation Mode: CMV/AC  (Continuous Mandatory Ventilation/ Assist Control)  FiO2 (%): 70 %  Rate Set (breaths/minute): 12 breaths/min  Tidal Volume Set (mL): 500 mL  PEEP (cm H2O): 5 cmH2O  Oxygen Concentration (%): 70 %  Resp: 22      2. INTAKE/ OUTPUT:   I/O last 3 completed shifts:  In: 2536.6 [I.V.:2536.6]  Out: 3400 [Urine:3400]    3. PHYSICAL EXAMINATION:   General: NAD, supine  HEENT: moist mucous membranes   Neuro: Moving all extremities  Pulm/Resp: Mechanical breath sounds bilaterally  CV: RRR  Abdomen: Soft, non-distended, non-tender  : ayala catheter in place  MSK/Extremities: peripheral pulses intact, extremities well perfused      4. INVESTIGATIONS:   Arterial Blood Gases   Recent Labs   Lab 01/22/20  0332 01/22/20  0207 01/21/20  2312 01/21/20  1717   PH 7.47* 7.47* 7.46* 7.45   PCO2 32* 32* 33* 31*   PO2 67* 55* 56* 64*   HCO3 23 23 23 22     Complete Blood Count   Recent Labs   Lab 01/22/20  0332 01/21/20  1623 01/21/20  0353 01/20/20  1613   WBC 11.5* 11.8* 9.3 11.6*   HGB 14.7 15.0 13.5 14.2    144* 130* 128*     Basic Metabolic Panel  Recent Labs   Lab 01/22/20 0332  01/21/20  2312 01/21/20  1623 01/21/20  1316 01/21/20  0353 01/20/20  1613   *  --  148*  --  149* 148*   POTASSIUM 4.1 3.1* 3.7 3.8 3.9 4.2   CHLORIDE 116*  --  117*  --  117* 119*   CO2 30  --  25  --  27 27   BUN 30  --  24  --  26 27   CR 0.90  --  0.80  --  0.81 0.84   *  --  96  --  78 86     Liver Function Tests  Recent Labs   Lab 01/22/20  0332 01/21/20  0353 01/20/20  0424 01/19/20  0329 01/18/20 0416 01/17/20 1952 01/17/20  1812   AST  --   --   --  87* 154* 191* 209*   ALT  --   --   --  144* 158* 164* 174*   ALKPHOS  --   --   --  49 44 47 49   BILITOTAL  --   --   --  1.6* 1.8* 1.8* 1.9*   ALBUMIN  --   --   --  2.8* 3.1* 3.2* 3.4   INR 1.31* 1.35* 1.33* 1.38* 1.44*  --   --      Pancreatic Enzymes  No lab results found in last 7 days.  Coagulation Profile  Recent Labs   Lab 01/22/20  0332 01/21/20  0353 01/20/20  0424 01/19/20  0329   INR 1.31* 1.35* 1.33* 1.38*   PTT 48* 66* 56* 64*     Lactate  Invalid input(s): LACTATE    5. RADIOLOGY:   Recent Results (from the past 24 hour(s))   XR Chest Port 1 View    Narrative    Exam: XR CHEST PORT 1 VW, 1/18/2020 8:23 AM    Indication: respiratory failure    Comparison: Chest x-ray 1/17/2020    Findings:   Portable semiupright AP radiograph of the chest. Endotracheal tube tip  projects 4.7 cm above the cheyenne. Right IJ central venous catheter tip  projects over the high SVC. Enteric tube courses below the diaphragm.  Esophageal temperature probe tip projects near the gastroesophageal  junction. Prominent cardiac silhouette with widening of the  mediastinum and increased interstitial markings bilaterally. Unchanged  hazy retrocardiac opacities. No pneumothorax. Trace bilateral pleural  effusions.      Impression    Impression:   1. Stable position of supportive devices.  2. Stable prominent cardiac silhouette with widened mediastinum  (related to known aortic dissection) and interstitial pulmonary edema.    3. Unchanged retrocardiac atelectasis  versus consolidation.    I have personally reviewed the examination and initial interpretation  and I agree with the findings.    CARRIE RAMIREZ, DO       =========================================

## 2020-01-22 NOTE — ANESTHESIA PROCEDURE NOTES
Perioperative MONAE Report  Anesthesia Information  MONAE probe placed and report generated by: : Kofi Souza MD Kiberenge, Roy Kagumba, MD  Images for this study have been archived.   Surgeon:  Mitch Weeks MD      Preanesthesia Checklist:  Patient identified, IV assessed, risks and benefits discussed, monitors and equipment assessed, procedure being performed at surgeon's request and anesthesia consent obtained.  General Procedure Information  Modalities:  2D, 3D, CW Doppler and PW Doppler      Echocardiographic and Doppler Measurements      Other Ventricular Findings:  LV is normal in size, with preserved systolic function   LVEF: 55-60 % by visual estimation   E: 94.3 cm/s   A: 121 cm/s  decel time 349 ms    E/A: 0.8        RV is normal in size with preserved systolic function        Ventricular Regional Function:  1- Basal Anteroseptal:  normal  2- Basal Anterior:  normal  3- Basal Anterolateral:  normal  4- Basal Inferolateral:  normal  5- Basal Inferior:  normal  6- Basal Inferoseptal:  normal  7- Mid Anteroseptal:  normal  8- Mid Anterior:  normal  9- Mid Anterolateral:  normal  10- Mid Inferolateral:  normal  11- Mid Inferior:  normal  12- Mid Inferoseptal:  normal  13- Apical Anterior:  normal  14- Apical Lateral:  normal  15- Apical Inferior:  normal  16- Apical Septal:  normal  17- [place]:  normal    Valves          Other Valve Findings:  AV: s/p AVR with mechanical aortic valve         no AS         Max PG: 15 mmHg,  Mean P mmHg, Vmax: 1.9 m/s,           AV VTI: 43.3 cm,  LVOT VTI: 35.6 cm         There is mild perivalvular leak, no AI              MV: normal mitral leaflet morphology         no MR          no MS    TV: mild TR. Tricuspid valve annulus: 2.7cm      PV: mild PI     Aorta: Other Aortic Findings:  There is an aortic dissection that starts at the aortic valve and is seen in the lower thoracic region   The true lumen measures 5.7cm and the entire aorta measures 6.6cm  at the level of the PA bifurcation       Right Atrium:  Size dilated.    Left Atrium: Size dilated.  Left atrial appendage normal.   Other Atria Findings:  There is a large eustachian valve   Atrial Septum: Other atrial septal defect findings:  There is a small PFO       Other Findings:   .  . .  .  Cornoary sinus catheter present.  .  .  Post Intervention Findings  Procedure(s) performed:  Ascending Aorta Repair. .   Regional wall motion:   Surgeon(s) notified of all postintervention findings:  Yes  .  .  .   .  .  Ascending Aorta:  Ascending aorta repaired with graft.  .  .  .  .    Patient transitioned from CPB to ECMO following repair due to profound vasoplegia.  Ventricular and valvular function appears unchanged from baseline.    Echocardiogram Comments

## 2020-01-22 NOTE — ANESTHESIA PROCEDURE NOTES
Arterial Line Procedure Note  Staff:     Anesthesiologist:  Kofi Souza MD    Resident/CRNA:  Zeenat Longoria MD    Arterial line performed by resident/CRNA in presence of a teaching physician    Location: In OR After Induction  Procedure Start/Stop Times:     patient identified, IV checked, site marked, risks and benefits discussed, informed consent, monitors and equipment checked, pre-op evaluation and at physician/surgeon's request      Correct Patient: Yes      Correct Position: Yes      Correct Site: Yes      Correct Procedure: Yes      Correct Laterality:  Yes    Site Marked:  Yes  Line Placement:     Procedure:  Arterial Line    Insertion Site:  Radial    Insertion laterality:  Right    Skin Prep: Chloraprep      Patient Prep: patient draped      Local skin infiltration:  None    Ultrasound Guided?: Yes      Artery evaluated via ultrasound confirming patency.   Using realtime imaging, the artery was punctured and the needle was observed entering the artery.      A permanent image is NOT entered into the patient's record.      Catheter size:  20 gauge, 12 cm    Cath secured with: suture      Dressing:  Tegaderm    Complications:  None obvious    Arterial waveform: Yes      IBP within 10% of NIBP: Yes

## 2020-01-22 NOTE — PLAN OF CARE
Major Shift Events:  Pt was sedated on propofol overnight. Pt required nicardipine drip part of the night to keep MAP at goal. Bumex given x2, see flowsheet for urine output. ABG's trended, rate on ventilator decreased and oxygen trended per SpO2.  Plan: Pt to go to OR at 1230 for aortic dissection repair. Continue plan of care.  For vital signs and complete assessments, please see documentation flowsheets.

## 2020-01-22 NOTE — PLAN OF CARE
Major Shift Events:  Pt lethargic throughout the day, responding to commands. Disoriented x3. Cardene added, Esmolol and cardene titrated to keep MAPs >65. Frequent PVC/PACs, HR labile around 11 am, 12 lead done w/ rhythm demonstrating a flutter w/ PVC. MD updated, 5 mg IV metoprolol given x1. Na phos replaced x1, potassium x2. Pt became more tachypneic and requiring increased O2 demands this afternoon/evening, HFNC placed, chest x-ray and ABG done, and additional lasix given. Two hours post HFNC, pt continued to desat to 85% despite 100% FiO2; MD notified and decision made to intubate tonight.Family at bedside and updated per POC.    Plan: Plan for aortic dissection repair tomorrow.     For vital signs and complete assessments, please see documentation flowsheets.

## 2020-01-22 NOTE — PROGRESS NOTES
[facility]  Procedure Note           Intubation:       Enmanuel Hernandez  MRN# [id number]   January 21, 2020, 7:19 PM Indication: Respiratory failure           Patient intubated at: January 21, 2020, 7:19 PM   Patient and family informed of: Why intubation was required  Possible length of time endotracheal tube will remain in place  Communication impairment due to the endotracheal tube       Cervical spine: Was not stabilized during the procedure   Sedative medication: Was administered during the procedure   Technique used: Fiberoptic visualization with GlideScope   Endotracheal tube size: 8.0 cm with cuff   Number of attempts: 1   Placement confirmed by: Auscultation of bilateral breath sounds  Visualization of bilateral chest wall rise  End-tidal CO2 monitor  Chest X-ray   ET tube repositioning: Was not performed   Tube secured at: 23 cm      This procedure was performed without difficulty and he tolerated the procedure well with no complications.      Recorded by Shikha Finley

## 2020-01-22 NOTE — PROGRESS NOTES
CV ICU PROGRESS NOTE  1/22/2020  Enmanuel Hernandez  [id number]  Admitted: 1/17/2020 12:21 PM      CO-MORBIDITIES:   Dissection of aorta, unspecified portion of aorta (H)    ASSESSMENT: Enmanuel Hernandez is a 74 year old male who was admitted for aortic dissection. Patient transferred from [place] 1/16/20 and then transferred to [facility] in [place]. Air lifted to [facility] 1/17/20 where patient was found to be a prohibitively high risk of operation given his history of AVR. Will medically manage and optimize to reassess risk of surgery.  Discussed code status with wife, patient does not want Chest compressions but ok with having an ETT.  Tolerating PS this AM, extubated 1/20.  CVTS plan to discuss surgery option with patient this afternoon.  Sedation discontinued.  Bronchial cultures positive for haemophilus and strep pneumonia, patient narrowed to ceftriaxone.  Re-intubated 1/21.    TODAY'S PROGRESS:   -Intubated overnight  -Plan to go to OR today    PLAN:  Neurological:  #Perioperative Pain and anxiety   -Propofol gtt   -PRN dilaudid, PRN oxycodone     Pulmonary:   #Acute hypoxic respiratory failure  #Strep pneumonia with superimposed haemophilus influenza   - Continue ceftriaxone   - HOB > 30 degrees   - Mucomyst nebs   - Increase PEEP     Cardiovascular:    #History of AVR  #History of Aortic dissection s/p repair with new type A ascending aortic dissection  #A flutter   - Maintain SBP <100 mmHg & HR < 60 bpm   - Diuresis to CVP goal 8-12   - Monitor via flotrak   - Metoprolol 25mg BID     Gastroenterology/Nutrition:  - NPO except for medications  - Trend lactic acid.   - Neutra-phos  - PPI     Electrolytes/Renal:  #Hypervolemia  #Hypernatremia   - Strict I&Os, ayala to remain   - Trend creatinine   - Diuresis to CVP goal 8-12   - Increase FW to 50 ml/hr     Endocrine:  # Hyperglycemia   - SSI    - Goal to keep BG< 180 mg/dL       ID:  #Strep pneumonia with superimposed haemophilus influenza   - Start ceftriaxone  through 1/25     Heme:      -Transfuse if hgb <7.0 or signs/symptoms of hypoperfusion.    -Holding Heparin gtt for OR          General Cares/Prophylaxis:    DVT Prophylaxis: Heparin gtt  GI Prophylaxis: PPI       Lines/ tubes/ drains:  - ETT, PIV, A.line, ayala, RIJ CVC, rectal tube    Disposition: CV ICU    Patient seen, findings and plan discussed with CV ICU staff Intensivist Dr. Danilo Goldberg Jr., MD  Surgical ICU Fellow  Pager: [phone]  1/22/2020  11:24 AM      ====================================    TODAY'S PROGRESS:   SUBJECTIVE:   Intubated overnight.        OBJECTIVE:   1. VITAL SIGNS:   Temp:  [98.2  F (36.8  C)-99.2  F (37.3  C)] 99  F (37.2  C)  Heart Rate:  [] 89  Resp:  [13-33] 22  MAP:  [60 mmHg-128 mmHg] 71 mmHg  Arterial Line BP: ()/() 94/54  FiO2 (%):  [60 %-100 %] 70 %  SpO2:  [88 %-97 %] 94 %  Ventilation Mode: CMV/AC  (Continuous Mandatory Ventilation/ Assist Control)  FiO2 (%): 70 %  Rate Set (breaths/minute): 12 breaths/min  Tidal Volume Set (mL): 500 mL  PEEP (cm H2O): 5 cmH2O  Oxygen Concentration (%): 70 %  Resp: 22      2. INTAKE/ OUTPUT:   I/O last 3 completed shifts:  In: 2536.6 [I.V.:2536.6]  Out: 3400 [Urine:3400]    3. PHYSICAL EXAMINATION:   General: NAD, supine  HEENT: moist mucous membranes   Neuro: Moving all extremities  Pulm/Resp: Mechanical breath sounds bilaterally  CV: RRR  Abdomen: Soft, non-distended, non-tender  : ayala catheter in place  MSK/Extremities: peripheral pulses intact, extremities well perfused      4. INVESTIGATIONS:   Arterial Blood Gases   Recent Labs   Lab 01/22/20  0332 01/22/20  0207 01/21/20  2312 01/21/20  1717   PH 7.47* 7.47* 7.46* 7.45   PCO2 32* 32* 33* 31*   PO2 67* 55* 56* 64*   HCO3 23 23 23 22     Complete Blood Count   Recent Labs   Lab 01/22/20  0332 01/21/20  1623 01/21/20  0353 01/20/20  1613   WBC 11.5* 11.8* 9.3 11.6*   HGB 14.7 15.0 13.5 14.2    144* 130* 128*     Basic Metabolic Panel  Recent Labs    Lab 01/22/20  0332 01/21/20  2312 01/21/20  1623 01/21/20  1316 01/21/20  0353 01/20/20  1613   *  --  148*  --  149* 148*   POTASSIUM 4.1 3.1* 3.7 3.8 3.9 4.2   CHLORIDE 116*  --  117*  --  117* 119*   CO2 30  --  25  --  27 27   BUN 30  --  24  --  26 27   CR 0.90  --  0.80  --  0.81 0.84   *  --  96  --  78 86     Liver Function Tests  Recent Labs   Lab 01/22/20  0332 01/21/20  0353 01/20/20  0424 01/19/20  0329 01/18/20  0416 01/17/20 1952 01/17/20  1812   AST  --   --   --  87* 154* 191* 209*   ALT  --   --   --  144* 158* 164* 174*   ALKPHOS  --   --   --  49 44 47 49   BILITOTAL  --   --   --  1.6* 1.8* 1.8* 1.9*   ALBUMIN  --   --   --  2.8* 3.1* 3.2* 3.4   INR 1.31* 1.35* 1.33* 1.38* 1.44*  --   --      Pancreatic Enzymes  No lab results found in last 7 days.  Coagulation Profile  Recent Labs   Lab 01/22/20  0332 01/21/20  0353 01/20/20  0424 01/19/20  0329   INR 1.31* 1.35* 1.33* 1.38*   PTT 48* 66* 56* 64*     Lactate  Invalid input(s): LACTATE    5. RADIOLOGY:   Recent Results (from the past 24 hour(s))   XR Chest Port 1 View    Narrative    Exam: XR CHEST PORT 1 VW, 1/18/2020 8:23 AM    Indication: respiratory failure    Comparison: Chest x-ray 1/17/2020    Findings:   Portable semiupright AP radiograph of the chest. Endotracheal tube tip  projects 4.7 cm above the [place]. Right IJ central venous catheter tip  projects over the high SVC. Enteric tube courses below the diaphragm.  Esophageal temperature probe tip projects near the gastroesophageal  junction. Prominent cardiac silhouette with widening of the  mediastinum and increased interstitial markings bilaterally. Unchanged  hazy retrocardiac opacities. No pneumothorax. Trace bilateral pleural  effusions.      Impression    Impression:   1. Stable position of supportive devices.  2. Stable prominent cardiac silhouette with widened mediastinum  (related to known aortic dissection) and interstitial pulmonary edema.    3. Unchanged  retrocardiac atelectasis versus consolidation.    I have personally reviewed the examination and initial interpretation  and I agree with the findings.    CARRIE RAMIREZ, DO       =========================================

## 2020-01-22 NOTE — ANESTHESIA PROCEDURE NOTES
Central Line Procedure Note  Staff:     Anesthesiologist:  Kofi Souza MD  Location: In OR after induction  Procedure Start/Stop Times:     patient identified, IV checked, site marked, risks and benefits discussed, informed consent, monitors and equipment checked, pre-op evaluation and at physician/surgeon's request      Correct Patient: Yes      Correct Position: Yes      Correct Site: Yes      Correct Procedure: Yes      Correct Laterality:  Yes    Site Marked:  Yes  Line Placement:     Procedure:  Central Line    Insertion laterality:  Right    Insertion site:  Internal Jugular    Position:  Trendelenburg      Maximal Sterile Barriers: All elements of maximal sterile barrier technique followed      (Maximal sterile barriers include:   Sterile gown, Sterile Gloves, Mask, Cap, Whole body draped, hand hygiene and acceptable skin prep).Skin Prep: Chloraprep         Injection Technique:  Ultrasound guided    Sterile Ultrasound Technique:  Sterile probe cover and Sterile gel    Vein evaluated via U/S for patency/adequacy of catheter insertion and is adequate.  Using realtime U/S imaging the vein was punctured, and needle was observed entering vein on U/S      A permanent image is NOT entered into the patient's record.      Local skin infiltration:  None    Catheter size:  9 Fr, 2 lumen 11.5 cm (MAC)    Catheter length at skin (cm):  11    Cath secured with: suture      Dressing:  Tegaderm    Complications:  None obvious    Blood aspirated all lumens: Yes      All Lumens Flushed: Yes      Tip termination: right atrium      Verification method:  Placement to be verified post-op

## 2020-01-22 NOTE — PROGRESS NOTES
CVTS moonlighter notified about PaO2 from ABG tonight. She stated the SpO2 and hemoglobin of the pt shows good oxygenation and she is not worried about the PaO2 being low. She okayed titration of FiO2 to SpO2. Will continue to monitor.

## 2020-01-22 NOTE — ANESTHESIA PROCEDURE NOTES
ANESTHESIOLOGY RESIDENT/CRNA INTUBATION NOTE  Indication for intubation: respiratory insufficiency, altered level of consciousness, airway protection.  Provider Ordering Intubation: Dagmar Gutierrez MD  History regarding the most recent potassium obtained: Yes  History regarding renal failure obtained: Yes  History of presence or absence of CVA/stroke was obtained: Yes  History of presence or absence of NM disorder obtained: Yes  Post Intubation: No apparent complications, Sedation to be ordered by primary/ICU team, Primary/ICU team to review CXR, Vent settings by primary/ICU team, ETT secured and Report given to primary nurse and/or team      Medications Administered  Propofol (DIPRIVAN) injection 10 mg/mL vial, 40 mg  rocuronium (ZEMURON) 10 mg/mL injection, 100 mg    Medication administration at: 1/21/2020 7:10 PM

## 2020-01-23 PROBLEM — N17.9 ACUTE KIDNEY FAILURE, UNSPECIFIED (H): Status: ACTIVE | Noted: 2020-01-01

## 2020-01-23 PROBLEM — Z98.890 STATUS POST AORTIC DISSECTION REPAIR: Status: ACTIVE | Noted: 2020-01-01

## 2020-01-23 NOTE — PROGRESS NOTES
ECMO Shift Summary:      ECMO Equipment:  Console serial number: [id number]  Circuit Lot number: [id number]  Oxygenator Lot number: [id number]        Patient remains on [facility] ECMO, all equipment is functioning and alarms are appropriately set. RPM's 3500 with flow range 4 - 4.38 L/min. Sweep gas is at 1 LPM and FiO2 60%. Circuit remains free of air and clot. Fibrin noted in oxygenator. Cannulas are secure with no bleeding from site. Extremities are warm.    Significant Shift Events: Prior to OR, patient had the following products on MTP: PRBC - 7 units, FFP - 5 units, Platelets - 3 units, Cryo - 1 unit.   Patient went to OR for chest washout due to bleeding from chest tubes - about 600 ml/hr. In OR, patient received 1500 ml Plasmalyte, 830 ml cell saver, 2 units Platelets, 1 unit PRBC, and 2 units FFP. EBL - 2 L. Lasix 80 mg IV given in OR. Epoprostenol was started at 20 ng/kg/min.     Vent settings:  Ventilation Mode: CMV/AC  (Continuous Mandatory Ventilation/ Assist Control)  FiO2 (%): 40 %  Rate Set (breaths/minute): 14 breaths/min  Tidal Volume Set (mL): 500 mL  PEEP (cm H2O): 10 cmH2O  Oxygen Concentration (%): 40 %  Resp: 14  .    No anticoagulation. ACT range 130-142.    Urine output is about 40 ml/hr, blood loss after OR was about 100-150 ml/hr from chest tubes. Product given included 2 units of platelets.      Intake/Output Summary (Last 24 hours) at 1/23/2020 1716  Last data filed at 1/23/2020 1600  Gross per 24 hour   Intake 32706.86 ml   Output 99695 ml   Net 67623.86 ml       ECHO:  No results found for this or any previous visit.No results found for this or any previous visit.    CXR:  Recent Results (from the past 24 hour(s))   XR Chest Port 1 View   Result Value    Radiologist flags (Urgent)     [place]-Mayra catheter position, bilateral pneumothoraces    Narrative    EXAM: XR CHEST PORT 1 VW  1/23/2020 3:00 AM     HISTORY:  S/P repair of aortic dissection    COMPARISON: Chest radiograph dated  1/22/2020    FINDINGS: A single AP view of the chest is obtained. Endotracheal tube  tip is not well-visualized due to overlying surgical sponge. Gastric  tube courses below the left hemidiaphragm with tip projecting over the  left upper quadrant ECMO cannulae. Right IJ central venous catheter  tip projects over the mid SVC. Left IJ [place]-Mayra catheter tip is  coiled in the right pulmonary artery with tip projecting over the  proximal right pulmonary artery. Mediastinal drains and bilateral  chest tubes. Aortic valve prosthesis. Epicardial pacer wires. Multiple  surgical sponges with known open chest per chart. Surgical clips  project over the right scapula.    Trachea is midline. Enlargement of the cardiomediastinal silhouette  status-post aortic dissection repair. Small right pneumothorax with  chest tube in place. Bilateral pleural effusions, greater on the  right. Patchy bibasilar and perihilar opacities. Subcutaneous  emphysema along the right chest wall and axilla.      Impression    IMPRESSION:   In this patient with known open chest:  1. New postoperative changes of aortic dissection repair with multiple  surgical sponges projecting over the mediastinum, consistent with open  postoperative chest.  2. [place]-Mayra catheter is coiled in the right main pulmonary artery and  crosses back toward midline with tip projecting over the proximal  right pulmonary artery. Recommend repositioning.  3. Small right pneumothorax with chest tubes in place.  4. Right greater than left pleural effusions with perihilar and  bibasilar atelectasis.    [Urgent Result: [place]-Mayra catheter position, bilateral pneumothoraces]    Finding was identified on 1/23/2020 3:00 AM.     Dr. Bahena was contacted by Dr. Guillen at 1/23/2020 3:14 AM and  verbalized understanding of the urgent finding.     I have personally reviewed the examination and initial interpretation  and I agree with the findings.    RHODA ANTHONY MD   XR Fluoro Port Time  0/1 Hour    Narrative    This exam was marked as non-reportable because it will not be read by a   radiologist or a [facility] non-radiologist provider.                 Labs:  Recent Labs   Lab 01/23/20  1557 01/23/20  1420 01/23/20  1152 01/23/20  1037   PH 7.61* 7.41 7.57* 7.57*   PCO2 37 59* 39 35   PO2 174* 73* 160* 234*   HCO3 37* 37* 35* 32*   O2PER 40 50.0 40.0 70.0       Lab Results   Component Value Date    HGB 9.3 (L) 01/23/2020    PHGB 30 (H) 01/23/2020    PLT 97 (L) 01/23/2020    FIBR 201 01/23/2020    INR 1.28 (H) 01/23/2020    PTT 36 01/23/2020    DD 5.1 (H) 01/23/2020    AXA <0.10 01/23/2020    ANTCH 49 (L) 01/23/2020         Plan is continue on [facility] ECMO.      Heide Morel RN  1/23/2020 5:16 PM

## 2020-01-23 NOTE — PROGRESS NOTES
ECMO Shift Summary:    ECMO Equipment:  Cardiohelp SN:  [id number]  Tubing pack #:  [id number]  Oxygenator lot #:  [id number]    Patient remains on VA ECMO, all equipment is functioning and alarms are appropriately set. RPM's 3500 with flow range ~4.5 L/min. Sweep gas is at 4 LPM and FiO2 70%. Circuit remains free of visible air, clot and fibrin. Cannulas are secure with oozing from the central site as the dressing is saturating, significant amount of bleeding from CTs (has been on MTP the entire time he has been on the unit, arrived at 0140). Extremities are warm to the touch. Suctioned ETT for scant.    Significant Shift Events:  Pt transported from OR to  at 0140 without issues.  MTP resumed upon arrival to the unit-gave 25 units, see below.    Vent settings:  CMV 14/500/+10/40%      Heparin is not running, ACT range 134-158-most recently 146.    Urine output is ~85 ml/hr, blood loss was significant from the CTs, initial hour in the ICU was >1200mls, now 400-500 mls/hr. Product given included 10 units of PRBCs, 12 units of FFP, and 3 units of platelets per the MTP (all to the pt).      Intake/Output Summary (Last 24 hours) at 1/23/2020 0605  Last data filed at 1/23/2020 0600  Gross per 24 hour   Intake 10361.52 ml   Output 97278 ml   Net 514.52 ml       ECHO:  No results found for this or any previous visit.No results found for this or any previous visit.    CXR:  Recent Results (from the past 24 hour(s))   XR Chest Port 1 View   Result Value    Radiologist flags (Urgent)     [place]-Mayra catheter position, bilateral pneumothoraces    Narrative    EXAM: XR CHEST PORT 1 VW  1/23/2020 3:00 AM     HISTORY:  S/P repair of aortic dissection    COMPARISON: Chest radiograph dated 1/22/2020    FINDINGS: A single AP view of the chest is obtained. Endotracheal tube  tip is not well-visualized due to overlying surgical sponge. Gastric  tube courses below the left hemidiaphragm with tip projecting over the  left upper quadrant ECMO  cannulae. Right IJ central venous catheter  tip projects over the mid SVC. Left IJ [place]-Mayra catheter tip is  coiled in the right pulmonary artery with tip projecting over the  proximal right pulmonary artery. Mediastinal drains and bilateral  chest tubes. Aortic valve prosthesis. Epicardial pacer wires. Multiple  surgical sponges with known open chest per chart. Surgical clips  project over the right scapula.    Trachea is midline. Enlargement of the cardiomediastinal silhouette  status-post aortic dissection repair. Small right pneumothorax with  chest tube in place. Bilateral pleural effusions, greater on the  right. Patchy bibasilar and perihilar opacities. Subcutaneous  emphysema along the right chest wall and axilla.      Impression    IMPRESSION:   In this patient with known open chest:  1. New postoperative changes of aortic dissection repair with multiple  surgical sponges projecting over the mediastinum, consistent with open  postoperative chest.  2. [place]-Mayra catheter is coiled in the right main pulmonary artery and  crosses back toward midline with tip projecting over the proximal  right pulmonary artery. Recommend repositioning.  3. Small right pneumothorax with chest tubes in place.  4. Right greater than left pleural effusions with perihilar and  bibasilar atelectasis.    [Urgent Result: [place]-Mayra catheter position, bilateral pneumothoraces]    Finding was identified on 1/23/2020 3:00 AM.     Dr. Bahena was contacted by Dr. Guillen at 1/23/2020 3:14 AM and  verbalized understanding of the urgent finding.     I have personally reviewed the examination and initial interpretation  and I agree with the findings.    RHODA ANTHONY MD       Labs:  Recent Labs   Lab 01/23/20  0404 01/23/20  0258 01/23/20  0145 01/23/20  0012   PH 7.41 7.40 7.43 7.26*   PCO2 42 39 36 47*   PO2 292* 242* 316* 340*   HCO3 26 24 24 21   O2PER 40.0 40.0 50.0 50.0       Lab Results   Component Value Date    HGB 9.4 (L)  01/23/2020    PHGB 30 (H) 01/23/2020    PLT 97 (L) 01/23/2020    FIBR 202 01/23/2020    INR 0.93 01/23/2020    PTT 45 (H) 01/23/2020    DD 5.7 (H) 01/23/2020    AXA <0.10 01/23/2020         Plan is to continue VA ECMO, support CT output.      Shannon Mejia, RT  1/23/2020 6:05 AM

## 2020-01-23 NOTE — ANESTHESIA POSTPROCEDURE EVALUATION
Anesthesia POST Procedure Evaluation    Patient: Enmanuel Hernandez   MRN:     [id number] Gender:   male   Age:    74 year old :      1945        Preoperative Diagnosis: Aortic dissection (H) [I71.00]   Procedure(s):  EMERGENT INCISION AND DRAINAGE, WOUND, CHEST, WITH IRRIGATION   Postop Comments: No value filed.       Anesthesia Type:  Not documented  No value filed.    Reportable Event: NO     PAIN: Uncomplicated   Sign Out status: Comfortable, Well controlled pain     PONV: No PONV   Sign Out status:  No Nausea or Vomiting     Neuro/Psych: Uneventful perioperative course   Sign Out Status: Planned Postop Sedation     Airway/Resp.: Uneventful perioperative course   Sign Out Status: Airway Device present     Airway Device: ETT                 Reason: Planned (pre-op)     CV: Uneventful perioperative course   Sign Out status: CV Support within EXPECTED Parameters     Disposition:   Sign Out in:  ICU  Disposition:  ICU  Recovery Course: Recovery in ICU  Follow-Up: Not required           Last Anesthesia Record Vitals:  CRNA VITALS  2020 1038 - 2020 1138      2020             SpO2:  100 %    Resp Rate (set):  14          Last PACU Vitals:  Vitals Value Taken Time   BP     Temp 36.7  C (98.06  F) 2020  2:55 PM   Pulse     Resp     SpO2 99 % 2020  2:55 PM   Temp src     NIBP     Pulse     SpO2     Resp     Temp     Ht Rate     Temp 2     Vitals shown include unvalidated device data.      Electronically Signed By: Ncaho Thrasher MD, 2020, 2:55 PM

## 2020-01-23 NOTE — PLAN OF CARE
Major Shift Events: Pt arrived to 4E from OR @ 0140. Pt arrived with MTP going, continued throughout shift. 3:1 nursing care.  PRBC's: 10, FFP: 12, Plt: 3. 2g Ca given, 40 mEq K+, 2g Mg, factor 7 and protamine given.  See flowsheet.   Plan: Continue MTP.   For vital signs and complete assessments, please see documentation flowsheets.      ICU

## 2020-01-23 NOTE — PHARMACY-VANCOMYCIN DOSING SERVICE
"Pharmacy Vancomycin Consult Initial Note    Date of Service 2020  Patient's  1945  74 year old, male    Indication: Surgical Prophylaxis and Open Chest    Current estimated CrCl = Estimated Creatinine Clearance: 55.3 mL/min (A) (based on SCr of 1.34 mg/dL (H)).    Creatinine for last 3 days  2020:  3:53 AM Creatinine 0.81 mg/dL;  4:23 PM Creatinine 0.80 mg/dL  2020:  3:32 AM Creatinine 0.90 mg/dL  2020:  1:45 AM Creatinine 0.86 mg/dL;  2:58 AM Creatinine 0.84 mg/dL;  4:04 AM Creatinine 0.88 mg/dL; 11:52 AM Creatinine 1.07 mg/dL;  3:57 PM Creatinine 1.34 mg/dL    Recent vancomycin level(s) for last 3 days  No results found for requested labs within last 72 hours.    Body mass index is 26.38 kg/m .  Height is 5' 10\".   Wt Readings from Last 2 Encounters:   20 83.4 kg (183 lb 13.8 oz)         Vancomycin IV Administrations (past 72 hours)                   vancomycin 1500 mg in 0.9% NaCl 250 ml intermittent infusion 1,500 mg (mg) 1,500 mg Given 20 0850    vancomycin 1000 mg in Dextrose 5% 200 mL (g) 1 g Given 20 2150     1 g Given  1350              Nephrotoxins and other renal medications (From now, onward)    Start     Dose/Rate Route Frequency Ordered Stop    20 1000  vancomycin 1500 mg in 0.9% NaCl 250 ml intermittent infusion 1,500 mg      1,500 mg  over 90 Minutes Intravenous EVERY 24 HOURS 20 1722      20 0200  vasopressin (VASOSTRICT) 40 Units in D5W 40 mL infusion      1-4 Units/hr  1-4 mL/hr  Intravenous CONTINUOUS 20 0150      20 0200  norepinephrine (LEVOPHED) 16 mg in  mL infusion      0.03-0.4 mcg/kg/min × 84.6 kg (Dosing Weight)  2.4-31.7 mL/hr  Intravenous CONTINUOUS 20 0158      20 0150  furosemide (LASIX) injection 20 mg      20 mg  over 1-2 Minutes Intravenous ONCE PRN 20 0150          Contrast Orders - past 72 hours (72h ago, onward)    None          Plan:  1.  Change post-op vancomycin to 1500 " mg IV q24h for antimicrobial coverage while patient has an open chest. Significant increase in SCr over past 24 hours.  2.  Goal Trough Level: 15-20 mg/L   3.  Pharmacy will check trough levels as appropriate in 1-3 Days.    4.  Serum creatinine levels will be ordered daily for the first week of therapy and at least twice weekly for subsequent weeks.    5.  Tenmile method utilized to dose vancomycin therapy: dosing for ROMI.    Mandie Sarmiento, PharmD  January 23, 2020

## 2020-01-23 NOTE — PROGRESS NOTES
[facility], [facility] Nurse Inpatient Adult Pressure Injury Prevention Assessment: ECMO  Initial     Positioning Tolerance: Poor  Date of ECMO cannulation: 1/23/2020  Presence of Ischemia: No  Location of ischemia: n/a    Pressure Injury Prevention Interventions In Place:  Optifoam Dressing under ECMO Cannula, Z flow Positioner under head, TAPs Wedge Positioners in use, Heel off-loading boots and Pillows under calves for heel suspension   Current support surface: Standard  Low air loss mattress       Pressure Injury Prevention Interventions Added:  Mepilex Sacral Dressing When able        Plan of Care for Positioning and Pressure Injury Prevention  Reposition patient every 1-2 hours using TAP Wedges  Position head on Z flow positioner, mold indentation at areas of pressure points.  Pad ECMO groin and chest cannula under rigid connectors with Optifoam or Soft cloth  Heel off-loading Boots at all times  Sacral Mepilex for Prevention, change every 5 days and prn  Low Air loss mattress    Patient History:   According to medical record: Enmanuel Hernandez is a 74 year old male who was admitted for aortic dissection. Patient transferred from [place] 1/16/20 and then transferred to [facility] in [place]. Air lifted to [facility] 1/17/20 where patient was found to be a prohibitively high risk of operation given his history of AVR. Will medically manage and optimize to reassess risk of surgery.  Discussed code status with wife, patient does not want Chest compressions but ok with having an ETT.  Tolerating PS this AM, extubated 1/20.  CVTS plan to discuss surgery option with patient this afternoon.  Sedation discontinued.  Bronchial cultures positive for haemophilus and strep pneumonia, patient narrowed to ceftriaxone.  Re-intubated 1/21.      Current Diet / Nutrition:     Orders Placed This Encounter      NPO for Medical/Clinical Reasons Except for: Meds      Output:    I/O last 3 completed  shifts:  In: 39613.52 [I.V.:684.52; Other:9706; NG/GT:30]  Out: 35919 [Urine:1385; Emesis/NG output:50; Stool:100; Blood:50116; Chest Tube:4910]  Containment: of urine/stool: Rectal Tube and Urinary Catheter    Risk Assessment:   Sensory Perception: 1-->completely limited  Moisture: 3-->occasionally moist  Activity: 1-->bedfast  Mobility: 1-->completely immobile  Nutrition: 1-->very poor  Friction and Shear: 2-->potential problem  Jefferson Score: 9    Labs:    Recent Labs   Lab 01/23/20  1037  01/23/20  0910 01/23/20  0819  01/23/20  0404  01/20/20  0424   ALBUMIN  --   --   --   --   --  2.2*   < >  --    HGB 11.1*   < >  --  9.4*   < > 9.4*   < > 14.6   INR  --   --  0.92 0.91   < > 0.93   < > 1.33*   WBC  --   --   --  3.3*   < > 3.5*   < > 12.5*   A1C  --   --   --   --   --  5.8*  --   --    CRP  --   --   --   --   --   --   --  150.0*    < > = values in this interval not displayed.       Focused Assessment:  Chest ECMO cannula, Ears, Face and Feet    Pressure Injury Present::No   1/23: Do to pt condition, unable to turn at this time except microturns.  Sacral mepilex to be placed when turning.  WOC will check in with RN tomorrow and see skin if needed.     Education provided to: nurse  Discussed importance of:their role in pressure injury prevention  Discussed plan of care with Nurse  WOC Nurse follow-up plan:weekly    Marva Swartz RN

## 2020-01-23 NOTE — PROGRESS NOTES
CV ICU PROGRESS NOTE  1/23/2020  Enmanuel Hernandez  [id number]  Admitted: 1/17/2020 12:21 PM      CO-MORBIDITIES:   Dissection of aorta, unspecified portion of aorta (H)    ASSESSMENT: Enmanuel Hernandez is a 74 year old male who was admitted for aortic dissection. Patient transferred from [place] 1/16/20 and then transferred to [facility] in [place]. Air lifted to [facility] 1/17/20 where patient was found to be a prohibitively high risk of operation given his history of AVR. Will medically manage and optimize to reassess risk of surgery.  Discussed code status with wife, patient does not want Chest compressions but ok with having an ETT.  Bronchial cultures positive for haemophilus and strep pneumonia, started on Zosyn-Vanc (1/17) and patient narrowed to ceftriaxone (1/20). Extubated 1/20 but re-intubated 1/21 due to acute hypoxic respiratory failure. Pt underwent Type A aortic valve repair with hypothermic circulatory arrest on 1/22 and was started on VA ECMO due to inability to come off CP bypass intraoperatively. MTP also started due to coagulopathy intra-op. Chest washout (1/23) due to high chest tube output with bleeding controlled from atrial suture line and intercostal artery. MTP stopped post-op.    TODAY'S PROGRESS:   - POD#1 after Type A Aortic dissection repair  - Open chest  - Started on VA ECMO  - Massive transfusion protocol due to coagulopathy post-op  - Emergent chest wash-out in AM    PLAN:  Neurological:  #Perioperative Pain and anxiety   - Propofol gtt   - Precedex gtt   - Fentanyl gtt     Pulmonary:   #Acute hypoxic respiratory failure  #Strep pneumonia with superimposed haemophilus influenza   - Stop ceftriaxone (1/23)   - Hold Mucomyst nebs     Cardiovascular:    #History of AVR  #History of Aortic dissection s/p repair with new type A ascending aortic dissection  #S/P Type A aortic dissection repair with open chest   - Start Cefepime for open chest  #Heart failure   - VA ECMO  #A flutter   - 2  ventricular paced    - Maintain MAP >65   - Diuresis to CVP goal 8-12   - Monitor via flotrak   - Stop Metoprolol 25mg BID     Gastroenterology/Nutrition:  #Shock Liver   - Maintain MAP >65  - NPO for now  - Trend lactic acid  - Neutra-phos  - PPI     Electrolytes/Renal:  #Hypervolemia  #Hypernatremia   - Strict I&Os, ayala to remain   - Trend creatinine   - Diuresis to CVP goal 8-12   - FW to 40 ml/hr     Endocrine:  # Hyperglycemia   - SSI    - Goal to keep BG< 180 mg/dL      ID:  #Strep pneumonia with superimposed haemophilus influenza   - Stop Ceftriaxone (1/23)  #Open Chest   - Vancomycin for post-op ppx   - Start Cefepime, 2g Q8H    Heme:  #Coagulopathy   - Now off MTP   - Hold Heparin gtt    - TEG assay   - Transfuse for Hb >8, Platelets >50    General Cares/Prophylaxis:    DVT Prophylaxis: Heparin gtt (held)  GI Prophylaxis: PPI    Lines/ tubes/ drains:  - ETT, PIVx3, A.line x2, RIJ CVC, LIJ PAC, NG, chest tube x 6, ayala, rectal tube    Disposition: CV ICU    Patient seen, findings and plan discussed with CV ICU staff Intensivist Dr. Danilo Harry, MS3    Resident/Fellow Attestation     I, Rashel Goldberg, was present with the medical student who participated in the service and in the documentation of the note.  I have verified the history and personally performed the physical exam and medical decision making.  I agree with the assessment and plan of care as documented in the note.      (Key findings; Repeat type A Dissection s/p repair - significant blood loss requiring MTP and return to OR in the AM for washout.  Currently requiring Epi and NE for cardiogenic vs distributive vs hemorrhagic shock.  CT output has decreased significantly since return from washout.  Plan to continue to correct coagulopathy and wean pressors.    Acute hypoxic respiratory failure - Open chest, plan to maintain SpO2 > 92% and support with mechanical ventilation until chest closed    Atrial fibrillation/flutter - Now on  amiodarone infusion.    Hypernatremia - increase FW flushes. )    Rashel Goldberg Jr., MD  Surgical ICU Fellow  Pager: [phone]  1/23/2020  3:13 PM    Date of Service (when I saw the patient): 01/23/20    ====================================    TODAY'S PROGRESS:   SUBJECTIVE:   Now POD#1 s/p type A aortic dissection repair with deep hypothermic circulatory arrest. Started on VA ECMO due to CP collapse when off of CP bypass. Coagulopathy intra-operatively with several hours of resuscitation with MTP, given 10 units pRBCs, 12 FPP, 3 platelets, Factor 7, and protamine. Increased chest tube output overnight so pt had chest washout this AM (1/23) with bleeding controlled from atrial suture line and intercostal artery. MTP stopped post-op.     OBJECTIVE:   1. VITAL SIGNS:   Temp:  [96.4  F (35.8  C)-98.2  F (36.8  C)] 97.3  F (36.3  C)  Heart Rate:  [] 92  Resp:  [14] 14  MAP:  [67 mmHg-97 mmHg] 92 mmHg  Arterial Line BP: ()/(41-94) 114/85  FiO2 (%):  [40 %-50 %] 40 %  SpO2:  [97 %-100 %] 97 %  Ventilation Mode: CMV/AC  (Continuous Mandatory Ventilation/ Assist Control)  FiO2 (%): 40 %  Rate Set (breaths/minute): 14 breaths/min  Tidal Volume Set (mL): 500 mL  PEEP (cm H2O): 10 cmH2O  Oxygen Concentration (%): 40 %  Resp: 14      2. INTAKE/ OUTPUT:   I/O last 3 completed shifts:  In: 61020.52 [I.V.:684.52; Other:9706; NG/GT:30]  Out: 91675 [Urine:1385; Emesis/NG output:50; Stool:100; Blood:83674; Chest Tube:4910]    3. PHYSICAL EXAMINATION:   General: Intubated, sedated, supine  HEENT: moist mucous membranes   Neuro: Sedated  Pulm/Resp: Mechanical breath sounds bilaterally  CV: RRR  Abdomen: Soft, distended, unable to assess pain d/t sedation  : ayala catheter in place  MSK/Extremities: peripheral pulses intact, extremities well perfused    4. INVESTIGATIONS:   Arterial Blood Gases   Recent Labs   Lab 01/23/20  1152 01/23/20  1037 01/23/20  0952 01/23/20  0927   PH 7.57* 7.57* 7.53* 7.43   PCO2 39 35 36 45  "  PO2 160* 234* 228* 110*   HCO3 35* 32* 30* 30*     Complete Blood Count   Recent Labs   Lab 01/23/20  1037 01/23/20  0952 01/23/20 0927 01/23/20 0912 01/23/20  0910 01/23/20  0819 01/23/20  0651 01/23/20  0404 01/23/20  0258   WBC  --   --   --   --   --  3.3* 3.3* 3.5* 3.3*   HGB 11.1* 10.4* 11.5* 11.9*  --  9.4* 10.0* 9.4* 9.6*   PLT  --   --   --   --  68* 69* 76* 97* 92*     Basic Metabolic Panel  Recent Labs   Lab 01/23/20 1037 01/23/20 0952 01/23/20 0927 01/23/20 0912 01/23/20 0404 01/23/20 0258 01/23/20  0145 01/22/20  0332   * 155* 156* 156*  --  153* 154* 155*   < > 148*   POTASSIUM 3.7 3.7 3.7 3.8   < > 3.4 3.4 3.0*   < > 4.1   CHLORIDE  --   --   --   --   --  110* 112* 109  --  116*   CO2  --   --   --   --   --  28 25 25  --  30   BUN  --   --   --   --   --  24 23 21  --  30   CR  --   --   --   --   --  0.88 0.84 0.86  --  0.90   * 153* 158* 164*  --  140* 143* 144*   < > 102*    < > = values in this interval not displayed.     Liver Function Tests  Recent Labs   Lab 01/23/20  0910 01/23/20  0819 01/23/20  0651 01/23/20 0404 01/23/20 0258 01/23/20  0145 01/19/20  0329   AST  --   --   --  364* 395* 325*  --  87*   ALT  --   --   --  202* 211* 193*  --  144*   ALKPHOS  --   --   --  40 39* 35*  --  49   BILITOTAL  --   --   --  2.8* 2.7* 2.3*  --  1.6*   ALBUMIN  --   --   --  2.2* 1.8* 1.6*  --  2.8*   INR 0.92 0.91 0.93 0.93 1.91* 1.64*   < > 1.38*    < > = values in this interval not displayed.     Pancreatic Enzymes  No lab results found in last 7 days.  Coagulation Profile  Recent Labs   Lab 01/23/20  0910 01/23/20  0819 01/23/20  0651 01/23/20  0404   INR 0.92 0.91 0.93 0.93   PTT 33 35 41* 45*     Lactate  Invalid input(s): LACTATE    5. RADIOLOGY:     \"EXAM: XR CHEST PORT 1 VW  1/23/2020 3:00 AM    HISTORY:  S/P repair of aortic dissection     COMPARISON: Chest radiograph dated 1/22/2020     FINDINGS: A single AP view of the chest is obtained. Endotracheal tube  tip " "is not well-visualized due to overlying surgical sponge. Gastric  tube courses below the left hemidiaphragm with tip projecting over the  left upper quadrant ECMO cannulae. Right IJ central venous catheter  tip projects over the mid SVC. Left IJ [place]-Mayra catheter tip is  coiled in the right pulmonary artery with tip projecting over the  proximal right pulmonary artery. Mediastinal drains and bilateral  chest tubes. Aortic valve prosthesis. Epicardial pacer wires. Multiple  surgical sponges with known open chest per chart. Surgical clips  project over the right scapula.     Trachea is midline. Enlargement of the cardiomediastinal silhouette  status-post aortic dissection repair. Small right pneumothorax with  chest tube in place. Bilateral pleural effusions, greater on the  right. Patchy bibasilar and perihilar opacities. Subcutaneous  emphysema along the right chest wall and axilla.                                                       IMPRESSION:   In this patient with known open chest:  1. New postoperative changes of aortic dissection repair with multiple  surgical sponges projecting over the mediastinum, consistent with open  postoperative chest.  2. [place]-Mayra catheter is coiled in the right main pulmonary artery and  crosses back toward midline with tip projecting over the proximal  right pulmonary artery. Recommend repositioning.  3. Small right pneumothorax with chest tubes in place.  4. Right greater than left pleural effusions with perihilar and  bibasilar atelectasis.     [Urgent Result: [place]-Mayra catheter position, bilateral pneumothoraces]     Finding was identified on 1/23/2020 3:00 AM.      Dr. Bahena was contacted by Dr. Guillen at 1/23/2020 3:14 AM and  verbalized understanding of the urgent finding.      I have personally reviewed the examination and initial interpretation  and I agree with the findings.     RHODA ANTHONY MD\"    =========================================      "

## 2020-01-23 NOTE — PROGRESS NOTES
ECLS Cannulation Note:    Date: 1/22/2020  Time: 2032    Arterial Cannula: 20 Fr. EOPA In the Aortic tube graft      Venous Cannula: 34 Fr. Right Angle DLP In the RA      ECMO components include:  Cardiohelp SN:  [id number]  Tubing pack #:  [id number]  Oxygenator lot #:  [id number]    Cannula placement was tunneled and visualized via open chest in OR.  Pt placed on ECMO due to hemodynamic instability s/p CPB.  ISTAT and blood cooler are at bedside. Patient's blood type is O+.    RT Carl, RRT  1/23/2020 3:17 AM

## 2020-01-23 NOTE — ANESTHESIA CARE TRANSFER NOTE
Patient: Enmanuel Hernandez    Procedure(s):  EMERGENT INCISION AND DRAINAGE, WOUND, CHEST, WITH IRRIGATION    Diagnosis: Aortic dissection (H) [I71.00]  Diagnosis Additional Information: No value filed.    Anesthesia Type:   No value filed.     Note:  Airway :ETT  Patient transferred to:ICU  Comments: To icu monitored and o2/ambu  Placed on ventilator with previous icu settings  VSS  Report given to icu teamICU Handoff: Call for PAUSE to initiate/utilize ICU HANDOFF, Identified Patient, Identified Responsible Provider, Reviewed the Pertinent Medical History, Discussed Surgical Course, Reviewed Intra-OP Anesthesia Management and Issues during Anesthesia, Set Expectations for Post Procedure Period and Allowed Opportunity for Questions and Acknowledgement of Understanding      Vitals: (Last set prior to Anesthesia Care Transfer)    CRNA VITALS  1/23/2020 1038 - 1/23/2020 1129      1/23/2020             SpO2:  100 %    Resp Rate (set):  14                Electronically Signed By: CHANDLER Trent CRNA  January 23, 2020  11:29 AM

## 2020-01-23 NOTE — OP NOTE
OPERATIVE DATE: 1/23/2020    PRE-OPERATIVE DIAGNOSIS:  1) Type A aortic dissection  2) Previous aortic valve replacement  3) Pneumonia  4) Bleeding  Patient Active Problem List   Diagnosis     Aortic dissection (H)     Heart failure (H)     Ascending aortic aneurysm (H)      POST-OPERATIVE DIAGNOSIS:  1) Type A aortic dissection  2) Previous aortic valve replacement  3) Pneumonia  4) Bleeding      Patient Active Problem List   Diagnosis     Aortic dissection (H)     Heart failure (H)     Ascending aortic aneurysm (H)       PROCEDURE:  1) Chest washout  2) Temporary chest closure    SURGEON: Mitch Weeks MD    ASSISTANT: Osmani Perry MD; Sharif Cisneros PA-C    ANESTHESIA: GETA    ESTIMATED BLOOD LOSS: 2000cc    INDICATIONS:  Mr. SYDNEY CANO is a 74 year old male admitted with type A aortic dissection.  The patient had planned repair yesterday.  Overnight he had excessively high chest tube output.  I recommended to the family take back and chest washout.  Risks and benefits of the operation were explained to the patient and their family including, but not limited to, bleeding, infection, stroke and even death.  They understood these risks and agreed to proceed electively.    OPERATIVE REPORT:  The patient was transferred to the operating room and positioned supine on the OR table.  General anesthesia was initiated by the anesthesia team.  Endotracheal intubation and IV access was already in place. The patients neck, chest, abdomen and bilateral lower extremities were clipped, prepped and draped in sterile fashion.  A pre-procedure time-out was performed confirming the correct patient, correct site and correct procedure.    Previous dressing was removed.  There was active bleeding from the atrial suture line and an intercostal artery.  These were controlled.  The patient was stable on moderate doses of inotropes and vasopressors.  The patient had received multiple doses of blood products and large volume of  fluid resuscitation.  I decided to pack the chest open.  The chest was packed with 6 laps.  An esmarch dressing was sutured in place.  An ioban dressing was applied.    The patient was then transferred from the operating bed to an ICU bed and transferred to the ICU in critical, but stable, condition.    All needle, sponge and instrument counts were correct at the end of the case.    Mitch Weeks  Cardiothoracic Surgery  Pager: [phone]

## 2020-01-23 NOTE — BRIEF OP NOTE
[facility], [place]    Brief Operative Note    Pre-operative diagnosis: Ascending aortic aneurysm (H) [I71.2]  Post-operative diagnosis Same as pre-operative diagnosis    Procedure: Procedure(s):  REDO MEDIAN STERNOTOMY, ON CARDIOPULMONARY BYPASS, ASCENDING AORTIC ANEURYSM REPAIR  Surgeon: Surgeon(s) and Role:     * Mitch Weeks MD - Primary     * Dagmar Gutierrez MD - Assisting     * Osmani Perry MD - Fellow - Assisting   Sharif Cisneros PA-C assisting  Anesthesia: General   Estimated blood loss: 73871  Drains: Numerous drains, 2 ventricular wires  Specimens:   ID Type Source Tests Collected by Time Destination   A : Ascending Aorta and Hematoma Tissue Heart SURGICAL PATHOLOGY EXAM Mitch Weeks MD 1/22/2020  3:38 PM      Findings:   none  Complications: Pt's chest left opened, placed on central ECMO.  Implants:   Implant Name Type Inv. Item Serial No.  Lot No. LRB No. Used   GRAFT VASCUTEK GELWEAVE STRAIGHT [id number] Graft GRAFT VASCUTEK GELWEAVE STRAIGHT [id number] VASCUTEK [id number] N/A 1   GRAFT PERICARDIUM 6X8CM BOVINE E6P8 Bone/Tissue/Biologic GRAFT PERICARDIUM 6X8CM BOVINE E6P8  LEMAITRE VASCULAR IN [id number] N/A 1   GRAFT VASCUTEK GELWEAVE GELATIN IMPREGNATED WOVEN VASCULAR PROSTHESIS [id number] Graft   VASCUTEC [id number] N/A 1   GRAFT PERICARDIUM 6X8CM BOVINE E6P8 Bone/Tissue/Biologic GRAFT PERICARDIUM 6X8CM BOVINE E6P8  LEMAITRE VASCULAR IN [id number] N/A 1   LEAD ARTIRIAL PACING TEMPORARY 53CM [id number] Leads LEAD ARTIRIAL PACING TEMPORARY 53CM [id number]  [employer] Cardiac Pa [id number] N/A 2       PRBC 32 units  FFP 66  Cryo 8  Plt 19  Cell saver 9700   ml

## 2020-01-23 NOTE — PROCEDURES
Patient's [place] was noted to be coiled in the right PA on CXR. Therefore, cardiology was asked to assist with removal of swan. Westland was not able to be pulled with manual traction. Therefore, under Fluro Guidance an 0.018 Platinum Plus wire was placed through the PA port. Once the wire was through the swan, West[place] was able to be retracted and removed. No complications from the procedure. Of note, there was blood in the balloon syringe noted before the procedure. After the swan was pulled, the balloon was likely perforated at sometime prior to procedure to remove swan.      CVICU will replace with a new swan.     Amanda Roach   Cardiology Fellow   Pager: [phone]

## 2020-01-23 NOTE — ANESTHESIA PREPROCEDURE EVALUATION
Anesthesia Pre-Procedure Evaluation    Patient: Enmanuel Hernandez   MRN:     [id number] Gender:   male   Age:    74 year old :      1945        Preoperative Diagnosis: Aortic dissection (H) [I71.00]   Procedure(s):  EMERGENT INCISION AND DRAINAGE, WOUND, CHEST, WITH IRRIGATION     No past medical history on file.   Past Surgical History:   Procedure Laterality Date     REPAIR ANEURYSM ASCENDING AORTA N/A 2020    Procedure: REDO MEDIAN STERNOTOMY, ON CARDIOPULMONARY BYPASS, ASCENDING AORTIC ANEURYSM REPAIR;  Surgeon: Mitch Weeks MD;  Location: [facility] OR                   PHYSICAL EXAM:   Mental Status/Neuro:    Airway: Facies: Feasible  Mallampati: Not Assessed  Mouth/Opening: Not Assessed  TM distance: Not Assessed  Neck ROM: Not Assessed   Respiratory:    CV:    Comments:                      LABS:  CBC:   Lab Results   Component Value Date    WBC 4.8 2020    WBC 3.3 (L) 2020    HGB 10.2 (L) 2020    HGB 11.1 (L) 2020    HCT 29.5 (L) 2020    HCT 28.1 (L) 2020    PLT 84 (L) 2020    PLT 68 (L) 2020     BMP:   Lab Results   Component Value Date     (H) 2020     (H) 2020    POTASSIUM 3.9 2020    POTASSIUM 3.7 2020    CHLORIDE 109 2020    CHLORIDE 110 (H) 2020    CO2 34 (H) 2020    CO2 28 2020    BUN 29 2020    BUN 24 2020    CR 1.07 2020    CR 0.88 2020     (H) 2020     (H) 2020     COAGS:   Lab Results   Component Value Date    PTT 36 2020    INR 1.09 2020    FIBR 223 2020     POC:   Lab Results   Component Value Date     (H) 2020     OTHER:   Lab Results   Component Value Date    PH 7.41 2020    LACT 5.2 (HH) 2020    A1C 5.8 (H) 2020    ELLA 9.7 2020    PHOS 3.6 2020    MAG 2.5 (H) 2020    ALBUMIN 2.2 (L) 2020    PROTTOTAL 4.1 (L) 2020     (H) 2020    AST  "364 (H) 01/23/2020    ALKPHOS 40 01/23/2020    BILITOTAL 2.8 (H) 01/23/2020    .0 (H) 01/20/2020        Preop Vitals    BP Readings from Last 3 Encounters:   01/20/20 133/57    Pulse Readings from Last 3 Encounters:   01/17/20 55      Resp Readings from Last 3 Encounters:   01/23/20 14    SpO2 Readings from Last 3 Encounters:   01/23/20 99%      Temp Readings from Last 1 Encounters:   01/23/20 36.7  C (98.1  F)    Ht Readings from Last 1 Encounters:   01/17/20 1.778 m (5' 10\")      Wt Readings from Last 1 Encounters:   01/21/20 83.4 kg (183 lb 13.8 oz)    Estimated body mass index is 26.38 kg/m  as calculated from the following:    Height as of this encounter: 1.778 m (5' 10\").    Weight as of this encounter: 83.4 kg (183 lb 13.8 oz).     LDA:  Peripheral IV 01/17/20 Left Lower forearm (Active)   Site Assessment WDL 1/23/2020  8:00 AM   Line Status Saline locked 1/23/2020  8:00 AM   Phlebitis Scale 0-->no symptoms 1/23/2020  8:00 AM   Infiltration Scale 0 1/23/2020  8:00 AM   Infiltration Site Treatment Method  None 1/22/2020  8:00 AM   Extravasation? No 1/23/2020  8:00 AM   Number of days: 6       Peripheral IV 01/17/20 Right Upper forearm (Active)   Site Assessment WDL 1/23/2020  8:00 AM   Line Status Saline locked 1/23/2020  8:00 AM   Phlebitis Scale 0-->no symptoms 1/23/2020  8:00 AM   Infiltration Scale 0 1/23/2020  8:00 AM   Infiltration Site Treatment Method  Cold compress 1/19/2020  8:00 PM   Extravasation? No 1/23/2020  8:00 AM   Number of days: 6       Peripheral IV Right Upper forearm (Active)   Site Assessment WDL except;Ecchymotic 1/23/2020  8:00 AM   Line Status Infusing 1/23/2020  8:00 AM   Phlebitis Scale 0-->no symptoms 1/23/2020  8:00 AM   Infiltration Scale 0 1/23/2020  8:00 AM   Extravasation? No 1/23/2020  8:00 AM   Number of days:        Arterial Line 01/17/20 (Active)   Site Assessment WDL Except 1/23/2020 12:00 PM   Line Status Pulsatile blood flow 1/23/2020 12:00 PM   Arterine Line " Cap Change Due 01/26/20 1/21/2020  8:00 PM   Art Line Waveform Appropriate 1/22/2020 12:00 PM   Art Line Interventions Leveled 1/23/2020 12:00 PM   Color/Movement/Sensation Capillary refill less than 3 sec 1/23/2020 12:00 PM   Line Necessity Yes, meets criteria 1/23/2020 12:00 PM   Dressing Type Transparent 1/23/2020 12:00 PM   Dressing Status Clean, dry, intact 1/23/2020 12:00 PM   Dressing Intervention Dressing changed/new dressing 1/22/2020  8:00 AM   Dressing Change Due 01/26/20 1/22/2020  8:00 AM   Number of days: 6       Arterial Line 01/22/20 Radial (Active)   Site Assessment WDL 1/23/2020 12:00 PM   Line Status Other (Comment) 1/23/2020 12:00 PM   Dressing Type Transparent 1/23/2020 12:00 PM   Dressing Status Dried drainage 1/23/2020 12:00 PM   Number of days: 1       CVC Double Lumen 01/23/20 Left Internal jugular (Active)   Site Assessment WDL 1/23/2020 12:00 PM   Dressing Intervention Chlorhexidine sponge;Transparent 1/23/2020 12:00 PM   Dressing Change Due 01/26/20 1/23/2020  4:00 AM   CVC Comment CDI 1/23/2020 12:00 PM   Lumen A - Color BROWN 1/23/2020 12:00 PM   Lumen A - Status infusing 1/23/2020 12:00 PM   Lumen B - Color WHITE 1/23/2020 12:00 PM   Lumen B - Status saline locked 1/23/2020 12:00 PM   Extravasation? No 1/23/2020 12:00 PM   Number of days: 0       CVC Triple Lumen 01/17/20 Right Internal jugular (Active)   Site Assessment WDL 1/23/2020 12:00 PM   Dressing Intervention Chlorhexidine sponge;Transparent 1/23/2020 12:00 PM   Dressing Change Due 01/26/20 1/23/2020  4:00 AM   CVC Comment CDI 1/23/2020 12:00 PM   Lumen A - Color BLUE 1/23/2020 12:00 PM   Lumen A - Status infusing 1/23/2020 12:00 PM   Lumen A - Cap Change Due 01/25/20 1/22/2020 12:00 PM   Lumen B - Color BROWN 1/23/2020 12:00 PM   Lumen B - Status infusing 1/23/2020 12:00 PM   Lumen B - Cap Change Due 01/25/20 1/22/2020 12:00 PM   Lumen C - Color WHITE 1/23/2020 12:00 PM   Lumen C - Status infusing 1/23/2020 12:00 PM   Lumen C  - Cap Change Due 01/25/20 1/22/2020 12:00 PM   Extravasation? No 1/23/2020 12:00 PM   Number of days: 6       Pulmonary Artery Catheter - Single Lumen 01/23/20 0239 Left internal jugular vein (Active)   Dressing other (see comments) 1/23/2020 12:00 PM   Securement catheter securement device utilized 1/23/2020 12:00 PM   PA Catheter Markings (cm) 71 1/23/2020 12:00 PM   Phlebitis 0-->no symptoms 1/23/2020 12:00 PM   Infiltration 0-->no symptoms 1/23/2020 12:00 PM   Waveform flattened 1/23/2020 12:00 PM   Number of days: 0       ETT (adult) 8 (Active)   Secured By Commercial tube slaughter 1/23/2020 12:25 PM   Site Appearance Clean and dry 1/23/2020 12:25 PM   Secured at (cm) to lip 24 cm 1/23/2020 12:25 PM   Site of ET Tube Securement At lip 1/23/2020 12:25 PM   Cuff Pressure - Type minimal leak technique 1/22/2020 11:25 AM   Tube Care/Reposition repositioned tube center of mouth 1/23/2020  8:00 AM   Bite Block Secure and Patent 1/23/2020 12:25 PM   Safety Measures manual resuscitator at bedside 1/23/2020 12:25 PM   ETT Cuff Deflation Leak Test No Leak 1/22/2020 11:25 AM   Number of days: 2       Chest Tube 4 Anterior Mediastinal 36 [place]utanese (Active)   Site Assessment UTV 1/23/2020 12:00 PM   Suction -20 cm H2O 1/23/2020 12:00 PM   Chest Tube Airleak Yes 1/23/2020 12:00 PM   Drainage Description Sanguinous 1/23/2020 12:00 PM   Dressing Status Normal: Clean, Dry & Intact 1/23/2020 12:00 PM   Dressing Intervention Gauze 1/23/2020 12:00 PM   Patency Intervention Tip/Tilt 1/23/2020 12:00 PM   Chest Tube Clamps at Bedside present 1/23/2020 12:00 PM   Container Amount 1660 1/23/2020  2:00 PM   Output (ml) 30 ml 1/23/2020  2:00 PM   Number of days: 1       Chest Tube 5 Anterior Mediastinal 36 [place] (Active)   Site Assessment UTV 1/23/2020 12:00 PM   Suction -20 cm H2O 1/23/2020 12:00 PM   Chest Tube Airleak Yes 1/23/2020 12:00 PM   Drainage Description Sanguinous 1/23/2020 12:00 PM   Dressing Status Normal: Clean, Dry & Intact  1/23/2020 12:00 PM   Dressing Intervention Gauze 1/23/2020 12:00 PM   Patency Intervention Tip/Tilt 1/23/2020 12:00 PM   Chest Tube Clamps at Bedside present 1/23/2020 12:00 PM   Number of days: 1       Chest Tube 1 Anterior Other (Comment) 24 Malay (Active)   Site Assessment UTV 1/23/2020 12:00 PM   Suction -20 cm H2O 1/23/2020 12:00 PM   Chest Tube Airleak Yes 1/23/2020 12:00 PM   Drainage Description Sanguinous 1/23/2020 12:00 PM   Dressing Status Normal: Clean, Dry & Intact 1/23/2020 12:00 PM   Dressing Intervention Gauze 1/23/2020 12:00 PM   Patency Intervention Tip/Tilt 1/23/2020 12:00 PM   Chest Tube Clamps at Bedside present 1/23/2020 12:00 PM   Container Amount 520 1/23/2020  2:00 PM   Output (ml) 80 ml 1/23/2020  2:00 PM   Number of days: 0       Chest Tube 2 Anterior Mediastinal 32 Malay (Active)   Site Assessment UTV 1/23/2020 12:00 PM   Suction -20 cm H2O 1/23/2020 12:00 PM   Chest Tube Airleak Yes 1/23/2020 12:00 PM   Drainage Description Sanguinous 1/23/2020 12:00 PM   Dressing Status Normal: Clean, Dry & Intact 1/23/2020 12:00 PM   Dressing Intervention Gauze 1/23/2020 12:00 PM   Patency Intervention Tip/Tilt 1/23/2020 12:00 PM   Chest Tube Clamps at Bedside present 1/23/2020 12:00 PM   Container Amount 670 1/23/2020  2:00 PM   Output (ml) 40 ml 1/23/2020  2:00 PM   Number of days: 0       Chest Tube 3 Anterior Mediastinal  (Active)   Site Assessment UTV 1/23/2020 12:00 PM   Suction -20 cm H2O 1/23/2020 12:00 PM   Chest Tube Airleak Yes 1/23/2020 12:00 PM   Drainage Description Sanguinous 1/23/2020 12:00 PM   Dressing Status Normal: Clean, Dry & Intact 1/23/2020 12:00 PM   Dressing Intervention Gauze 1/23/2020 12:00 PM   Patency Intervention Tip/Tilt 1/23/2020 12:00 PM   Chest Tube Clamps at Bedside present 1/23/2020 12:00 PM   Number of days: 0       Chest Tube 6 Anterior Mediastinal  (Active)   Site Assessment UTV 1/23/2020 12:00 PM   Suction -20 cm H2O 1/23/2020 12:00 PM   Chest Tube Airleak Yes  1/23/2020 12:00 PM   Drainage Description Sanguinous 1/23/2020 12:00 PM   Dressing Status Normal: Clean, Dry & Intact 1/23/2020 12:00 PM   Dressing Intervention Gauze 1/23/2020 12:00 PM   Patency Intervention Tip/Tilt 1/23/2020 12:00 PM   Chest Tube Clamps at Bedside present 1/23/2020 12:00 PM   Number of days: 0       NG/OG/NJ Tube Nasogastric Right nostril (Active)   Site Description WDL 1/23/2020 12:00 PM   Status Suction-low intermittent 1/23/2020 12:00 PM   Drainage Appearance Bile;Brown 1/23/2020 12:00 PM   Placement Confirmation X-ray 1/23/2020  4:00 AM   [place] (cm marking) at nare/mouth 62 cm 1/23/2020 12:00 PM   Intake (ml) 75 ml 1/23/2020  2:00 PM   Flush/Free Water (mL) 30 mL 1/23/2020 12:00 PM   Container Amount 250 mL 1/23/2020  8:00 AM   Output (ml) 50 ml 1/23/2020  8:00 AM   Number of days: 0       Rectal Tube With balloon (Active)   Balloon fill volume  45 cc 1/22/2020  8:00 AM   Stool Leakage Small 1/22/2020  8:00 AM   Rectal Tube Container Volume 100 ml 1/23/2020  4:00 AM   Rectal Tube Output 0 ml 1/23/2020  8:00 AM   Number of days: 1       Urethral Catheter Latex;Temperature probe 16 fr (Active)   Catheter Care Done;Catheter wipes 1/23/2020 12:00 PM   Collection Container Standard 1/23/2020 12:00 PM   Securement Method Securing device (Describe) 1/23/2020 12:00 PM   Rationale for Continued Use Strict 1-2 Hour I&O;Deep Sedation/Paralysis 1/23/2020 12:00 PM   Urine Output 43 mL 1/23/2020  2:00 PM   Number of days: 1        Assessment:   ASA SCORE: 5 emergent   H&P: History and physical reviewed and following examination; no interval change.    NPO Status: NPO Appropriate     Plan:   Anes. Type:  General   Pre-Medication: None   Induction:  IV (Standard)   Airway: ETT; Oral   Access/Monitoring: PIV; A-Line; Central Access/Port present; PAC   Maintenance: Balanced     Postop Plan:   Postop Pain: Opioids  Postop Sedation/Airway: SECURED AIRWAY likely  Disposition: ICU     PONV Management:   Adult  Risk Factors:, Postop Opioids     CONSENT: Not Applicable   Plan and risks discussed with: Not Applicable   Blood Products: N/a                   Nacho Thrasher MD

## 2020-01-23 NOTE — PROGRESS NOTES
CVTS PROGRESS NOTE  1/23/2020  Enmanuel Hernandez  [id number]  Admitted: 1/17/2020 12:21 PM      CO-MORBIDITIES:   Dissection of aorta, unspecified portion of aorta (H)    ASSESSMENT: Enmanuel Hernandez is a 74 year old male who was admitted for aortic dissection. Patient transferred from [place] 1/16/20 and then transferred to [facility] in [place]. Air lifted to [facility] 1/17/20 where patient was found to be a prohibitively high risk of operation given his history of AVR. Will medically manage and optimize to reassess risk of surgery.  Discussed code status with wife, patient does not want Chest compressions but ok with having an ETT.  Bronchial cultures positive for haemophilus and strep pneumonia, started on Zosyn-Vanc (1/17) and patient narrowed to ceftriaxone (1/20). Extubated 1/20 but re-intubated 1/21 due to acute hypoxic respiratory failure. Pt underwent Type A aortic valve repair with hypothermic circulatory arrest on 1/22 and was started on [facility] ECMO due to inability to come off CP bypass intraoperatively. MTP also started due to coagulopathy intra-op. Chest washout (1/23) due to high chest tube output with bleeding controlled from atrial suture line and intercostal artery. MTP stopped post-op.    TODAY'S PROGRESS:   - POD#1 after Type A Aortic dissection repair  - Open chest  - Started on VA ECMO  - Massive transfusion protocol due to coagulopathy post-op  - Emergent chest wash-out in AM    PLAN:  Neurological:  #Perioperative Pain and anxiety   - Propofol gtt   - Precedex gtt   - Fentanyl gtt     Pulmonary:   #Acute hypoxic respiratory failure  #Strep pneumonia with superimposed haemophilus influenza   - Stop ceftriaxone (1/23)   - Hold Mucomyst nebs     Cardiovascular:    #History of AVR  #History of Aortic dissection s/p repair with new type A ascending aortic dissection  #S/P Type A aortic dissection repair with open chest   - Start Cefepime for open chest  #Heart failure   - VA ECMO  #A flutter   - 2  ventricular paced    - Maintain MAP >65   - Diuresis to CVP goal 8-12   - Monitor via flotrak   - Stop Metoprolol 25mg BID     Gastroenterology/Nutrition:  #Shock Liver   - Maintain MAP >65  - NPO for now  - Trend lactic acid  - Neutra-phos  - PPI     Electrolytes/Renal:  #Hypervolemia  #Hypernatremia   - Strict I&Os, ayala to remain   - Trend creatinine   - Diuresis to CVP goal 8-12   - FW to 40 ml/hr     Endocrine:  # Hyperglycemia   - SSI    - Goal to keep BG< 180 mg/dL      ID:  #Strep pneumonia with superimposed haemophilus influenza   - Stop Ceftriaxone (1/23)  #Open Chest   - Vancomycin for post-op ppx   - Start Cefepime, 2g Q8H    Heme:  #Coagulopathy   - Now off MTP   - Hold Heparin gtt    - TEG assay   - Transfuse for Hb >8, Platelets >50    General Cares/Prophylaxis:    DVT Prophylaxis: Heparin gtt (held)  GI Prophylaxis: PPI    Lines/ tubes/ drains:  - ETT, PIVx3, A.line x2, RIJ CVC, LIJ PAC, NG, chest tube x 6, ayala, rectal tube    Disposition: CV ICU    Patient seen, findings and plan discussed with CVTS fellow    Amanda Harry, MS3    Resident/Fellow Attestation     I, Rashel Goldberg, was present with the medical student who participated in the service and in the documentation of the note.  I have verified the history and personally performed the physical exam and medical decision making.  I agree with the assessment and plan of care as documented in the note.      (Key findings; Repeat type A Dissection s/p repair - significant blood loss requiring MTP and return to OR in the AM for washout.  Currently requiring Epi and NE for cardiogenic vs distributive vs hemorrhagic shock.  CT output has decreased significantly since return from washout.  Plan to continue to correct coagulopathy and wean pressors.    Acute hypoxic respiratory failure - Open chest, plan to maintain SpO2 > 92% and support with mechanical ventilation until chest closed    Atrial fibrillation/flutter - Now on amiodarone  infusion.    Hypernatremia - increase FW flushes. )    Rashel Goldberg Jr., MD  Surgical ICU Fellow  Pager: [phone]  1/23/2020  3:13 PM    Date of Service (when I saw the patient): 01/23/20    ====================================    TODAY'S PROGRESS:   SUBJECTIVE:   Now POD#1 s/p type A aortic dissection repair with deep hypothermic circulatory arrest. Started on [facility] ECMO due to CP collapse when off of CP bypass. Coagulopathy intra-operatively with several hours of resuscitation with MTP, given 10 units pRBCs, 12 FPP, 3 platelets, Factor 7, and protamine. Increased chest tube output overnight so pt had chest washout this AM (1/23) with bleeding controlled from atrial suture line and intercostal artery. MTP stopped post-op.     OBJECTIVE:   1. VITAL SIGNS:   Temp:  [96.4  F (35.8  C)-98.2  F (36.8  C)] 98.1  F (36.7  C)  Heart Rate:  [] 92  Resp:  [14] 14  MAP:  [64 mmHg-97 mmHg] 79 mmHg  Arterial Line BP: ()/(41-94) 92/75  FiO2 (%):  [40 %-50 %] 40 %  SpO2:  [92 %-100 %] 99 %  Ventilation Mode: CMV/AC  (Continuous Mandatory Ventilation/ Assist Control)  FiO2 (%): 40 %  Rate Set (breaths/minute): 14 breaths/min  Tidal Volume Set (mL): 500 mL  PEEP (cm H2O): 10 cmH2O  Oxygen Concentration (%): 40 %  Resp: 14      2. INTAKE/ OUTPUT:   I/O last 3 completed shifts:  In: 87210.86 [I.V.:925.86; Other:85954; NG/GT:165]  Out: 18781 [Urine:1408; Emesis/NG output:100; Stool:100; Blood:94540; Chest Tube:6530]    3. PHYSICAL EXAMINATION:   General: Intubated, sedated, supine  HEENT: moist mucous membranes   Neuro: Sedated  Pulm/Resp: Mechanical breath sounds bilaterally  CV: RRR  Abdomen: Soft, distended, unable to assess pain d/t sedation  : ayala catheter in place  MSK/Extremities: peripheral pulses intact, extremities well perfused    4. INVESTIGATIONS:   Arterial Blood Gases   Recent Labs   Lab 01/23/20  1420 01/23/20  1152 01/23/20  1037 01/23/20  0952   PH 7.41 7.57* 7.57* 7.53*   PCO2 59* 39 35 36   PO2 73*  "160* 234* 228*   HCO3 37* 35* 32* 30*     Complete Blood Count   Recent Labs   Lab 01/23/20  1152 01/23/20  1037 01/23/20  0952 01/23/20 0927 01/23/20  0910 01/23/20  0819 01/23/20  0651 01/23/20  0404   WBC 4.8  --   --   --   --   --  3.3* 3.3* 3.5*   HGB 10.2* 11.1* 10.4* 11.5*   < >  --  9.4* 10.0* 9.4*   PLT 84*  --   --   --   --  68* 69* 76* 97*    < > = values in this interval not displayed.     Basic Metabolic Panel  Recent Labs   Lab 01/23/20  1152 01/23/20  1037 01/23/20 0952 01/23/20 0927  01/23/20  0404 01/23/20  0258 01/23/20  0145   * 155* 155* 156*   < > 153* 154* 155*   POTASSIUM 3.9 3.7 3.7 3.7   < > 3.4 3.4 3.0*   CHLORIDE 109  --   --   --   --  110* 112* 109   CO2 34*  --   --   --   --  28 25 25   BUN 29  --   --   --   --  24 23 21   CR 1.07  --   --   --   --  0.88 0.84 0.86   * 146* 153* 158*   < > 140* 143* 144*    < > = values in this interval not displayed.     Liver Function Tests  Recent Labs   Lab 01/23/20  1152 01/23/20  0910 01/23/20 0819 01/23/20  0651 01/23/20  0404 01/23/20  0258 01/23/20  0145  01/19/20  0329   AST  --   --   --   --  364* 395* 325*  --  87*   ALT  --   --   --   --  202* 211* 193*  --  144*   ALKPHOS  --   --   --   --  40 39* 35*  --  49   BILITOTAL  --   --   --   --  2.8* 2.7* 2.3*  --  1.6*   ALBUMIN  --   --   --   --  2.2* 1.8* 1.6*  --  2.8*   INR 1.09 0.92 0.91 0.93 0.93 1.91* 1.64*   < > 1.38*    < > = values in this interval not displayed.     Pancreatic Enzymes  No lab results found in last 7 days.  Coagulation Profile  Recent Labs   Lab 01/23/20  1152 01/23/20  0910 01/23/20  0819 01/23/20  0651   INR 1.09 0.92 0.91 0.93   PTT 36 33 35 41*     Lactate  Invalid input(s): LACTATE    5. RADIOLOGY:     \"EXAM: XR CHEST PORT 1 VW  1/23/2020 3:00 AM    HISTORY:  S/P repair of aortic dissection     COMPARISON: Chest radiograph dated 1/22/2020     FINDINGS: A single AP view of the chest is obtained. Endotracheal tube  tip is not " "well-visualized due to overlying surgical sponge. Gastric  tube courses below the left hemidiaphragm with tip projecting over the  left upper quadrant ECMO cannulae. Right IJ central venous catheter  tip projects over the mid SVC. Left IJ [employer] catheter tip is  coiled in the right pulmonary artery with tip projecting over the  proximal right pulmonary artery. Mediastinal drains and bilateral  chest tubes. Aortic valve prosthesis. Epicardial pacer wires. Multiple  surgical sponges with known open chest per chart. Surgical clips  project over the right scapula.     Trachea is midline. Enlargement of the cardiomediastinal silhouette  status-post aortic dissection repair. Small right pneumothorax with  chest tube in place. Bilateral pleural effusions, greater on the  right. Patchy bibasilar and perihilar opacities. Subcutaneous  emphysema along the right chest wall and axilla.                                                       IMPRESSION:   In this patient with known open chest:  1. New postoperative changes of aortic dissection repair with multiple  surgical sponges projecting over the mediastinum, consistent with open  postoperative chest.  2. [employer] catheter is coiled in the right main pulmonary artery and  crosses back toward midline with tip projecting over the proximal  right pulmonary artery. Recommend repositioning.  3. Small right pneumothorax with chest tubes in place.  4. Right greater than left pleural effusions with perihilar and  bibasilar atelectasis.     [Urgent Result: [employer] catheter position, bilateral pneumothoraces]     Finding was identified on 1/23/2020 3:00 AM.      Dr. Bahena was contacted by Dr. Guillen at 1/23/2020 3:14 AM and  verbalized understanding of the urgent finding.      I have personally reviewed the examination and initial interpretation  and I agree with the findings.     RHODA ANTHONY MD\"    =========================================      "

## 2020-01-23 NOTE — ANESTHESIA CARE TRANSFER NOTE
"Patient: Enmanuel Hernandez    Procedure(s):  REDO MEDIAN STERNOTOMY, ON CARDIOPULMONARY BYPASS, ASCENDING AORTIC ANEURYSM REPAIR    Diagnosis: Ascending aortic aneurysm (H) [I71.2]  Diagnosis Additional Information: No value filed.    Anesthesia Type:   No value filed.     Note:  Airway :ETT  Patient transferred to:ICU  Comments: Patient to ICU ETT, O2, Hand ventilation, ASA Standard monitors, sedation, placed on vent on arrival to room, RN at bedside.  Pt. On ECMO, currently on massive transfusion protocol.  Chest tube 1000mL out in last 30\".      Vitals: (Last set prior to Anesthesia Care Transfer)    CRNA VITALS  1/23/2020 0109 - 1/23/2020 0139      1/23/2020             Resp Rate (observed):  (!) 1                Electronically Signed By: CHANDLER Núñez CRNA  January 23, 2020  1:39 AM  "

## 2020-01-24 NOTE — CONSULTS
[facility]  Palliative Care Consultation Note    Patient: Enmanuel Hernandez  Date of Admission:  1/17/2020    Requesting Clinician / Team: CT Surg  Reason for consult: Patient and family support in setting of AAA repair, ECMO    Recommendations:    Today I met with patient, wife, 1 daughter.  Introduced the role of palliative care services in the setting of Enmanuel's current condition.  Will follow as indicated.    Family asks to see the palliative care , and we will arrange this.    Family OK with entubation but not attempts at cardiac resuscitation    These recommendations have been discussed with Primary team.      Thank you for the opportunity to participate in the care of this patient and family. Our team: will continue to follow.     During regular M-F work hours -- if you are not sure who specifically to contact -- please contact us by sending a text page to our team consult pager at [phone].    After regular work hours and on weekends/holidays, you can call our answering service at [phone]. Also, who's on call for us is available in [employer].   Simon Arauz MD  Palliative Medicine Consult Team  Pager: [phone]   TT: 61 minutes, with > 50% spent in C/C/E patient/family/care teams re: GOC, POC, Sx management. [id number]      Assessments:  74 year old male who was admitted for aortic dissection, from [facility] on 1/17/20 where patient was found to be a prohibitively high risk of operation given his history of AVR. . Extubated 1/20 but re-intubated 1/21 due to acute hypoxic respiratory failure. Pt underwent Type A aortic valve repair with hypothermic circulatory arrest on 1/22 and was started on VA ECMO due to inability to come off CP bypass intraoperatively. MTP also started due to coagulopathy intra-op. Chest washout (1/23)    Today, the patient was seen for:  ECMO, AAA, family support    Prognosis, Goals, & Planning:       Functional Status just prior to hospitalization: 0 (Fully active, able to carry on all activities without restriction) Still works part-time, active at his cabin      Prognosis, Goals, and/or Advance Care Planning were addressed today: Yes        Summary/Comments: Restorative cares      Patient's decision making preferences: shared with support from loved ones          Patient has decision-making capacity today for complex decisions: No            I have concerns about the patient/family's health literacy today: No           Patient has a completed Health Care Directive: No.       Code status: DNR but accepting of entubation    Coping, Meaning, & Spirituality:   Mood, coping, and/or meaning in the context of serious illness were addressed today: Yes  Summary/Comments: [place] community is important to pt and family.  Their community  is actively involved.    Social:     , retired jose l, lives in [place] and has a remote cabin which is important to him.  Member of [employer] community.  Grandchildren are important to him. Independent in ADL's, still works occasionally as domingo.    History of Present Illness:  History gathered today from: family/loved ones, medical chart, medical team members, unit team members    Key Palliative Symptom Data:  We are not helping to manage these symptoms currently in this patient.    ROS:  Comprehensive ROS is reviewed and is negative except as here & per HPI: Pt unable to participate in ROS 2/2 entubation/sedation,     Past Medical History:  No past medical history on file.  AVR    Past Surgical History:  Past Surgical History:   Procedure Laterality Date     REPAIR ANEURYSM ASCENDING AORTA N/A 2020    Procedure: REDO MEDIAN STERNOTOMY, ON CARDIOPULMONARY BYPASS, ASCENDING AORTIC ANEURYSM REPAIR;  Surgeon: Mitch Weeks MD;  Location:  OR     Family History:  No family history on file. Parents      Allergies:  Allergies    Allergen Reactions     Cephalexin Rash     MAR from [facility] noted rash as 'severe'     Fluorouracil Unknown     Warfarin Unknown     TEVA BRAND ONLY     Clindamycin Rash     Penicillins Rash        Medications:  I have reviewed this patient's medication profile and medications from this hospitalization.   Precedex CIVI  EPI CIVI  Fentanyl CIVI  Gabapentin 100 mg TID    Physical Exam:  Vital Signs: Temp: 98.4  F (36.9  C) Temp src: Bladder BP: (!) 86/67 Pulse: 86 Heart Rate: 89 Resp: 12 SpO2: 99 % O2 Device: Mechanical Ventilator    Weight: 205 lbs 3.97 oz   GEN: Entubated, sedated, ECMO    Data reviewed:  ROUTINE ICU LABS (Last four results)  CMP  Recent Labs   Lab 01/24/20  1006 01/24/20  0328 01/23/20  2147 01/23/20  1759 01/23/20  1557  01/23/20  0651 01/23/20  0404 01/23/20  0258 01/23/20  0145  01/19/20  0329   * 152* 155* 155* 154*   < >  --  153* 154* 155*   < > 144   POTASSIUM 4.8 4.8 4.6  --  4.6   < > 3.7 3.4 3.4 3.0*   < > 4.0   CHLORIDE 110* 109 110*  --  110*   < >  --  110* 112* 109   < > 118*   CO2 38* 40* 38*  --  35*   < >  --  28 25 25   < > 21   ANIONGAP 5 3 7  --  9   < >  --  15* 17* 21*   < > 5   * 139* 129*  --  120*   < >  --  140* 143* 144*   < > 124*   BUN 58* 47* 43*  --  33*   < >  --  24 23 21   < > 24   CR 2.12* 1.82* 1.61*  --  1.34*   < >  --  0.88 0.84 0.86   < > 0.86   GFRESTIMATED 29* 35* 41*  --  51*   < >  --  83 84 84   < > 84   GFRESTBLACK 34* 41* 47*  --  59*   < >  --  >90 >90 >90   < > >90   ELLA 8.0* 8.1* 8.6  --  9.0   < >  --  10.6* 14.7* 9.1   < > 7.6*   MAG  --  2.7* 2.7*  --   --   --  2.5*  --  2.0 2.0   < > 2.2   PHOS  --  7.6*  --   --   --   --  3.6  --  4.6* 5.5*   < > 2.4*   PROTTOTAL  --   --   --   --   --   --   --  4.1* 3.5* 3.4*  --  5.1*   ALBUMIN  --  2.3*  --   --   --   --   --  2.2* 1.8* 1.6*  --  2.8*   BILITOTAL  --   --   --   --   --   --   --  2.8* 2.7* 2.3*  --  1.6*   ALKPHOS  --   --   --   --   --   --   --  40 39* 35*  --  49    AST  --   --   --   --   --   --   --  364* 395* 325*  --  87*   ALT  --  185*  --   --   --   --   --  202* 211* 193*  --  144*    < > = values in this interval not displayed.     CBC  Recent Labs   Lab 01/24/20  1006 01/24/20  0328 01/23/20 2147 01/23/20  1557   WBC 16.6* 14.6* 11.4* 7.6   RBC 2.75* 2.89* 2.89* 3.12*   HGB 8.2* 8.7* 8.6* 9.3*   HCT 24.6* 25.9* 25.5* 27.1*   MCV 90 90 88 87   MCH 29.8 30.1 29.8 29.8   MCHC 33.3 33.6 33.7 34.3   RDW 15.5* 15.0 14.6 14.6   PLT 79* 83* 98* 97*     INR  Recent Labs   Lab 01/24/20  1006 01/24/20 0328 01/23/20 2147 01/23/20  1557   INR 1.38* 1.43* 1.33* 1.28*     Arterial Blood Gas  Recent Labs   Lab 01/24/20  1006 01/24/20  0729 01/24/20  0552 01/24/20  0328   PH 7.47* 7.40 7.42 7.41   PCO2 54* 63* 52* 62*   PO2 190* 173* 194* 144*   HCO3 39* 39* 33* 40*   O2PER 60 60 60 60

## 2020-01-24 NOTE — PHARMACY-CONSULT NOTE
Pharmacy Tube Feeding Consult    Medication reviewed for administration by feeding tube and for potential food/drug interactions.    Recommendation: Recommend changing the following medications to a liquid dosage form: gabapentin.     Pharmacy will continue to follow as new medications are ordered.    Mandie Sarmiento, PharmD  January 24, 2020

## 2020-01-24 NOTE — PROGRESS NOTES
CLINICAL NUTRITION SERVICES - REASSESSMENT NOTE     Nutrition Prescription    Malnutrition Status:    Does not meet criteria     Interventions by Registered Dietitian (RD):  - Plan for postpyloric feeding tube placement by Radiology today.   - Begin Nepro @ 10 ml/hr. Adv by 15 ml q8h to goal @ 55 ml/hr.   - Prosource (1 pkt TID).     Nepro @ 55 ml/hr + Prosource (1 pkt TID) to provide 2496 kcals (29 kcal/kg/day), 140 g PRO (1.6 g/kg/day), 964 ml free H2O, 213 g CHO and 17 g Fiber daily.     EVALUATION OF THE PROGRESS TOWARD GOALS   Diet: NPO  Nutrition Support: None   Intake: Previously tolerated TF at goal (Impact 1.5 @ 60 ml/hr) via OGT. No significant kcal/protein intake over the past 3 days as TF stopped after brief extubation (1/20) and have not been restarted since re-intubation (1/21).        NEW FINDINGS   Neuro: Propofol   CV: VA ECMO, open chest   Resp: Intubated    GI: Having stool output on bowel regimen.   Renal: Cr 1.82, BUN 47, Na 152, K 4.8, Phos 7.6   Skin: Jefferson score 9.   Supplementation: Certavite daily     MALNUTRITION  % Intake: Decreased intake does not meet criteria  % Weight Loss: None noted, difficult to assess with fluctuation in fluid status   Subcutaneous Fat Loss: None observed  Muscle Loss: None observed; decreased tone in LE  Fluid Accumulation/Edema: Does not meet criteria  Malnutrition Diagnosis: Patient does not meet two of the established criteria necessary for diagnosing malnutrition    Previous Goals   Total avg nutritional intake to meet a minimum of 25 kcal/kg and 1.5 g PRO/kg daily (per dosing wt 85 kg).  Evaluation: Not met consistently     Previous Nutrition Diagnosis  Inadequate protein-energy intake  Evaluation: No change    CURRENT NUTRITION DIAGNOSIS  Inadequate protein-energy intake related to NPO while intubated, without nutrition support as evidenced by no significant protein or energy intake in past 3 days       INTERVENTIONS  Implementation  Collaboration with  other providers - Nutrition plan discussed on rounds   Enteral Nutrition - Initiate  Nutrition Education - Received provider consult for nutrition education with comments post op cardiovascular surgery (automatic consult on post-op order set). Nutrition education not indicated.    Goals  Total avg nutritional intake to meet a minimum of 25 kcal/kg and 1.5 g PRO/kg daily (per dosing wt 85 kg)    Monitoring/Evaluation  Progress toward goals will be monitored and evaluated per protocol.    Isabella Saenz RD, LD  [id number]  Pgr: 85

## 2020-01-24 NOTE — PHARMACY-VANCOMYCIN DOSING SERVICE
Pharmacy Empiric Dose Change Per Policy - vancomycin  Original Dose Ordered: 1500 mg IV q24h  Dose Changed To: intermittent dosing  This dose change was based on the pharmacist's assessment of this patient's age, weight, concurrent drug therapy, treatment goals, whether patient's creatinine clearance adequately indicates renal function (factoring in age, muscle mass, fluid and clinical status), and, if applicable, prior pharmacokinetic data.      Estimated Creatinine Clearance: 39.6 mL/min (A) (based on SCr of 1.82 mg/dL (H)).  Will continue to follow and modify dosage according to levels, organ function and clinical condition    Luis Victoria, CricketD

## 2020-01-24 NOTE — PROGRESS NOTES
ECMO Shift Summary:      ECMO Equipment:  Console serial number: [id number]  Circuit Lot number: [id number]  Oxygenator Lot number: [id number]        Patient remains on VA ECMO, all equipment is functioning and alarms are appropriately set. RPM's 3500 with flow range 4.34-4.39 L/min. Sweep gas is at 0.5 LPM and FiO2 70%. Circuit remains free of air, clot and fibrin. Cannulas are secure with no bleeding from site. Extremities are warm to touch. Suctioned ETT for scant secretions.    Significant Shift Events: none    Vent settings:  Ventilation Mode: CMV/AC  (Continuous Mandatory Ventilation/ Assist Control)  FiO2 (%): 60 %  Rate Set (breaths/minute): 12 breaths/min  Tidal Volume Set (mL): 500 mL  PEEP (cm H2O): 10 cmH2O  Oxygen Concentration (%): 60 %  Resp: 16  .    Heparin is off, ACT range 128-138.    Urine output is as charted, blood loss was minimal. No product given.      Intake/Output Summary (Last 24 hours) at 1/24/2020 0530  Last data filed at 1/24/2020 0500  Gross per 24 hour   Intake 78324.19 ml   Output 7462 ml   Net 26498.19 ml       ECHO:  No results found for this or any previous visit.No results found for this or any previous visit.    CXR:  Recent Results (from the past 24 hour(s))   XR Fluoro Port Time 0/1 Hour    Narrative    This exam was marked as non-reportable because it will not be read by a   radiologist or a [facility] non-radiologist provider.                 Labs:  Recent Labs   Lab 01/24/20  0328 01/23/20  2350 01/23/20  2147 01/23/20  1959   PH 7.41 7.42 7.52* 7.50*   PCO2 62* 57* 48* 50*   PO2 144* 175* 56* 70*   HCO3 40* 37* 39* 39*   O2PER 60 60 40 40       Lab Results   Component Value Date    HGB 8.7 (L) 01/24/2020    PHGB 40 (H) 01/24/2020    PLT 83 (L) 01/24/2020    FIBR 251 01/24/2020    INR 1.43 (H) 01/24/2020    PTT 33 01/24/2020    DD 11.9 (H) 01/24/2020    AXA <0.10 01/24/2020    ANTCH 49 (L) 01/23/2020         Plan is to remain on ECMO support at this time.      Dayron Colbert,  RT  1/24/2020 5:30 AM

## 2020-01-24 NOTE — PLAN OF CARE
Major Shift Events: carla on ECMO, 2 unit PRBC, 2 unit platelet, 500 albumin, 500 LR given per ECMO protocol, PPFT placed, TF started  Plan: continue to monitor  For vital signs and complete assessments, please see documentation flowsheets.

## 2020-01-24 NOTE — PLAN OF CARE
Major Shift Events: CT output decreasing overnight, see flowsheet. Hgb stable in the 8 range. RR on vent decreased to 12 r/t alkalosis. FiO2 on vent increased to 60% r/t possible mixing cloud, improved ABG's since. VA ECMO continued, see ECMO progress note.   Plan: Continue to monitor.   For vital signs and complete assessments, please see documentation flowsheets.

## 2020-01-24 NOTE — PROGRESS NOTES
SBAR: RN/WOC unable to assess sacral skin yesterday due to pt condition and unable to turn.  Skin was assessed by Staff when able to turn.  Per staff skin intact and blanchable, mepilex placed for prevention. Will follow up next week for regular prevention visit.  Please feel free to contact WOC/place new consult if skin changes prior to next visit.     Marva Swartz RN BSN CWOCN

## 2020-01-24 NOTE — PROGRESS NOTES
CVTS PROGRESS NOTE  1/24/2020  Enmanuel Hernandez  [id number]  Admitted: 1/17/2020 12:21 PM      CO-MORBIDITIES:   Dissection of aorta, unspecified portion of aorta (H)    ASSESSMENT: Enmanuel Hernandez is a 74 year old male who was admitted for aortic dissection. Patient transferred from [place] 1/16/20 and then transferred to [facility] in [place]. Air lifted to [facility] 1/17/20 where patient was found to be a prohibitively high risk of operation given his history of AVR. Will medically manage and optimize to reassess risk of surgery.  Discussed code status with wife, patient does not want Chest compressions but ok with having an ETT.  Bronchial cultures positive for haemophilus and strep pneumonia, started on Zosyn-Vanc (1/17) and patient narrowed to ceftriaxone (1/20). Extubated 1/20 but re-intubated 1/21 due to acute hypoxic respiratory failure. Pt underwent Type A aortic valve repair with hypothermic circulatory arrest on 1/22 and was started on VA ECMO due to inability to come off CP bypass intraoperatively. MTP also started due to coagulopathy intra-op. Chest washout (1/23). MTP stopped post-op. Back to OR tentatively on 1/26.    TODAY'S PROGRESS:   - POD#2 after Type A Aortic dissection repair, open chest, VA ECMO    PLAN:  Neurological:  #Perioperative Pain and anxiety   - Propofol gtt   - Precedex gtt   - Fentanyl gtt     Pulmonary:   #Acute hypoxic respiratory failure  #Strep pneumonia with superimposed haemophilus influenza   - Stop ceftriaxone (1/23)   - Hold Mucomyst nebs     Cardiovascular:    #History of AVR  #History of Aortic dissection s/p repair with new type A ascending aortic dissection  #S/P Type A aortic dissection repair with open chest   - Start Cefepime for open chest  #Heart failure   - VA ECMO   - Wean Flolan  #A flutter   - 2 ventricular paced    - Maintain MAP >65   - Diuresis to CVP goal 8-12   - Monitor via flotrak   - Stop Metoprolol 25mg BID     Gastroenterology/Nutrition:  #Shock  Liver   - Maintain MAP >65  - NPO for now  - Trend lactic acid  - Neutra-phos  - PPI  - Place NJ today     Electrolytes/Renal:  #Hypervolemia   - Acetazolamide, 1g once  #Hypernatremia   - Strict I&Os, ayala to remain   - Trend creatinine   - Diuresis to CVP goal 8-12   - FW to 40 ml/hr     Endocrine:  # Hyperglycemia   - SSI    - Goal to keep BG< 180 mg/dL    ID:  #Strep pneumonia with superimposed haemophilus influenza   - Stop Ceftriaxone (1/23)  #Open Chest   - Vancomycin for post-op ppx   - Start Cefepime, 2g Q8H    Heme:  #Coagulopathy   - Now off MTP   - Hold Heparin gtt    - TEG assay   - Transfuse for Hb >8, Platelets >50    General Cares/Prophylaxis:    DVT Prophylaxis: Heparin gtt (held)  GI Prophylaxis: PPI    Lines/ tubes/ drains:  - ETT, PIVx3, A.line x2, RIJ CVC, LIJ PAC, NG, chest tube x 6, ayala, rectal tube   - Remove L A. Line, remove R IJ    Disposition: CV ICU    Patient seen, findings and plan discussed with CVTS Fellow    Amanda Harry, MS3    Resident/Fellow Attestation   I, Rashel Goldberg, was present with the medical student who participated in the service and in the documentation of the note.  I have verified the history and personally performed the physical exam and medical decision making.  I agree with the assessment and plan of care as documented in the note.      (Key findings; Repeat type A Dissection s/p repair - significant blood loss requiring MTP and return to OR yesterday for washout.  Currently requiring Epi and NE for cardiogenic vs distributive vs hemorrhagic shock.  CT output has decreased significantly since return from washout.  Plan to continue to wean pressors.     Acute hypoxic respiratory failure - Open chest, plan to maintain SpO2 > 92% and support with mechanical ventilation until chest closed     Atrial fibrillation/flutter - Now on amiodarone infusion.     Hypernatremia - Improved with increased FW flushes. )    Rashel Goldberg Jr., MD  Surgical ICU Fellow  Pager:  [phone]  1/24/2020  1:28 PM    Date of Service (when I saw the patient): 01/24/20    ====================================    TODAY'S PROGRESS:   SUBJECTIVE:   Now POD#2 s/p type A aortic dissection repair with deep hypothermic circulatory arrest. No acute events overnight.     OBJECTIVE:   1. VITAL SIGNS:   Temp:  [97.7  F (36.5  C)-98.4  F (36.9  C)] 98.4  F (36.9  C)  Pulse:  [86] 86  Heart Rate:  [66-98] 66  Resp:  [12-18] 12  BP: (86)/(67) 86/67  MAP:  [60 mmHg-285 mmHg] 79 mmHg  Arterial Line BP: ()/(28-78) 104/69  FiO2 (%):  [40 %-60 %] 60 %  SpO2:  [92 %-100 %] 98 %  Ventilation Mode: CMV/AC  (Continuous Mandatory Ventilation/ Assist Control)  FiO2 (%): 60 %  Rate Set (breaths/minute): 12 breaths/min  Tidal Volume Set (mL): 500 mL  PEEP (cm H2O): 10 cmH2O  Oxygen Concentration (%): 40 %  Resp: 12      2. INTAKE/ OUTPUT:   I/O last 3 completed shifts:  In: 47765.29 [I.V.:1612.29; Other:36434; NG/GT:775]  Out: 6962 [Urine:952; Emesis/NG output:200; Stool:100; Blood:2000; Chest Tube:3710]    3. PHYSICAL EXAMINATION:   General: Intubated, sedated, supine  HEENT: moist mucous membranes   Neuro: Sedated  Pulm/Resp: Mechanical breath sounds bilaterally  CV: RRR  Abdomen: Soft, distended, unable to assess pain d/t sedation  : ayala catheter in place  MSK/Extremities: peripheral pulses intact, extremities well perfused    4. INVESTIGATIONS:   Arterial Blood Gases   Recent Labs   Lab 01/24/20  1214 01/24/20  1006 01/24/20  0729 01/24/20  0552   PH 7.48* 7.47* 7.40 7.42   PCO2 51* 54* 63* 52*   PO2 130* 190* 173* 194*   HCO3 38* 39* 39* 33*     Complete Blood Count   Recent Labs   Lab 01/24/20  1006 01/24/20 0328 01/23/20 2147 01/23/20  1557   WBC 16.6* 14.6* 11.4* 7.6   HGB 8.2* 8.7* 8.6* 9.3*   PLT 79* 83* 98* 97*     Basic Metabolic Panel  Recent Labs   Lab 01/24/20  1006 01/24/20 0328 01/23/20 2147 01/23/20  1759 01/23/20  1557   * 152* 155* 155* 154*   POTASSIUM 4.8 4.8 4.6  --  4.6  "  CHLORIDE 110* 109 110*  --  110*   CO2 38* 40* 38*  --  35*   BUN 58* 47* 43*  --  33*   CR 2.12* 1.82* 1.61*  --  1.34*   * 139* 129*  --  120*     Liver Function Tests  Recent Labs   Lab 01/24/20  1006 01/24/20  0328 01/23/20  2147 01/23/20  1557  01/23/20  0404 01/23/20  0258 01/23/20  0145  01/19/20  0329   AST  --   --   --   --   --  364* 395* 325*  --  87*   ALT  --  185*  --   --   --  202* 211* 193*  --  144*   ALKPHOS  --   --   --   --   --  40 39* 35*  --  49   BILITOTAL  --   --   --   --   --  2.8* 2.7* 2.3*  --  1.6*   ALBUMIN  --  2.3*  --   --   --  2.2* 1.8* 1.6*  --  2.8*   INR 1.38* 1.43* 1.33* 1.28*   < > 0.93 1.91* 1.64*   < > 1.38*    < > = values in this interval not displayed.     Pancreatic Enzymes  No lab results found in last 7 days.  Coagulation Profile  Recent Labs   Lab 01/24/20  1006 01/24/20  0328 01/23/20 2147 01/23/20  1557   INR 1.38* 1.43* 1.33* 1.28*   PTT 33 33 35 36     Lactate  Invalid input(s): LACTATE    5. RADIOLOGY:     \"EXAM: XR CHEST PORT 1 VW  1/24/2020 2:20 AM      HISTORY:  ECMO     COMPARISON: Chest radiograph dated 1/23/2020     FINDINGS: Frontal views of the chest. Endotracheal tube tip projects  over the midthoracic trachea. Gastric tube courses below left  hemidiaphragm with tip off the field-of-view. Aortic valve prosthesis.  Postoperative changes of aortic dissection repair. Right IJ central  venous catheter tip projects over the low SVC. Left IJ sheath tip  projects over the left innominate vein. Stable ECMO cannulae,  mediastinal drains, and chest tubes. Multiple surgical sponges project  over the chest. [place]-Mayra catheter is removed.      Slightly decreased prominence of the cardiomediastinal silhouette.  Right greater than left pleural effusion, decreased from prior.  Prominent interstitial markings. Patchy lower lobe opacities.  Subcutaneous emphysema of the right chest wall.                                                                    " "  IMPRESSION: 1. Open chest status-post aortic dissection repair with  multiple surgical sponges projecting over the mediastinum. Stable  position of the ECMO cannulae.  2. [place]-Mayra catheter is removed. Additional support devices as above.  3. Decreased pleural effusions with right greater than left  interstitial and patchy airspace opacities.     I have personally reviewed the examination and initial interpretation  and I agree with the findings.     MARIO GRISSOM MD\"    =========================================      "

## 2020-01-25 NOTE — PROGRESS NOTES
ECMO Shift Summary:      ECMO Equipment:  Console serial number: [id number]  Circuit Lot number: [id number]  Oxygenator Lot number:[id number]        Patient remains on VA ECMO, all equipment is functioning and alarms are appropriately set. RPM's 3500 with flow range 4.27-4.44 L/min. Sweep gas is at 3 LPM and FiO2 70%. Circuit remains free of air, clot and fibrin. Cannulas are secure with no bleeding from site. Extremities are warm. Suctioned ETT for small amount of secretions.    Significant Shift Events: none    Vent settings:  Ventilation Mode: CMV/AC  (Continuous Mandatory Ventilation/ Assist Control)  FiO2 (%): 40 %  Rate Set (breaths/minute): 12 breaths/min  Tidal Volume Set (mL): 500 mL  PEEP (cm H2O): 10 cmH2O  Oxygen Concentration (%): 40 %  Resp: 12  .    Heparin is not running, ACT range 126-130.    Urine output is as charted per nursing, blood loss was oozing from chest tubes. Product given included 2 unit(s) PLTs, 2u PRBCs and 500 of albumin.      Intake/Output Summary (Last 24 hours) at 1/24/2020 1814  Last data filed at 1/24/2020 1800  Gross per 24 hour   Intake 4850.8 ml   Output 2887 ml   Net 1963.8 ml       ECHO:  No results found for this or any previous visit.No results found for this or any previous visit.    CXR:  Recent Results (from the past 24 hour(s))   XR Chest Port 1 View    Narrative    EXAM: XR CHEST PORT 1 VW  1/24/2020 2:20 AM     HISTORY:  ECMO    COMPARISON: Chest radiograph dated 1/23/2020    FINDINGS: Frontal views of the chest. Endotracheal tube tip projects  over the midthoracic trachea. Gastric tube courses below left  hemidiaphragm with tip off the field-of-view. Aortic valve prosthesis.  Postoperative changes of aortic dissection repair. Right IJ central  venous catheter tip projects over the low SVC. Left IJ sheath tip  projects over the left innominate vein. Stable ECMO cannulae,  mediastinal drains, and chest tubes. Multiple surgical sponges project  over the chest. [place]-Mayra  catheter is removed.     Slightly decreased prominence of the cardiomediastinal silhouette.  Right greater than left pleural effusion, decreased from prior.  Prominent interstitial markings. Patchy lower lobe opacities.  Subcutaneous emphysema of the right chest wall.      Impression    IMPRESSION: 1. Open chest status-post aortic dissection repair with  multiple surgical sponges projecting over the mediastinum. Stable  position of the ECMO cannulae.  2. [place]-Mayra catheter is removed. Additional support devices as above.  3. Decreased pleural effusions with right greater than left  interstitial and patchy airspace opacities.    I have personally reviewed the examination and initial interpretation  and I agree with the findings.    MARIO GRISSOM MD   XR Feeding Tube Placement    Addendum: 1/24/2020    I, THONY MARTIN MD, attest that I was present for all critical  portions of the procedure and was immediately available to provide  guidance and assistance during the remainder of the procedure.    THONY MARTIN MD      Narrative    Feeding tube placement.    Comparison:  none.    History: Feeding tube needed for nutrition.     Fluoroscopy time:  4.8 minutes    Technique: After injection of Xylocaine gel into the right nostril, a  feeding tube was advanced under fluoroscopic guidance.    Findings: Multiple overlying lines and tubes demonstrated. The feeding  tube is advanced with the tip in the second portion of the duodenum. A  small amount of barium was injected to define anatomy.      Impression    Impression: Uncomplicated feeding tube placement with tip in the  second portion of the duodenum.    I have personally reviewed the examination and initial interpretation  and I agree with the findings.    THONY MARTIN MD       Labs:  Recent Labs   Lab 01/24/20  1743 01/24/20  1600 01/24/20  1410 01/24/20  1214   PH 7.47* 7.41 7.43 7.48*   PCO2 50* 57* 55* 51*   PO2 84 100 115* 130*   HCO3 36* 36* 37* 38*    O2PER 40.0 40.0 40 40       Lab Results   Component Value Date    HGB 8.1 (L) 01/24/2020    PHGB 40 (H) 01/24/2020    PLT 91 (L) 01/24/2020    FIBR 326 01/24/2020    INR 1.44 (H) 01/24/2020    PTT 34 01/24/2020    DD 12.6 (H) 01/24/2020    AXA <0.10 01/24/2020    ANTCH 64 (L) 01/24/2020         Plan is continue to support.      Renea Flores, RT  1/24/2020 6:14 PM

## 2020-01-25 NOTE — PLAN OF CARE
Major Shift Events:  4 units platelets given, 1 PRBC given. Epi/Levo weaned up and down (see flowheets). Pt remains sedated on VA ECMO wth open chest. Tube feeding titrated up to 25ml/hr, next increase to 40 at 0800 (goal 55ml/hr).  Plan: Possible OR Sunday for washout. Cotinue to monitor and notify MD of questions or concerns.  For vital signs and complete assessments, please see documentation flowsheets.

## 2020-01-25 NOTE — PROGRESS NOTES
ECMO Shift Summary:      ECMO Equipment:  Console serial number: [id number]  Circuit Lot number: [id number]  Oxygenator Lot number: [id number]    Patient remains on VA ECMO, all equipment is functioning and alarms are appropriately set. RPM's 3500 with flow range 4.14-4.36 L/min. Sweep gas is at 2 LPM and FiO2 60%. There remains significant collections of clot/fibrin at each corner of the oxygenator with fibrin webs extending down through the oxygenator. Cannulas are secure with no bleeding from the site. Extremities are warm to touch. Suctioned ETT for a small amount of thick, ann-marie secretions.    Significant Shift Events: Since the patient's chest tube output was stable and there were significant collections of clot/fibrin throughout the oxygenator, heparin was started with an ACT goal range of 140-160 (no boluses). 4 units of platelets and one unit of RBCs were given for low lab values. Chest tube output remains low after heparin was started.    Vent settings:  Ventilation Mode: CMV/AC  (Continuous Mandatory Ventilation/ Assist Control)  FiO2 (%): 40 %  Rate Set (breaths/minute): 12 breaths/min  Tidal Volume Set (mL): 500 mL  PEEP (cm H2O): 10 cmH2O  Oxygen Concentration (%): 40 %  Resp: 12  .    Heparin is running at 800 u/hr, ACT range 126-146.    Urine output is about 60-80 ml/hr, blood loss was a small amount from bloody chest tube output. Product given included 4 units of platelets and one unit of RBCs.      Intake/Output Summary (Last 24 hours) at 1/25/2020 0602  Last data filed at 1/25/2020 0600  Gross per 24 hour   Intake 5781.69 ml   Output 2809 ml   Net 2972.69 ml       ECHO:  No results found for this or any previous visit.No results found for this or any previous visit.    CXR:  Recent Results (from the past 24 hour(s))   XR Feeding Tube Placement    Addendum: 1/24/2020    THONY AMKI MD, attest that I was present for all critical  portions of the procedure and was immediately available to  provide  guidance and assistance during the remainder of the procedure.    THONY MARTIN MD      Narrative    Feeding tube placement.    Comparison:  none.    History: Feeding tube needed for nutrition.     Fluoroscopy time:  4.8 minutes    Technique: After injection of Xylocaine gel into the right nostril, a  feeding tube was advanced under fluoroscopic guidance.    Findings: Multiple overlying lines and tubes demonstrated. The feeding  tube is advanced with the tip in the second portion of the duodenum. A  small amount of barium was injected to define anatomy.      Impression    Impression: Uncomplicated feeding tube placement with tip in the  second portion of the duodenum.    I have personally reviewed the examination and initial interpretation  and I agree with the findings.    THONY MARTIN MD       Labs:  Recent Labs   Lab 01/25/20  0549 01/25/20  0342 01/25/20  0156 01/24/20  2358   PH 7.46* 7.45 7.44 7.44   PCO2 43 51* 44 45   PO2 160* 167* 126* 147*   HCO3 31* 35* 30* 31*   O2PER 40 40 40 40       Lab Results   Component Value Date    HGB 8.8 (L) 01/25/2020    PHGB 40 (H) 01/24/2020    PLT 84 (L) 01/25/2020    FIBR 274 01/25/2020    INR 1.46 (H) 01/25/2020    PTT 46 (H) 01/25/2020    DD 16.8 (H) 01/25/2020    AXA <0.10 01/25/2020    ANTCH 64 (L) 01/24/2020         Plan is for a chest washout on Sunday. Will continue central VA ECMO support at this time.      Nacho Macias, RT  1/25/2020 6:02 AM

## 2020-01-25 NOTE — PLAN OF CARE
Major Shift Events:  weaned gtts, ECMO turndown with improvement of cardiac function: Flow: 1.5 lpm, MAP: 60, HR: 71, O2: 95, SvO2: 47, estimated EF: 60% while on Epi: 0.03 and Norepi: 0.04. 1 unit PRBC, 750 Albumin per ECMO protocol. MONAE performed for turndown, increasingly bloody oral secretions noted post procedure.  Plan: Continue to monitor overnight and prepare for potential decannulation tomorrow.  For vital signs and complete assessments, please see documentation flowsheets.

## 2020-01-26 NOTE — PROGRESS NOTES
ECMO Attending Progress Note  1/26/2020    Enmanuel Hernandez is a 74 year old male who was started on central VA ECMO day # 4due to severe cardiac failure S/P aortic dissection repair.   Interval events: abnormal head CT today, awaiting neuro evaluation  The patients vital signs today are as follows:    Temp:  [98.1  F (36.7  C)-98.8  F (37.1  C)] 98.2  F (36.8  C)  Heart Rate:  [] 61  Resp:  [12] 12  MAP:  [47 mmHg-124 mmHg] 56 mmHg  Arterial Line BP: ()/() 69/53  FiO2 (%):  [40 %] 40 %  SpO2:  [95 %-100 %] 99 %    Intake/Output Summary (Last 24 hours) at 1/26/2020 1130  Last data filed at 1/26/2020 1100  Gross per 24 hour   Intake 6287.18 ml   Output 6170 ml   Net 117.18 ml    Ventilation Mode: CMV/AC  (Continuous Mandatory Ventilation/ Assist Control)  FiO2 (%): 40 %  Rate Set (breaths/minute): 12 breaths/min  Tidal Volume Set (mL): 500 mL  PEEP (cm H2O): 10 cmH2O  Oxygen Concentration (%): 40 %  Resp: 12     Recent Labs   Lab 01/26/20  1010 01/26/20  0355 01/26/20  0156 01/26/20  0014   PH 7.45 7.50* 7.48* 7.53*   PCO2 45 39 39 35   PO2 174* 165* 141* 132*   HCO3 31* 31* 29* 29*   O2PER 40 40.0 40.0 40.0      Recent Labs   Lab 01/26/20  1010 01/26/20  0355 01/25/20  2140 01/25/20  1540   WBC 14.7* 16.0* 13.9* 13.5*   HGB 8.8* 8.9* 8.5* 7.8*     Creatinine   Date Value Ref Range Status   01/26/2020 1.86 (H) 0.66 - 1.25 mg/dL Final   01/26/2020 1.94 (H) 0.66 - 1.25 mg/dL Final   01/25/2020 2.05 (H) 0.66 - 1.25 mg/dL Final   01/25/2020 2.16 (H) 0.66 - 1.25 mg/dL Final     Physical Exam: unchanged  Cannula unchanged.  Minimal oozing.  Minimal air movement  Extremities edematous    ECMO Issues including assessments and plan on DOS 1/26/2020:    Neuro: Sedated for mechanical ventilation and ECMO.  NIRS  RASS goal:   CV: Hemodynamically stable on ECMO   Pulm: Severe respiratory failure requiring ECMO and mechanical ventilation. Flows unchanged at 4.5    Keep vent settings at rest settings: PC 25, PEEP10,  FiO2 60%.  FEN/Renal: Creatinine  Lab Results   Component Value Date    CR 1.86 01/26/2020   ,UOP STABLE  Heme: Hemoglobin 8.8 .  Goals: if O2 sat >85% Hgb may drift to 7-8.  If O2 Sat <85% keep Hgb 10-12.  ID: no major issues     All pertinent labs, imaging studies, physical exam and medications have been reviewed by me.     This patient requires continued ICU monitoring and cares.  I have discussed patient care and treatment plan with the ICU team and the patient's family.     ECMO [id number]    Kareem Montes MD  Cardiac Surgery  Pager [phone]

## 2020-01-26 NOTE — PROGRESS NOTES
ECMO Shift Summary:      ECMO Equipment:  Console serial number: [id number]  Circuit Lot number: [id number]  Oxygenator Lot number: [id number]    Patient remains on VA ECMO, all equipment is functioning and alarms are appropriately set. RPM's 3500 with flow range 4.12-4.42 L/min. Sweep gas is at 2 LPM and FiO2 70%. There remains extensive clot/fibrin throughout the oxygenator. Cannulas are secure with no bleeding from site. There is oozing at the chest tube sites. Extremities are warm to touch and edematous. Suctioned ETT for a small amount of thick, ann-marie secretions.    Significant Shift Events: Platelet goal reduced to greater than 50. The patient had fluctuating blood pressure throughout the night. No product or fluid was given overnight. The patient is producing a large amount of urine.    Vent settings:  Ventilation Mode: CMV/AC  (Continuous Mandatory Ventilation/ Assist Control)  FiO2 (%): 40 %  Rate Set (breaths/minute): 12 breaths/min  Tidal Volume Set (mL): 500 mL  PEEP (cm H2O): 10 cmH2O  Oxygen Concentration (%): 40 %  Resp: 12  .    Heparin is running at 1400 u/hr, ACT range 162-175.    Urine output is greater than 150 ml/hr, blood loss was a small amount from the oozing at the site. No blood products or fluids were given overnight.      Intake/Output Summary (Last 24 hours) at 1/26/2020 0622  Last data filed at 1/26/2020 0600  Gross per 24 hour   Intake 5442.15 ml   Output 5175 ml   Net 267.15 ml       ECHO:  No results found for this or any previous visit.No results found for this or any previous visit.    CXR:  Recent Results (from the past 24 hour(s))   XR Chest Port 1 View    Narrative    Exam: XR CHEST PORT 1 VW, 1/25/2020 10:17 AM    Indication: open chest    Comparison: 1/24/2020    Findings:   AP views of the chest. Left and right chest tubes, 3 mediastinal  drains remain in place. Left and right chest tubes. At least 6 pieces  of gauze packing. Endotracheal tube with the tip in the mid  thoracic  trachea. Gastric tube with the distal end and sidehole projecting over  the esophagus. Feeding tube with distal end extending beyond the  field-of-view. Left IJ approach venous sheath with the tip terminating  in the brachiocephalic vein. Aortic valve prosthesis. Unchanged ECMO  cannula.    Cardiac silhouette is enlarged, but unchanged. Mixed interstitial  opacities similar to prior exam. Subcutaneous emphysema most prominent  in the right axilla. Right greater than left pleural effusions.  Unchanged bilateral rib deformities. No large pneumothorax.  Pneumomediastinum is consistent with open chest. Radiodense material  projecting over the left upper quadrant presumably contrast. Stable  elevation of the right hemidiaphragm.      Impression    Impression:   1. Mixed interstitial are opacities similar to prior exam. No new  focal consolidation.  2. Lines and support devices as detailed above.    I have personally reviewed the examination and initial interpretation  and I agree with the findings.    KATIE TOBIN MD   Echo Limited    Narrative    [id number]  [id number]  [id number]  [id number]^LEONIE^JESSI           [facility],[place]  Echocardiography Laboratory  [address] [place] [id number]     Name: SYDNEY CANO  MRN: [id number]  : 1945  Study Date: 2020 11:35 AM  Age: 74 yrs  Gender: Male  Patient Location: [facility]  Reason For Study: Aortic Dissection  Ordering Physician: JESSI HADLEY  Performed By: [facility] Tamra Joseph     BSA: 2.1 m2  Height: 70 in  Weight: 210 lb  BP: 87/62 mmHg  _____________________________________________________________________________  __        Procedure  Limited Portable Echo Adult. Technically difficult study. Poor acoustic  windows.  _____________________________________________________________________________  __        Interpretation Summary  Non-diagnostic study. Open chest, unable to visualize the  heart.  _____________________________________________________________________________  __     _____________________________________________________________________________  __                          Report approved by: Rena Nance 01/25/2020 03:33 PM              _____________________________________________________________________________  __          Labs:  Recent Labs   Lab 01/26/20  0355 01/26/20  0156 01/26/20  0014 01/25/20  2140   PH 7.50* 7.48* 7.53* 7.41   PCO2 39 39 35 51*   PO2 165* 141* 132* 110*   HCO3 31* 29* 29* 32*   O2PER 40.0 40.0 40.0 40       Lab Results   Component Value Date    HGB 8.9 (L) 01/26/2020    PHGB 30 (H) 01/26/2020    PLT 89 (L) 01/26/2020    FIBR 302 01/26/2020    INR 1.46 (H) 01/26/2020    PTT 75 (H) 01/26/2020    DD 5.9 (H) 01/26/2020    AXA 0.21 01/26/2020    ANTCH 55 (L) 01/25/2020         Plan is to go down to the OR at 8:40 AM for a chest washout with potential decannulation. Will continue VA ECMO until then.      Nacho Macias, RT  1/26/2020 6:22 AM

## 2020-01-26 NOTE — PROGRESS NOTES
ECMO Shift Summary:  1/25/2020 [id number]      ECMO Equipment:  Console serial number:  [id number]  Circuit Lot number:  (see perfusion Prime Order Sheet)   [id number]  Oxygenator Lot number:  [id number]        Patient remains on VA ECMO, all equipment is functioning and alarms are appropriately set. RPM's 3500 with flow range 4.23-4.37 L/min. Sweep gas is at 3 LPM and FiO2 70%. Circuit remains free of air, clot and fibrin. Cannulas are secure with no bleeding from site. Extremities are warm.    Significant Shift Events: Patient had turndown that was well tolerated.  Patient had MAP 60,  SpO2 95%, SvO2 48%, HR71 while on ECMO flow of 1.5LPM.  Patient's EF was approx 60% at this time via MONAE.    Vent settings:  Ventilation Mode: CMV/AC  (Continuous Mandatory Ventilation/ Assist Control)  FiO2 (%): 40 %  Rate Set (breaths/minute): 12 breaths/min  Tidal Volume Set (mL): 500 mL  PEEP (cm H2O): 10 cmH2O  Oxygen Concentration (%): 40 %  Resp: 12  .    Heparin is running at 1600 unit(s)/hr, ACT range 160-180.    Urine output is per nursing documentation, blood loss was minimal, but has increased oral bloody secretions following MONAE. Product given included PRBCx1.  Also received 250 mL Albumin x4.      Intake/Output Summary (Last 24 hours) at 1/25/2020 1828  Last data filed at 1/25/2020 1800  Gross per 24 hour   Intake 6180.79 ml   Output 4375 ml   Net 1805.79 ml       ECHO:  No results found for this or any previous visit.No results found for this or any previous visit.    CXR:  Recent Results (from the past 24 hour(s))   XR Chest Port 1 View    Narrative    Exam: XR CHEST PORT 1 VW, 1/25/2020 10:17 AM    Indication: open chest    Comparison: 1/24/2020    Findings:   AP views of the chest. Left and right chest tubes, 3 mediastinal  drains remain in place. Left and right chest tubes. At least 6 pieces  of gauze packing. Endotracheal tube with the tip in the mid thoracic  trachea. Gastric tube with the distal end and sidehole  projecting over  the esophagus. Feeding tube with distal end extending beyond the  field-of-view. Left IJ approach venous sheath with the tip terminating  in the brachiocephalic vein. Aortic valve prosthesis. Unchanged ECMO  cannula.    Cardiac silhouette is enlarged, but unchanged. Mixed interstitial  opacities similar to prior exam. Subcutaneous emphysema most prominent  in the right axilla. Right greater than left pleural effusions.  Unchanged bilateral rib deformities. No large pneumothorax.  Pneumomediastinum is consistent with open chest. Radiodense material  projecting over the left upper quadrant presumably contrast. Stable  elevation of the right hemidiaphragm.      Impression    Impression:   1. Mixed interstitial are opacities similar to prior exam. No new  focal consolidation.  2. Lines and support devices as detailed above.    I have personally reviewed the examination and initial interpretation  and I agree with the findings.    KATIE TOBIN MD   Echo Limited    Narrative    [id number]  [id number]  [id number]  [id number]^LEONIE^JESSI           [facility],[place]  Echocardiography Laboratory  [address] [place] [id number]     Name: SYDNEY CANO  MRN: [id number]  : 1945  Study Date: 2020 11:35 AM  Age: 74 yrs  Gender: Male  Patient Location: [facility]  Reason For Study: Aortic Dissection  Ordering Physician: JESSI HADLEY  Performed By: JAYNA Joseph     BSA: 2.1 m2  Height: 70 in  Weight: 210 lb  BP: 87/62 mmHg  _____________________________________________________________________________  __        Procedure  Limited Portable Echo Adult. Technically difficult study. Poor acoustic  windows.  _____________________________________________________________________________  __        Interpretation Summary  Non-diagnostic study. Open chest, unable to visualize the  heart.  _____________________________________________________________________________  __     _____________________________________________________________________________  __                          Report approved by: Rena Nance 01/25/2020 03:33 PM              _____________________________________________________________________________  __          Labs:  Recent Labs   Lab 01/25/20  1756 01/25/20  1541 01/25/20  1540 01/25/20  1502 01/25/20  1409   PH 7.43  --  7.46* 7.51* 7.47*   PCO2 49*  --  46* 42 46*   PO2 118*  --  79* 69* 85   HCO3 32*  --  33* 33* 33*   O2PER 40 40 40  70 40 40       Lab Results   Component Value Date    HGB 7.8 (L) 01/25/2020    PHGB 30 (H) 01/25/2020    PLT 78 (L) 01/25/2020    FIBR 293 01/25/2020    INR 1.49 (H) 01/25/2020    PTT 66 (H) 01/25/2020    DD 9.1 (H) 01/25/2020    AXA 0.20 01/25/2020    ANTCH 55 (L) 01/25/2020         Plan is to continue VA ECMO support.  Patient is tentatively scheduled to go to OR tomorrow for ECMO decannulation.      Ze Nichole, RT  1/25/2020 6:28 PM

## 2020-01-26 NOTE — PROGRESS NOTES
CV ICU PROGRESS NOTE  1/26/2020  Enmanuel Hernandez  [id number]  Admitted: 1/17/2020 12:21 PM      CO-MORBIDITIES:   Dissection of aorta, unspecified portion of aorta (H)    ASSESSMENT: Enmanuel Hernandez is a 74 year old male who was admitted for aortic dissection. Patient transferred from [place] 1/16/20 and then transferred to [facility] in [place]. Air lifted to [facility] 1/17/20 where patient was found to be a prohibitively high risk of operation given his history of AVR. Will medically manage and optimize to reassess risk of surgery.  Discussed code status with wife, patient does not want Chest compressions but ok with having an ETT.  Bronchial cultures positive for haemophilus and strep pneumonia, started on Zosyn-Vanc (1/17) and patient narrowed to ceftriaxone (1/20). Extubated 1/20 but re-intubated 1/21 due to acute hypoxic respiratory failure. Pt underwent Type A aortic valve repair with hypothermic circulatory arrest on 1/22 and was started on VA ECMO due to inability to come off CP bypass intraoperatively. MTP also started due to coagulopathy intra-op. Chest washout (1/23). MTP stopped post-op. Pupils noted to be fixed and dilated today.  CT demonstrated herniation with extensive multifocal infarcts.  Neuro critical care and neuro surgery consulted.  Poor prognosis, no plan for surgical intervention.  OR cancelled for today.  Extensive discussion with family today, palliative consulted, for continued goals of care.  Will likely transition to comfort cares    TODAY'S PROGRESS:   - Palliative consulted  - OR on hold  - Family care meeting  - Consulted neurosurg and neurocrit    PLAN:  Neurological:  #Perioperative Pain and anxiety  #Multifocal infarctions likely 2/2 embolic stroke vs. ischemic   - Precedex gtt   - Fentanyl gtt   - Neurosurgery consulted, appreciate recommendations   - Neurocritical care consulted, appreciate recommendations     Pulmonary:   #Acute hypoxic respiratory failure  #Strep pneumonia with  superimposed haemophilus influenza   - Stop ceftriaxone (1/23)   - Hold Mucomyst nebs     Cardiovascular:    #History of AVR  #History of Aortic dissection s/p repair with new type A ascending aortic dissection  #S/P Type A aortic dissection repair with open chest   - Start Cefepime for open chest  #Heart failure   - VA ECMO   - Wean Flolan  #A flutter   - biventricular pacing    - Maintain MAP >65   - Diuresis to CVP goal 8-12   - Monitor via flotrak   - Stop Metoprolol 25mg BID     Gastroenterology/Nutrition:  #Shock Liver   - Maintain MAP >65  - NPO for now  - Trend lactic acid  - Neutra-phos  - PPI  - Place NJ today     Electrolytes/Renal:  #Hypervolemia   - Acetazolamide, 1g once  #Hypernatremia   - Strict I&Os, ayala to remain   - Trend creatinine   - Diuresis to CVP goal 8-12   - FW to 40 ml/hr     Endocrine:  # Hyperglycemia   - SSI    - Goal to keep BG< 180 mg/dL    ID:  #Strep pneumonia with superimposed haemophilus influenza   - Stop Ceftriaxone (1/23)  #Open Chest   - Vancomycin for post-op ppx   -Cefepime, 2g Q8H    Heme:  #Coagulopathy   - Heparin gtt    - Transfuse for Hb >8, Platelets >50    General Cares/Prophylaxis:    DVT Prophylaxis: Heparin gtt  GI Prophylaxis: PPI    Lines/ tubes/ drains:  - ETT, PIVx3, A.line x2, LIJ PAC, NG, chest tube x 6, ayala, rectal tube    Disposition: CV ICU    Patient seen, findings and plan discussed with CV ICU staff Intensivist Dr. Danilo Goldberg Jr., MD  Surgical ICU Fellow  Pager: [phone]  1/26/2020  3:36 PM    ====================================    TODAY'S PROGRESS:   SUBJECTIVE:    Pupil fixed and dilated this AM.    OBJECTIVE:   1. VITAL SIGNS:   Temp:  [98.1  F (36.7  C)-98.8  F (37.1  C)] 98.2  F (36.8  C)  Heart Rate:  [] 64  Resp:  [12] 12  MAP:  [47 mmHg-124 mmHg] 73 mmHg  Arterial Line BP: ()/() 90/64  FiO2 (%):  [40 %] 40 %  SpO2:  [95 %-100 %] 99 %  Ventilation Mode: CMV/AC  (Continuous Mandatory Ventilation/ Assist  Control)  FiO2 (%): 40 %  Rate Set (breaths/minute): 12 breaths/min  Tidal Volume Set (mL): 500 mL  PEEP (cm H2O): 10 cmH2O  Oxygen Concentration (%): 40 %  Resp: 12      2. INTAKE/ OUTPUT:   I/O last 3 completed shifts:  In: 5237.15 [I.V.:2653.15; NG/GT:1150]  Out: 5415 [Urine:3830; Emesis/NG output:125; Chest Tube:1460]    3. PHYSICAL EXAMINATION:   General: Intubated, sedated, supine  HEENT: moist mucous membranes, fixed dilated pupils equally   Neuro: Sedated  Pulm/Resp: Mechanical breath sounds bilaterally  CV: RRR  Abdomen: Soft, distended, unable to assess pain d/t sedation  : ayala catheter in place  MSK/Extremities: peripheral pulses intact, extremities well perfused    4. INVESTIGATIONS:   Arterial Blood Gases   Recent Labs   Lab 01/26/20  1010 01/26/20  0355 01/26/20  0156 01/26/20  0014   PH 7.45 7.50* 7.48* 7.53*   PCO2 45 39 39 35   PO2 174* 165* 141* 132*   HCO3 31* 31* 29* 29*     Complete Blood Count   Recent Labs   Lab 01/26/20  1010 01/26/20  0355 01/25/20 2140 01/25/20  1540   WBC 14.7* 16.0* 13.9* 13.5*   HGB 8.8* 8.9* 8.5* 7.8*   PLT 67* 89* 69* 78*     Basic Metabolic Panel  Recent Labs   Lab 01/26/20  1010 01/26/20  0355 01/25/20 2140 01/25/20  1540   * 152* 153* 151*   POTASSIUM 3.7 3.6 3.6 3.6   CHLORIDE 119* 117* 115* 112*   CO2 34* 33* 34* 34*   BUN 69* 70* 75* 73*   CR 1.86* 1.94* 2.05* 2.16*   * 121* 124* 156*     Liver Function Tests  Recent Labs   Lab 01/26/20  1010 01/26/20  0355 01/25/20  2140 01/25/20  1540  01/25/20  0342  01/24/20  0328  01/23/20  0404 01/23/20  0258 01/23/20  0145   AST  --  115*  --   --   --   --   --   --   --  364* 395* 325*   ALT  --  138*  --   --   --  177*  --  185*  --  202* 211* 193*   ALKPHOS  --  78  --   --   --   --   --   --   --  40 39* 35*   BILITOTAL  --  5.3*  --   --   --   --   --   --   --  2.8* 2.7* 2.3*   ALBUMIN  --  2.8*  --   --   --  2.5*  --  2.3*  --  2.2* 1.8* 1.6*   INR 1.43* 1.46* 1.49* 1.49*   < > 1.46*   < >  "1.43*   < > 0.93 1.91* 1.64*    < > = values in this interval not displayed.     Pancreatic Enzymes  No lab results found in last 7 days.  Coagulation Profile  Recent Labs   Lab 01/26/20  1010 01/26/20  0355 01/25/20  2140 01/25/20  1540   INR 1.43* 1.46* 1.49* 1.49*   PTT 77* 75* 85* 66*     Lactate  Invalid input(s): LACTATE    5. RADIOLOGY:     \"EXAM: XR CHEST PORT 1 VW  1/24/2020 2:20 AM      HISTORY:  ECMO     COMPARISON: Chest radiograph dated 1/23/2020     FINDINGS: Frontal views of the chest. Endotracheal tube tip projects  over the midthoracic trachea. Gastric tube courses below left  hemidiaphragm with tip off the field-of-view. Aortic valve prosthesis.  Postoperative changes of aortic dissection repair. Right IJ central  venous catheter tip projects over the low SVC. Left IJ sheath tip  projects over the left innominate vein. Stable ECMO cannulae,  mediastinal drains, and chest tubes. Multiple surgical sponges project  over the chest. Taylor-Mayra catheter is removed.      Slightly decreased prominence of the cardiomediastinal silhouette.  Right greater than left pleural effusion, decreased from prior.  Prominent interstitial markings. Patchy lower lobe opacities.  Subcutaneous emphysema of the right chest wall.                                                                      IMPRESSION: 1. Open chest status-post aortic dissection repair with  multiple surgical sponges projecting over the mediastinum. Stable  position of the ECMO cannulae.  2. Taylor-Mayra catheter is removed. Additional support devices as above.  3. Decreased pleural effusions with right greater than left  interstitial and patchy airspace opacities.     I have personally reviewed the examination and initial interpretation  and I agree with the findings.     MARIO GRISSOM MD\"    =========================================      "

## 2020-01-26 NOTE — SIGNIFICANT EVENT
Stroke Code Nurse-Responder Note    Arrival Time to Stroke Code: 1136    Stroke Code Team interventions:   - De-escalated at 1143 by Ally    ED/Bedside Nurse providing handoff: NA    Time left for CT: NA- CT completed prior to stroke code being called.     Time arrived to next location (ED/Unit/IR): Remained on 4E.    ED/Bedside Nurse given handoff (name/time): ALINE Macario RN

## 2020-01-26 NOTE — PROGRESS NOTES
In view of stroke findings on CT, will cancel ecmo decannulation for today,   Await neurology and neurosurgery input.

## 2020-01-26 NOTE — PLAN OF CARE
Pressors remain the same overnight. No major issues noted. Bloody oral secretions have subsided  Plan: Continue to monitor and prepare for potential decannulation This morning. Both scrubs done all lytes replaced per protocol.   For vital signs and complete assessments, please see documentation flowsheets. No other issues noted. Will continue to monitor and address as needed.

## 2020-01-26 NOTE — ANESTHESIA PREPROCEDURE EVALUATION
Anesthesia Pre-Procedure Evaluation    Patient: Enmanuel Hernandez   MRN:     [id number] Gender:   male   Age:    74 year old :      1945        Preoperative Diagnosis: Status post cardiac surgery [Z98.890]   Procedure(s):  IRRIGATION AND DEBRIDEMENT, CHEST POSSIBLE CLOSURE     No past medical history on file.   Past Surgical History:   Procedure Laterality Date     INCISION AND DRAINAGE CHEST WASHOUT, COMBINED N/A 2020    Procedure: EMERGENT INCISION AND DRAINAGE, WOUND, CHEST, WITH IRRIGATION;  Surgeon: Mitch Weeks MD;  Location: [facility] OR     REPAIR ANEURYSM ASCENDING AORTA N/A 2020    Procedure: REDO MEDIAN STERNOTOMY, ON CARDIOPULMONARY BYPASS, ASCENDING AORTIC ANEURYSM REPAIR;  Surgeon: Mitch Weeks MD;  Location: [facility] OR          Anesthesia Evaluation     . Pt has had prior anesthetic. Type: General           ROS/MED HX    ENT/Pulmonary: Comment: Hypoxemic respiratory failure - hemophilus influenza and strep pneumonia     20; ; Mask Ventilation: Not attempted (RSI); Ease of Intubation: Easy; Airway Size: 8;  Cuffed;  Oral;  Blade Type: C-Mac;  Blade Size: 4;  Place by: CPS;  Insertion Attempts: 1;  Secured at (cm)to lip: 23 cm;  Breath Sounds: Equal, clear and bilateral;  End Tidal CO2: Present;  Dentition: Intact, Unchanged;  Grade View of Cords: 1    (+)tobacco use, Current use less than 1 pack  packs/day  , . .    Neurologic:     (+)delerium     Cardiovascular: Comment: Ascending aortic dissection s/p repair presenting for I+D, possible chest closure.  VA ECMO.      (+) ----. : . . . :. dysrhythmias a-flutter, valvular problems/murmurs Hx AVR:. Previous cardiac testing Echodate:20results:Aortic dissection with flap visualized in the proximal arch with severely  dilated ascending aorta measuring 5.7 cm.  Left ventricular function, chamber size, wall motion, and wall thickness are  normal.The EF is 55-60%.  S/P mechanical aortic valve. The aortic valve appears  well-seated. Limited  Doppler interrogation of the aortic valve is normal (MG 5 mmHg.)  No pericardial effusion is present.  Previous study not available for comparison.date: results: date: results: date: results:          METS/Exercise Tolerance:     Hematologic:  - neg hematologic  ROS       Musculoskeletal:         GI/Hepatic:  - neg GI/hepatic ROS       Renal/Genitourinary:     (+) chronic renal disease, type: ARF,       Endo:  - neg endo ROS       Psychiatric:         Infectious Disease:         Malignancy:         Other:                     JZG F[facility] AN PHYSICAL EXAM    LABS:  CBC:   Lab Results   Component Value Date    WBC 16.0 (H) 01/26/2020    WBC 13.9 (H) 01/25/2020    HGB 8.9 (L) 01/26/2020    HGB 8.5 (L) 01/25/2020    HCT 27.1 (L) 01/26/2020    HCT 26.4 (L) 01/25/2020    PLT 89 (L) 01/26/2020    PLT 69 (L) 01/25/2020     BMP:   Lab Results   Component Value Date     (H) 01/26/2020     (H) 01/25/2020    POTASSIUM 3.6 01/26/2020    POTASSIUM 3.6 01/25/2020    CHLORIDE 117 (H) 01/26/2020    CHLORIDE 115 (H) 01/25/2020    CO2 33 (H) 01/26/2020    CO2 34 (H) 01/25/2020    BUN 70 (H) 01/26/2020    BUN 75 (H) 01/25/2020    CR 1.94 (H) 01/26/2020    CR 2.05 (H) 01/25/2020     (H) 01/26/2020     (H) 01/25/2020     COAGS:   Lab Results   Component Value Date    PTT 75 (H) 01/26/2020    INR 1.46 (H) 01/26/2020    FIBR 302 01/26/2020     POC:   Lab Results   Component Value Date     (H) 01/25/2020     OTHER:   Lab Results   Component Value Date    PH 7.50 (H) 01/26/2020    LACT 1.5 01/26/2020    A1C 5.8 (H) 01/23/2020    ELLA 8.0 (L) 01/26/2020    PHOS 3.2 01/26/2020    MAG 2.6 (H) 01/26/2020    ALBUMIN 2.8 (L) 01/26/2020    PROTTOTAL 5.1 (L) 01/26/2020     (H) 01/26/2020     (H) 01/26/2020    ALKPHOS 78 01/26/2020    BILITOTAL 5.3 (H) 01/26/2020    .0 (H) 01/20/2020        Preop Vitals    BP Readings from Last 3 Encounters:   01/23/20 (!) 86/67    Pulse Readings  "from Last 3 Encounters:   01/23/20 86      Resp Readings from Last 3 Encounters:   01/26/20 12    SpO2 Readings from Last 3 Encounters:   01/26/20 99%      Temp Readings from Last 1 Encounters:   01/26/20 36.8  C (98.2  F)    Ht Readings from Last 1 Encounters:   01/17/20 1.778 m (5' 10\")      Wt Readings from Last 1 Encounters:   01/26/20 101.3 kg (223 lb 5.2 oz)    Estimated body mass index is 32.04 kg/m  as calculated from the following:    Height as of this encounter: 1.778 m (5' 10\").    Weight as of this encounter: 101.3 kg (223 lb 5.2 oz).     LDA:  Peripheral IV 01/17/20 Left Lower forearm (Active)   Site Assessment [facility] 1/26/2020  4:00 AM   Line Status Saline locked 1/26/2020  4:00 AM   Phlebitis Scale 0-->no symptoms 1/26/2020  4:00 AM   Infiltration Scale 0 1/26/2020  4:00 AM   Infiltration Site Treatment Method  None 1/22/2020  8:00 AM   Extravasation? No 1/26/2020  4:00 AM   Number of days: 9       Peripheral IV 01/24/20 Left Upper forearm (Active)   Site Assessment [facility] 1/26/2020  4:00 AM   Line Status Infusing 1/26/2020  4:00 AM   Phlebitis Scale 0-->no symptoms 1/26/2020  4:00 AM   Infiltration Scale 0 1/26/2020  4:00 AM   Extravasation? No 1/26/2020  4:00 AM   Number of days: 2       Peripheral IV 01/25/20 Right Lower forearm (Active)   Site Assessment [facility] 1/26/2020  4:00 AM   Line Status Saline locked 1/26/2020  4:00 AM   Phlebitis Scale 0-->no symptoms 1/26/2020  4:00 AM   Infiltration Scale 0 1/26/2020  4:00 AM   Extravasation? No 1/26/2020  4:00 AM   Number of days: 1       Arterial Line 01/17/20 (Active)   Site Assessment [facility] 1/26/2020  4:00 AM   Line Status Pulsatile blood flow 1/26/2020  4:00 AM   Arterine Line Cap Change Due 01/28/20 1/26/2020  4:00 AM   Art Line Waveform Appropriate 1/26/2020  4:00 AM   Art Line Interventions Leveled;Flushed per protocol 1/26/2020  4:00 AM   Color/Movement/Sensation Capillary refill less than 3 sec 1/26/2020  4:00 AM   Line Necessity Yes, meets criteria " 1/26/2020  4:00 AM   Dressing Type Transparent 1/26/2020  4:00 AM   Dressing Status Clean, dry, intact 1/26/2020  4:00 AM   Dressing Intervention Dressing changed/new dressing 1/22/2020  8:00 AM   Dressing Change Due 01/28/20 1/26/2020  4:00 AM   Number of days: 9       CVC Double Lumen 01/23/20 Left Internal jugular (Active)   Site Assessment WDL 1/26/2020  4:00 AM   Dressing Intervention New dressing;Chlorhexidine sponge 1/26/2020  4:00 AM   Dressing Change Due 01/30/20 1/26/2020  4:00 AM   CVC Comment CDI 1/26/2020  4:00 AM   Lumen A - Color BROWN 1/26/2020  4:00 AM   Lumen A - Status infusing 1/26/2020  4:00 AM   Lumen A - Cap Change Due 01/30/20 1/26/2020  4:00 AM   Lumen B - Color WHITE 1/26/2020  4:00 AM   Lumen B - Status infusing 1/26/2020  4:00 AM   Lumen B - Cap Change Due 01/30/20 1/26/2020  4:00 AM   Extravasation? No 1/26/2020  4:00 AM   Number of days: 3       Arterial Sheath  (Active)   Specific Qualities Sutured 1/26/2020  4:00 AM   Site Assessment UTV 1/26/2020  4:00 AM   Dressing Type Transparent 1/26/2020  4:00 AM   Arterial Sheath Comment ECMO 1/26/2020  4:00 AM   Number of days: 4       Venous Sheath (Active)   Specific Qualities Sutured 1/26/2020  4:00 AM   Site Assessment UTV 1/26/2020  4:00 AM   Dressing Type Transparent 1/26/2020  4:00 AM   Venous Sheath Comment ECMO 1/26/2020  4:00 AM   Number of days: 4       ETT (adult) 8 (Active)   Secured By Commercial tube slaughter 1/26/2020  4:23 AM   Site Appearance Clean and dry 1/26/2020  4:23 AM   Secured at (cm) to lip 24 cm 1/26/2020  4:23 AM   Site of ET Tube Securement At lip 1/26/2020  4:23 AM   Cuff Pressure - Type minimal occluding volume 1/25/2020  8:00 PM   Tube Care/Reposition repositioned tube center of mouth 1/26/2020  4:23 AM   Bite Block Secure and Patent 1/26/2020  4:23 AM   Safety Measures manual resuscitator/PEEP valve in room;manual resuscitator/mask/valve in room 1/26/2020  4:23 AM   ETT Cuff Deflation Leak Test No Leak 1/25/2020   8:00 PM   Number of days: 5       Chest Tube 4 Anterior Mediastinal 36 [place] (Active)   Site Assessment [facility] 1/26/2020  4:00 AM   Suction -20 cm H2O 1/26/2020  4:00 AM   Chest Tube Airleak No 1/26/2020  4:00 AM   Drainage Description Serosanguinous 1/26/2020  4:00 AM   Dressing Status Dressing Changed 1/26/2020  4:00 AM   Dressing Change Due 01/27/20 1/26/2020  4:00 AM   Dressing Intervention New dressing 1/26/2020  4:00 AM   Patency Intervention Tip/Tilt 1/26/2020  4:00 AM   Chest Tube Clamps at Bedside present 1/26/2020  4:00 AM   Container Amount 1310 1/26/2020  6:00 AM   Output (ml) 10 ml 1/26/2020  6:00 AM   Number of days: 4       Chest Tube 5 Anterior Mediastinal 36 [place] (Active)   Site Assessment [facility] 1/26/2020  4:00 AM   Suction -20 cm H2O 1/26/2020  4:00 AM   Chest Tube Airleak No 1/26/2020  4:00 AM   Drainage Description Serosanguinous 1/26/2020  4:00 AM   Dressing Status Dressing Changed 1/26/2020  4:00 AM   Dressing Change Due 01/27/20 1/26/2020  4:00 AM   Dressing Intervention New dressing 1/26/2020  4:00 AM   Patency Intervention Tip/Tilt 1/26/2020  4:00 AM   Chest Tube Clamps at Bedside present 1/26/2020  4:00 AM   Number of days: 4       Chest Tube 1 Anterior Other (Comment) 24 Sudanese (Active)   Site Assessment [facility] 1/26/2020  4:00 AM   Suction -20 cm H2O 1/26/2020  4:00 AM   Chest Tube Airleak Yes 1/26/2020  4:00 AM   Drainage Description Serosanguinous 1/26/2020  4:00 AM   Dressing Status Dressing Changed 1/26/2020  4:00 AM   Dressing Change Due 01/27/20 1/26/2020  4:00 AM   Dressing Intervention New dressing 1/26/2020  4:00 AM   Patency Intervention Tip/Tilt 1/26/2020  4:00 AM   Chest Tube Clamps at Bedside present 1/26/2020  4:00 AM   Container Amount 1060 1/26/2020  6:00 AM   Output (ml) 0 ml 1/26/2020  6:00 AM   Number of days: 3       Chest Tube 2 Anterior Mediastinal 32 Sudanese (Active)   Site Assessment WDL 1/26/2020  4:00 AM   Suction -20 cm H2O 1/26/2020  4:00 AM   Chest Tube Airleak Yes  1/26/2020  4:00 AM   Drainage Description Serosanguinous 1/26/2020  4:00 AM   Dressing Status Dressing Changed 1/26/2020  4:00 AM   Dressing Change Due 01/27/20 1/26/2020  4:00 AM   Dressing Intervention New dressing 1/26/2020  4:00 AM   Patency Intervention Tip/Tilt 1/26/2020  4:00 AM   Chest Tube Clamps at Bedside present 1/26/2020  4:00 AM   Container Amount 1670 1/26/2020  6:00 AM   Output (ml) 20 ml 1/26/2020  6:00 AM   Number of days: 3       Chest Tube 3 Anterior Mediastinal  (Active)   Site Assessment Bleeding 1/26/2020  4:00 AM   Suction -20 cm H2O 1/26/2020  4:00 AM   Chest Tube Airleak Yes 1/26/2020  4:00 AM   Drainage Description Serosanguinous 1/26/2020  4:00 AM   Dressing Status Dressing Changed 1/26/2020  4:00 AM   Dressing Change Due 01/27/20 1/26/2020  4:00 AM   Dressing Intervention Gauze 1/26/2020  4:00 AM   Patency Intervention Tip/Tilt;Stripped 1/26/2020  4:00 AM   Chest Tube Clamps at Bedside present 1/26/2020  4:00 AM   Number of days: 3       Chest Tube 6 Anterior Mediastinal  (Active)   Site Assessment WDL 1/26/2020  4:00 AM   Suction -20 cm H2O 1/26/2020  4:00 AM   Chest Tube Airleak Yes 1/26/2020  4:00 AM   Drainage Description Serosanguinous 1/26/2020  4:00 AM   Dressing Status Normal: Clean, Dry & Intact 1/26/2020  4:00 AM   Dressing Change Due 01/27/20 1/26/2020  4:00 AM   Dressing Intervention New dressing 1/26/2020  4:00 AM   Patency Intervention Tip/Tilt 1/26/2020  4:00 AM   Chest Tube Clamps at Bedside present 1/26/2020  4:00 AM   Number of days: 3       NG/OG/NJ Tube Nasogastric Right nostril (Active)   Site Description WDL 1/26/2020  4:00 AM   Status Enteral Feedings 1/26/2020  4:00 AM   Drainage Appearance Bile 1/26/2020  4:00 AM   Placement Confirmation [place] unchanged 1/26/2020  4:00 AM   [place] (cm marking) at nare/mouth 66 cm 1/26/2020  4:00 AM   Intake (ml) 10 ml 1/24/2020  6:00 AM   Flush/Free Water (mL) 0 mL 1/24/2020  4:00 PM   Residual (mL) 50 mL 1/24/2020  4:00 AM    Container Amount 550 mL 1/26/2020  4:00 AM   Output (ml) 0 ml 1/26/2020  4:00 AM   Number of days: 3       NG/OG/NJ Tube Nasoduodenal 10 fr Left nostril (Active)   Site Description WDL 1/26/2020  4:00 AM   Status Enteral Feedings 1/26/2020  4:00 AM   Placement Confirmation [place] unchanged 1/26/2020  4:00 AM   [place] (cm marking) at nare/mouth 94 cm 1/26/2020  4:00 AM   Intake (ml) 45 ml 1/24/2020 10:00 PM   Flush/Free Water (mL) 50 mL 1/26/2020  6:00 AM   Number of days: 2       Urethral Catheter Latex;Temperature probe 16 fr (Active)   Tube Description Positional 1/26/2020  4:00 AM   Catheter Care Done 1/26/2020  4:00 AM   Collection Container Standard;Patent 1/26/2020  4:00 AM   Securement Method Securing device (Describe) 1/26/2020  4:00 AM   Rationale for Continued Use Strict 1-2 Hour I&O 1/26/2020  4:00 AM   Urine Output 250 mL 1/26/2020  6:00 AM   Number of days: 4        Assessment:   ASA SCORE: 4    H&P: History and physical reviewed and following examination; no interval change.   Smoking Status:  Non-Smoker/Unknown   NPO Status: NPO Appropriate     Plan:   Anes. Type:  General   Pre-Medication: None   Induction:  IV (Standard)   Airway: ETT; Oral   Access/Monitoring: PIV; A-Line; Central Access/Port present   Maintenance: Balanced     Postop Plan:   Postop Pain: Opioids  Postop Sedation/Airway: SECURED AIRWAY likely  Disposition: ICU     PONV Management:   Adult Risk Factors:, Non-Smoker, Postop Opioids   Prevention: Ondansetron                   Narendra Rose MD

## 2020-01-26 NOTE — PROGRESS NOTES
ECLS Discontinuation Note:    ECLS was discontinued 1/26/2020 at 16:36    Lauri Estrada, ECMO Specialist, RRT  1/26/2020 4:41 PM

## 2020-01-26 NOTE — DEATH PRONOUNCEMENT
Death Pronouncement    Following family care conference earlier today, it was decided to withdraw further supportive care.     At approximately 1645 it was noted that the patient lacked electrical cardiac activity.   Patient was examined. Appeared ashen, no obvious active breathing.   Pupils non-reactive; corneal reflex absent  No cardiac activity to auscultation after 2 minutes.   No palpable central or peripheral pulses.     Death pronounced on 1/26/2020 at 1650. Family notified.    Rashel Goldberg Jr., MD  Surgical ICU Fellow  Pager: [phone]  1/26/2020  5:12 PM

## 2020-01-26 NOTE — PROGRESS NOTES
CV ICU PROGRESS NOTE  1/25/2020  Enmanuel Hernandez  [id number]  Admitted: 1/17/2020 12:21 PM      CO-MORBIDITIES:   Dissection of aorta, unspecified portion of aorta (H)    ASSESSMENT: Enmanuel Hernandez is a 74 year old male who was admitted for aortic dissection. Patient transferred from [place] 1/16/20 and then transferred to [facility] in [place]. Air lifted to [facility] 1/17/20 where patient was found to be a prohibitively high risk of operation given his history of AVR. Will medically manage and optimize to reassess risk of surgery.  Discussed code status with wife, patient does not want Chest compressions but ok with having an ETT.  Bronchial cultures positive for haemophilus and strep pneumonia, started on Zosyn-Vanc (1/17) and patient narrowed to ceftriaxone (1/20). Extubated 1/20 but re-intubated 1/21 due to acute hypoxic respiratory failure. Pt underwent Type A aortic valve repair with hypothermic circulatory arrest on 1/22 and was started on VA ECMO due to inability to come off CP bypass intraoperatively. MTP also started due to coagulopathy intra-op. Chest washout (1/23). MTP stopped post-op. Back to OR tentatively on 1/26.    TODAY'S PROGRESS:   - follow up evening labs q4  - OR tomorrow for washout  - MONAE turndown, did well, likely decannulation tomorrow  - Changed ACT goals 160-180  - Heparin gtt  - AM LFTs    PLAN:  Neurological:  #Perioperative Pain and anxiety   - Propofol gtt   - Precedex gtt   - Fentanyl gtt     Pulmonary:   #Acute hypoxic respiratory failure  #Strep pneumonia with superimposed haemophilus influenza   - Stop ceftriaxone (1/23)   - Hold Mucomyst nebs     Cardiovascular:    #History of AVR  #History of Aortic dissection s/p repair with new type A ascending aortic dissection  #S/P Type A aortic dissection repair with open chest   - Start Cefepime for open chest  #Heart failure   - VA ECMO   - Wean Flolan  #A flutter   - 2 ventricular paced    - Maintain MAP >65   - Diuresis to CVP goal  8-12   - Monitor via flotrak   - Stop Metoprolol 25mg BID     Gastroenterology/Nutrition:  #Shock Liver   - Maintain MAP >65  - NPO for now  - Trend lactic acid  - Neutra-phos  - PPI  - Place NJ today     Electrolytes/Renal:  #Hypervolemia   - Acetazolamide, 1g once  #Hypernatremia   - Strict I&Os, ayala to remain   - Trend creatinine   - Diuresis to CVP goal 8-12   - FW to 40 ml/hr     Endocrine:  # Hyperglycemia   - SSI    - Goal to keep BG< 180 mg/dL    ID:  #Strep pneumonia with superimposed haemophilus influenza   - Stop Ceftriaxone (1/23)  #Open Chest   - Vancomycin for post-op ppx   - Start Cefepime, 2g Q8H    Heme:  #Coagulopathy   - Now off MTP   - Hold Heparin gtt    - TEG assay   - Transfuse for Hb >8, Platelets >50    General Cares/Prophylaxis:    DVT Prophylaxis: Heparin gtt (held)  GI Prophylaxis: PPI    Lines/ tubes/ drains:  - ETT, PIVx3, A.line x2, RIJ CVC, LIJ PAC, NG, chest tube x 6, ayala, rectal tube   - Remove L A. Line, remove R IJ    Disposition: CV ICU    Patient seen, findings and plan discussed with CV ICU staff Intensivist Dr. Danilo Muñiz MD  Anesthesiology Resident CA2, PGY3  CVICU    ====================================    TODAY'S PROGRESS:   SUBJECTIVE:    s/p type A aortic dissection repair with deep hypothermic circulatory arrest. No acute events overnight.     OBJECTIVE:   1. VITAL SIGNS:   Temp:  [98.1  F (36.7  C)-98.8  F (37.1  C)] 98.8  F (37.1  C)  Heart Rate:  [56-82] 79  Resp:  [12] 12  MAP:  [55 mmHg-98 mmHg] 66 mmHg  Arterial Line BP: ()/(49-87) 85/60  FiO2 (%):  [40 %] 40 %  SpO2:  [94 %-100 %] 97 %  Ventilation Mode: CMV/AC  (Continuous Mandatory Ventilation/ Assist Control)  FiO2 (%): 40 %  Rate Set (breaths/minute): 12 breaths/min  Tidal Volume Set (mL): 500 mL  PEEP (cm H2O): 10 cmH2O  Oxygen Concentration (%): 40 %  Resp: 12      2. INTAKE/ OUTPUT:   I/O last 3 completed shifts:  In: 6148.86 [I.V.:2893.86; NG/GT:1110]  Out: 3685 [Urine:2580;  Emesis/NG output:25; Chest Tube:1080]    3. PHYSICAL EXAMINATION:   General: Intubated, sedated, supine  HEENT: moist mucous membranes   Neuro: Sedated  Pulm/Resp: Mechanical breath sounds bilaterally  CV: RRR  Abdomen: Soft, distended, unable to assess pain d/t sedation  : ayala catheter in place  MSK/Extremities: peripheral pulses intact, extremities well perfused    4. INVESTIGATIONS:   Arterial Blood Gases   Recent Labs   Lab 01/25/20  1953 01/25/20  1756 01/25/20  1540 01/25/20  1502   PH 7.48* 7.43 7.46* 7.51*   PCO2 35 49* 46* 42   PO2 126* 118* 79* 69*   HCO3 26 32* 33* 33*     Complete Blood Count   Recent Labs   Lab 01/25/20  1540 01/25/20  0948 01/25/20  0342 01/24/20  2151   WBC 13.5* 14.1* 13.7* 13.7*   HGB 7.8* 8.3* 8.8* 8.2*   PLT 78* 92* 84* 69*     Basic Metabolic Panel  Recent Labs   Lab 01/25/20  1540 01/25/20  0948 01/25/20  0342 01/24/20  2151   * 151* 150* 152*   POTASSIUM 3.6 3.6 3.8 3.9   CHLORIDE 112* 112* 110* 110*   CO2 34* 35* 38* 36*   BUN 73* 76* 73* 68*   CR 2.16* 2.25* 2.28* 2.21*   * 139* 144* 151*     Liver Function Tests  Recent Labs   Lab 01/25/20  1540 01/25/20  0948 01/25/20  0342 01/24/20  2151  01/24/20  0328  01/23/20  0404 01/23/20  0258 01/23/20  0145  01/19/20  0329   AST  --   --   --   --   --   --   --  364* 395* 325*  --  87*   ALT  --   --  177*  --   --  185*  --  202* 211* 193*  --  144*   ALKPHOS  --   --   --   --   --   --   --  40 39* 35*  --  49   BILITOTAL  --   --   --   --   --   --   --  2.8* 2.7* 2.3*  --  1.6*   ALBUMIN  --   --  2.5*  --   --  2.3*  --  2.2* 1.8* 1.6*  --  2.8*   INR 1.49* 1.45* 1.46* 1.56*   < > 1.43*   < > 0.93 1.91* 1.64*   < > 1.38*    < > = values in this interval not displayed.     Pancreatic Enzymes  No lab results found in last 7 days.  Coagulation Profile  Recent Labs   Lab 01/25/20  1540 01/25/20  0948 01/25/20  0342 01/24/20  2151   INR 1.49* 1.45* 1.46* 1.56*   PTT 66* 49* 46* 36     Lactate  Invalid input(s):  "LACTATE    5. RADIOLOGY:     \"EXAM: XR CHEST PORT 1 VW  1/24/2020 2:20 AM      HISTORY:  ECMO     COMPARISON: Chest radiograph dated 1/23/2020     FINDINGS: Frontal views of the chest. Endotracheal tube tip projects  over the midthoracic trachea. Gastric tube courses below left  hemidiaphragm with tip off the field-of-view. Aortic valve prosthesis.  Postoperative changes of aortic dissection repair. Right IJ central  venous catheter tip projects over the low SVC. Left IJ sheath tip  projects over the left innominate vein. Stable ECMO cannulae,  mediastinal drains, and chest tubes. Multiple surgical sponges project  over the chest. [place] Star-Mayra catheter is removed.      Slightly decreased prominence of the cardiomediastinal silhouette.  Right greater than left pleural effusion, decreased from prior.  Prominent interstitial markings. Patchy lower lobe opacities.  Subcutaneous emphysema of the right chest wall.                                                                      IMPRESSION: 1. Open chest status-post aortic dissection repair with  multiple surgical sponges projecting over the mediastinum. Stable  position of the ECMO cannulae.  2. North Star-Mayra catheter is removed. Additional support devices as above.  3. Decreased pleural effusions with right greater than left  interstitial and patchy airspace opacities.     I have personally reviewed the examination and initial interpretation  and I agree with the findings.     MARIO GRISSOM MD\"    =========================================      "

## 2020-01-26 NOTE — CONSULTS
[facility]  NEUROSURGERY CONSULTATION NOTE    This consultation was requested by Dr. Goldberg from the CVTS service.    Reason for Consultation: multifocal large strokes     HPI:  Mr. Hernandez is a 74 year old male with hx AVR and previous aortic dissection s/p repair, admitted for aortic dissection s/p Type A aortic dissection repair with open chest on 1/22 and VA ECMO initiation due to inability to come off of CP bypass intraoperatively, who was improving for a hemodynamic standpoint and was ultimately planning to soon be decannulated and have sternal wound washout and closure, but this morning noted by RN to have fixed and dilated pupils. Imaging obtained demonstrates herniation with extensive multifocal infarcts and ventriculomegaly. Exam remains unchanged off of sedation.     PAST MEDICAL HISTORY: No past medical history on file.    PAST SURGICAL HISTORY:   Past Surgical History:   Procedure Laterality Date     INCISION AND DRAINAGE CHEST WASHOUT, COMBINED N/A 1/23/2020    Procedure: EMERGENT INCISION AND DRAINAGE, WOUND, CHEST, WITH IRRIGATION;  Surgeon: Mitch Weeks MD;  Location: [facility] OR     REPAIR ANEURYSM ASCENDING AORTA N/A 1/22/2020    Procedure: REDO MEDIAN STERNOTOMY, ON CARDIOPULMONARY BYPASS, ASCENDING AORTIC ANEURYSM REPAIR;  Surgeon: Mitch Weeks MD;  Location: [facility] OR       FAMILY HISTORY: No family history on file.    SOCIAL HISTORY:   Social History     Tobacco Use     Smoking status: Not on file   Substance Use Topics     Alcohol use: Not on file       MEDICATIONS:  No current outpatient medications on file.       Allergies:  Allergies   Allergen Reactions     Cephalexin Rash     MAR from [facility] noted rash as 'severe'     Fluorouracil Unknown     Warfarin Unknown     TEVA BRAND ONLY     Clindamycin Rash     Penicillins Rash         ROS: 10 point ROS of systems including Constitutional, Eyes, Respiratory, Cardiovascular,  "Gastroenterology, Genitourinary, Integumentary, Muscularskeletal, Psychiatric were all negative except for pertinent positives noted in my HPI.    EXAM:  BP (!) 86/67   Pulse 86   Temp 98.4  F (36.9  C) (Axillary)   Resp 12   Ht 1.778 m (5' 10\")   Wt 101.3 kg (223 lb 5.2 oz)   SpO2 99%   BMI 32.04 kg/m    CV: RRR, no m/r/g  Resp: mechanical breath sounds bilaterally  Abd: soft, NT, ND. BS+ throughout.  Neuro:   Intubated, off of sedation.   Pupils 5 mm, fixed and dilated. Non-reactive to light. No corneal reflex.   No gag reflex. No cough reflex.   No oculocephalics.   No response to noxious stimuli in BUE or BLE.     LABS/IMAGING:  Most Recent 3 CBC's:  Recent Labs   Lab Test 01/26/20  1010 01/26/20  0355 01/25/20  2140   WBC 14.7* 16.0* 13.9*   HGB 8.8* 8.9* 8.5*   MCV 90 89 91   PLT 67* 89* 69*     Most Recent 3 BMP's:  Recent Labs   Lab Test 01/26/20  1010 01/26/20  0355 01/25/20  2140   * 152* 153*   POTASSIUM 3.7 3.6 3.6   CHLORIDE 119* 117* 115*   CO2 34* 33* 34*   BUN 69* 70* 75*   CR 1.86* 1.94* 2.05*   ANIONGAP <1* 2* 4   ELLA 7.9* 8.0* 7.6*   * 121* 124*     CT Head w/o Contrast   Final Result   Abnormal   Impression:   1. Multifocal areas of hypodensities concerning for multiple   infarctions. Concerning for embolic disease. Infectious or   inflammatory processes are thought less likely. Recommend MRI when   able.   2. Diffuse cerebral edema and mild cerebellar tonsillar herniation.   3. Ventriculomegaly.   4. The right nasoenteric tube appears to loop within the oropharynx.      [Critical Result: Multiple infarcts]      Finding was identified on 1/26/2020 9:53 AM.       Dr. Goldberg was contacted by Dr. Fierro at 1/26/2020 10:09 AM and   verbalized understanding of the critical finding.       I have personally reviewed the examination and initial interpretation   and I agree with the findings.      JOSSE CHING MD        ASSESSMENT: 74 year old male s/p Type A aortic dissection " repair with open chest on 1/22 and ECMO placement, who now has new neurological finding of fixed and dilated pupils with imaging demonstrating multifocal infarction and herniation. Discussed extensively with family poor neurological outcome, who understand the severity of his prognosis and ultimately are likely planning to transition to comfort cares today.     RECOMMENDATIONS:  - No neurosurgical intervention given overall poor neurological outcome regardless of intervention   - Recommend palliative care consultation to assist in discussion of goals of care  - Please contact with any further questions or concerns    The patient was discussed with Dr. Méndez, neurosurgery attending, and he agrees with the above.    Sarai Garcia MD  Neurosurgery, PGY-5

## 2020-01-26 NOTE — DISCHARGE SUMMARY
[facility], [place]    Discharge Summary  CV Intensive Care Unit    Date of Admission:  2020  Date of Discharge:  2020  Discharging Provider: Rashel Goldberg  Date of Service (when I saw the patient): 20    Discharge Diagnoses   Principal Problem:    Heart failure (H)  Active Problems:    Aortic dissection (H)    Ascending aortic aneurysm (H)    Status post aortic dissection repair    Acute kidney failure, unspecified (H)    Patient  of Cardiac failure 2/2 cardiogenic shock c/b embolic stroke with herniation following withdrawal of care.    Procedure/Surgery Information   Procedure: Procedure(s):  EMERGENT INCISION AND DRAINAGE, WOUND, CHEST, WITH IRRIGATION   Surgeon(s): Surgeon(s) and Role:     * Mitch Weeks MD - Primary     * Sharif Cisneros PA-C - Assisting     * Osmani Perry MD - Resident - Assisting   Specimens: * No specimens in log *   Non-operative procedures Arterial line, central line, swan     History of Present Illness  and hospital course  Enmanuel Hernandez is a 74 year old male who was admitted for aortic dissection. Patient transferred from [facility] 20 and then transferred to [facility] in [place]. Air lifted to [facility] 20 where patient was found to be a prohibitively high risk of operation given his history of AVR.  Bronchial cultures positive for haemophilus and strep pneumonia, started on Zosyn-Vanc () and patient narrowed to ceftriaxone (). Extubated  but re-intubated  due to acute hypoxic respiratory failure. Pt underwent Type A aortic valve repair with hypothermic circulatory arrest on  and was started on VA ECMO due to inability to come off CP bypass intraoperatively. MTP also started due to coagulopathy intra-op. Chest washout (). MTP stopped post-op. Pupils noted to be fixed and dilated today.  CT demonstrated herniation with extensive multifocal infarcts.  Neuro critical care and neuro surgery consulted.   Poor prognosis, no plan for surgical intervention.  OR cancelled for today.  Extensive discussion with family today, palliative consulted, for continued goals of care.  Patient transition to comfort cares and passed away at 1650.      Post-operative pain control: NA  Medications discontinued or adjusted during this hospitalization: No change     Antibiotics prescribed at discharge: NA    Imaging study follow up needs:   NA    Significant Findings:   CT head :  1. Multifocal areas of hypodensities concerning for multiple  infarctions. Concerning for embolic disease. Infectious or  inflammatory processes are thought less likely. Recommend MRI when  able.  2. Diffuse cerebral edema and mild cerebellar tonsillar herniation.  3. Ventriculomegaly.  4. The right nasoenteric tube appears to loop within the oropharynx.    TTE :  Interpretation Summary  VA ECMO, open chest, RA/aortic cannulation. Turndown study.  Full support:  Global and regional left ventricular function is normal with an EF of 55-60%.  RV not well seen due to shadowing, and appears decompressed.  A mechanical aortic valve is present. Doppler interrogation of the aortic  valve is normal.  No pericardial effusion is present.  ECMO inflow cannula seen in the RA.     With gradual turndown to 3.0 lpm and then 1.5 lpm, LV fxn improves to LVEF 60-  65%.    Rashel Goldberg Jr., MD  Surgical ICU Fellow  Pager: [phone]  2020  5:20 PM      Discharge Disposition   Patient  during this admission   Condition at discharge:     Primary Care Physician   Enmanuel Escobar      Consultations This Hospital Stay   PHARMACY TO DOSE VANCO  PHYSICAL THERAPY ADULT IP CONSULT  OCCUPATIONAL THERAPY ADULT IP CONSULT  NUTRITION SERVICES ADULT IP CONSULT  PHARMACY IP CONSULT  NUTRITION SERVICES ADULT IP CONSULT  CARDIAC REHAB IP CONSULT  PHYSICAL THERAPY ADULT IP CONSULT  OCCUPATIONAL THERAPY ADULT IP CONSULT  SURGERY GENERAL ADULT IP CONSULT  PALLIATIVE CARE ADULT  IP CONSULT  WOUND OSTOMY CONTINENCE NURSE  IP CONSULT  PHARMACY TO DOSE VANCO  PHARMACY IP CONSULT  NEUROLOGY CRITICAL CARE ADULT IP CONSULT  NEUROSURGERY ADULT IP CONSULT  PALLIATIVE CARE ADULT IP CONSULT  SMOKING CESSATION PROGRAM IP CONSULT      Discharge Orders   No discharge procedures on file.  Discharge Medications Allergies   Allergies   Allergen Reactions     Cephalexin Rash     [facility] from [facility] noted rash as 'severe'     Fluorouracil Unknown     Warfarin Unknown     TEVA BRAND ONLY     Clindamycin Rash     Penicillins Rash       Data   Most Recent 3 CBC's:  Recent Labs   Lab Test 01/26/20  1010 01/26/20  0355 01/25/20  2140   WBC 14.7* 16.0* 13.9*   HGB 8.8* 8.9* 8.5*   MCV 90 89 91   PLT 67* 89* 69*      Most Recent 3 BMP's:  Recent Labs   Lab Test 01/26/20  1010 01/26/20  0355 01/25/20  2140   * 152* 153*   POTASSIUM 3.7 3.6 3.6   CHLORIDE 119* 117* 115*   CO2 34* 33* 34*   BUN 69* 70* 75*   CR 1.86* 1.94* 2.05*   ANIONGAP <1* 2* 4   ELLA 7.9* 8.0* 7.6*   * 121* 124*     Most Recent 2 LFT's:  Recent Labs   Lab Test 01/26/20  0355 01/25/20  0342  01/23/20  0404   *  --   --  364*   * 177*   < > 202*   ALKPHOS 78  --   --  40   BILITOTAL 5.3*  --   --  2.8*    < > = values in this interval not displayed.     Most Recent INR's and Anticoagulation Dosing History:  Anticoagulation Dose History     Recent Dosing and Labs Latest Ref Rng & Units 1/24/2020 1/25/2020 1/25/2020 1/25/2020 1/25/2020 1/26/2020 1/26/2020    INR 0.86 - 1.14 1.56(H) 1.46(H) 1.45(H) 1.49(H) 1.49(H) 1.46(H) 1.43(H)        Most Recent 3 Troponin's:No lab results found.  Most Recent Cholesterol Panel:No lab results found.  Most Recent 6 Bacteria Isolates From Any Culture (See EPIC Reports for Culture Details):  Recent Labs   Lab Test 01/26/20  0757 01/25/20  0352 01/24/20  0359 01/19/20  1119 01/18/20  0358 01/17/20  1812   CULT No growth after 6 hours No growth after 1 day No growth after 2 days Candida albicans  / dubliniensis  isolated  Candida albicans and Candida dubliniensis are not routinely speciated  *  Light growth  Normal respiratory wendi    Culture received and in progress.  Positive AFB results are called as soon as detected.    Final report to follow in 7 to 8 weeks.    Assayed at [address] [place] [address] [phone] Heavy growth  Streptococcus pneumoniae  *  Heavy growth  Haemophilus influenzae  Beta lactamase negative  Beta-lactamase negative Haemophilus influenzae are usually susceptible to ampicillin,   amoxacillin/clavulanic acid, levofloxacin, and 3rd generation cephalosporins, such as   ceftriaxone.  *  Light growth  Normal wendi   No growth     Most Recent TSH, T4 and A1c Labs:  Recent Labs   Lab Test 01/23/20  0404   A1C 5.8*     Results for orders placed or performed during the hospital encounter of 01/17/20   XR Chest Port 1 View    Narrative    Exam:  XR CHEST PORT 1 VW, 1/17/2020 10:20 PM    History: respiratory failure    Comparison:  1/17/2020    Findings:  Portable supine radiograph of the chest. Endotracheal tube  tip projects approximately 4 cm above the cheyenne. Enteric tube is  collimated off the field-of-view. Median sternotomy wires. Esophageal  temperature probe projects over the midchest. Right IJ central venous  catheter projects over the high SVC. Cardiac silhouette is enlarged.  Small bilateral pleural effusions and dense retrocardiac opacities.  Mild diffuse interstitial opacities. No pneumothorax.      Impression    Impression:    1. Small bilateral pleural effusions and adjacent atelectasis and/or  consolidation.  2. Mild interstitial pulmonary edema.  3. Support devices as above.    I have personally reviewed the examination and initial interpretation  and I agree with the findings.    RHODA ANTHONY MD   XR Abdomen Port 1 View    Narrative    Exam:  XR ABDOMEN PORT 1 VW, 1/17/2020 10:21 PM    History: og verification    Comparison:   None    Findings:  Portable supine radiograph of the abdomen. Gastric tube  sidehole projects over the gastric antrum with tip likely in the  proximal duodenum. Epicardial pacing wires. No abnormally dilated  bowel. No pneumatosis or portal venous gas. Degenerative changes in  the spine.      Impression    Impression:    Gastric tube sidehole projects over the gastric antrum with the tip  likely in the proximal duodenum. Consider slight retraction.    I have personally reviewed the examination and initial interpretation  and I agree with the findings.    RHODA ANTHONY MD   XR Chest Port 1 View    Narrative    Exam: XR CHEST PORT 1 , 1/18/2020 8:23 AM    Indication: respiratory failure    Comparison: Chest x-ray 1/17/2020    Findings:   Portable semiupright AP radiograph of the chest. Endotracheal tube tip  projects 4.7 cm above the chey[place]. Right IJ central venous catheter tip  projects over the high SVC. Enteric tube courses below the diaphragm.  Esophageal temperature probe tip projects near the gastroesophageal  junction. Prominent cardiac silhouette with widening of the  mediastinum and increased interstitial markings bilaterally. Unchanged  hazy retrocardiac opacities. No pneumothorax. Trace bilateral pleural  effusions.      Impression    Impression:   1. Stable position of supportive devices.  2. Stable prominent cardiac silhouette with widened mediastinum  (related to known aortic dissection) and interstitial pulmonary edema.    3. Unchanged retrocardiac atelectasis versus consolidation.    I have personally reviewed the examination and initial interpretation  and I agree with the findings.    CARRIE RAMIREZ,    XR Chest Port 1 View    Narrative    Exam: XR CHEST PORT 1 , 1/19/2020 3:24 AM    Indication: respiratory failure    Comparison: Chest x-ray 1/18/2020    Findings:   Portable semiupright AP radiograph of the chest. Endotracheal tube tip  projects over the mid thoracic trachea. Right IJ  central venous  catheter tip projects over the high SVC. Enteric tube courses below  the diaphragm. Esophageal temperature probe tip projects near the  gastroesophageal junction. Prominent cardiac silhouette with widening  of the mediastinum and increased interstitial markings bilaterally.  Aortic valve prosthesis. Increased bilateral pleural effusions and  adjacent opacities. No pneumothorax.      Impression    Impression:   1. Stable position of supportive devices.  2. Stable prominent cardiac silhouette with widened mediastinum  (related to known aortic dissection) with slightly increased  interstitial pulmonary edema.   3. Increased bilateral pleural effusions and adjacent atelectasis.    I have personally reviewed the examination and initial interpretation  and I agree with the findings.    CARRIE RAMIREZ,    XR Chest Port 1 View    Narrative    EXAM: XR CHEST PORT 1 VW  1/20/2020 1:00 AM     HISTORY:  respiratory failure    COMPARISON: Chest radiograph dated 1/19/2020    FINDINGS: A single AP view of the chest is obtained. Endotracheal tube  tip projects over the midthoracic trachea. Right IJ central venous  catheter tip projects over the low SVC. Esophageal temperature probe  tip projects over the distal esophagus. Gastric tube courses below  left hemidiaphragm with tip off the field-of-view.    Stable cardiac silhouette. Bilateral layering pleural effusions.  Perihilar and bibasilar opacities. No pneumothorax. Stable left-sided  rib fracture deformities. No acute bony abnormality.      Impression    IMPRESSION:   1. Bilateral pleural effusions with perihilar and bibasilar  atelectasis.  2. Stable widening of the mediastinum, consistent with known aortic  dissection.    I have personally reviewed the examination and initial interpretation  and I agree with the findings.    MARIO GRISSOM MD   XR Chest Port 1 View    Narrative    EXAM: XR CHEST PORT 1 VW  1/21/2020 1:43 AM     HISTORY:  respiratory failure.   Per chart, History of Aortic  dissection s/p repair with new type A ascending aortic dissection    COMPARISON: Chest radiograph dated 1/20/2020    FINDINGS: A single AP view of the chest is obtained. Interval  extubation and removal of esophageal temperature probe and gastric  tube. Aortic valve prosthesis. Intact sternotomy wires. Right IJ  central venous catheter tip projects over the mid SVC.    Stable widening of the mediastinal silhouette, consistent with known  aortic dissection. Stable mild enlarged cardiac silhouette. Decreased  pleural effusions and basilar opacities. No pneumothorax. Perihilar  opacities, likely atelectasis. Stable left rib fractures.      Impression    IMPRESSION:   1. Extubation and removal of the gastric tube and esophageal  temperature probe.  2. Decreased pleural effusions and basilar opacities.    I have personally reviewed the examination and initial interpretation  and I agree with the findings.    LUCRECIA TRAMMELL MD   XR Chest Port 1 View    Narrative    Exam:  XR CHEST PORT 1 , 1/21/2020 6:33 PM    History: work of breathing worse, tachypnea    Comparison:  1/21/2020    Findings:  Single AP view of the chest. Right IJ central venous  catheter tip projects over the mid SVC. Aortic valve prosthesis and  median sternotomy wires. Cardiac silhouette is stable. Slightly  increased bilateral pleural effusions and adjacent opacities. No  significant change in diffuse mixed interstitial/airspace opacities.  No pneumothorax. Upper abdomen is unremarkable.      Impression    Impression:    1. Slightly increased small bilateral pleural effusions and adjacent  atelectasis and/or consolidation.  2. Mild interstitial pulmonary edema.    I have personally reviewed the examination and initial interpretation  and I agree with the findings.    MARIO GRISSOM MD   XR Chest Port 1 View    Narrative    EXAMINATION: XR CHEST PORT 1 VW, 1/21/2020 7:26 PM    INDICATION: intubation    COMPARISON: Chest  x-ray 1/21/2020 at 5:57 PM.    FINDINGS: Single portable AP radiograph the chest. ET tube projects  over the mid thoracic trachea. Right IJ central venous catheter tip  projects over the mid SVC. Trachea is stably deviated to the right.  Cardiac silhouette is stably enlarged with stable widening of the  mediastinum. Slight increase in small, right greater than left,  pleural effusions. No pneumothorax. Basilar atelectasis. Basilar  predominant interstitial and airspace opacities.      Impression    IMPRESSION:   1. ET tube projects over the mid thoracic trachea.  2. Slight increase in bilateral, right greater than left, pleural  effusions.  3. Basilar predominant interstitial airspace opacities, pulmonary  edema vs infection with atelectasis.    I have personally reviewed the examination and initial interpretation  and I agree with the findings.    MARIO GRISSOM MD   XR Chest Port 1 View    Narrative    EXAM: XR CHEST PORT 1 VW  1/22/2020 1:00 AM     HISTORY:  respiratory failure    COMPARISON: Chest radiograph dated 1/21/2020    FINDINGS: A single AP view of the chest is obtained. Endotracheal tube  tip projects over the midthoracic trachea. Right IJ central venous  catheter tip projects over the low SVC. Aortic valve prosthesis.  Gastric tube tip projects over the stomach. Median sternotomy wires.    Trachea is midline. Stable cardiac silhouette with mediastinal  widening, consistent with known aortic dissection. Right greater than  left pleural effusion. No pneumothorax. Perihilar and bibasilar  opacities.      Impression    IMPRESSION:   1. Endotracheal tube tip projects over the midthoracic trachea.  2. Right greater than left pleural effusions with perihilar and  bibasilar atelectasis.    I have personally reviewed the examination and initial interpretation  and I agree with the findings.    CARRIE RAMIREZ, DO   XR Chest Port 1 View     Value    Radiologist flags (Urgent)     Oley-Mayra catheter position,  bilateral pneumothoraces    Narrative    EXAM: XR CHEST PORT 1 VW  1/23/2020 3:00 AM     HISTORY:  S/P repair of aortic dissection    COMPARISON: Chest radiograph dated 1/22/2020    FINDINGS: A single AP view of the chest is obtained. Endotracheal tube  tip is not well-visualized due to overlying surgical sponge. Gastric  tube courses below the left hemidiaphragm with tip projecting over the  left upper quadrant ECMO cannulae. Right IJ central venous catheter  tip projects over the mid SVC. Left IJ Admire-Mayra catheter tip is  coiled in the right pulmonary artery with tip projecting over the  proximal right pulmonary artery. Mediastinal drains and bilateral  chest tubes. Aortic valve prosthesis. Epicardial pacer wires. Multiple  surgical sponges with known open chest per chart. Surgical clips  project over the right scapula.    Trachea is midline. Enlargement of the cardiomediastinal silhouette  status-post aortic dissection repair. Small right pneumothorax with  chest tube in place. Bilateral pleural effusions, greater on the  right. Patchy bibasilar and perihilar opacities. Subcutaneous  emphysema along the right chest wall and axilla.      Impression    IMPRESSION:   In this patient with known open chest:  1. New postoperative changes of aortic dissection repair with multiple  surgical sponges projecting over the mediastinum, consistent with open  postoperative chest.  2. Admire-Mayra catheter is coiled in the right main pulmonary artery and  crosses back toward midline with tip projecting over the proximal  right pulmonary artery. Recommend repositioning.  3. Small right pneumothorax with chest tubes in place.  4. Right greater than left pleural effusions with perihilar and  bibasilar atelectasis.    [Urgent Result: Admire-Mayra catheter position, bilateral pneumothoraces]    Finding was identified on 1/23/2020 3:00 AM.     Dr. Bahena was contacted by Dr. Guillen at 1/23/2020 3:14 AM and  verbalized understanding of the  urgent finding.     I have personally reviewed the examination and initial interpretation  and I agree with the findings.    RHODA ANTHONY MD   XR Fluoro Port Time 0/1 Hour    Narrative    This exam was marked as non-reportable because it will not be read by a   radiologist or a [facility] non-radiologist provider.             XR Chest Port 1 View    Narrative    EXAM: XR CHEST PORT 1 VW  1/24/2020 2:20 AM     HISTORY:  ECMO    COMPARISON: Chest radiograph dated 1/23/2020    FINDINGS: Frontal views of the chest. Endotracheal tube tip projects  over the midthoracic trachea. Gastric tube courses below left  hemidiaphragm with tip off the field-of-view. Aortic valve prosthesis.  Postoperative changes of aortic dissection repair. Right IJ central  venous catheter tip projects over the low SVC. Left IJ sheath tip  projects over the left innominate vein. Stable ECMO cannulae,  mediastinal drains, and chest tubes. Multiple surgical sponges project  over the chest. Crofton-Mayra catheter is removed.     Slightly decreased prominence of the cardiomediastinal silhouette.  Right greater than left pleural effusion, decreased from prior.  Prominent interstitial markings. Patchy lower lobe opacities.  Subcutaneous emphysema of the right chest wall.      Impression    IMPRESSION: 1. Open chest status-post aortic dissection repair with  multiple surgical sponges projecting over the mediastinum. Stable  position of the ECMO cannulae.  2. Crofton-Mayra catheter is removed. Additional support devices as above.  3. Decreased pleural effusions with right greater than left  interstitial and patchy airspace opacities.    I have personally reviewed the examination and initial interpretation  and I agree with the findings.    MARIO RGISSOM MD   XR Feeding Tube Placement    Addendum: 1/24/2020    I, THONY MARTIN MD, attest that I was present for all critical  portions of the procedure and was immediately available to provide  guidance and  assistance during the remainder of the procedure.    THONY MARTIN MD      Narrative    Feeding tube placement.    Comparison:  none.    History: Feeding tube needed for nutrition.     Fluoroscopy time:  4.8 minutes    Technique: After injection of Xylocaine gel into the right nostril, a  feeding tube was advanced under fluoroscopic guidance.    Findings: Multiple overlying lines and tubes demonstrated. The feeding  tube is advanced with the tip in the second portion of the duodenum. A  small amount of barium was injected to define anatomy.      Impression    Impression: Uncomplicated feeding tube placement with tip in the  second portion of the duodenum.    I have personally reviewed the examination and initial interpretation  and I agree with the findings.    THONY MARTIN MD   XR Chest Port 1 View    Narrative    Exam: XR CHEST PORT 1 VW, 1/25/2020 10:17 AM    Indication: open chest    Comparison: 1/24/2020    Findings:   AP views of the chest. Left and right chest tubes, 3 mediastinal  drains remain in place. Left and right chest tubes. At least 6 pieces  of gauze packing. Endotracheal tube with the tip in the mid thoracic  trachea. Gastric tube with the distal end and sidehole projecting over  the esophagus. Feeding tube with distal end extending beyond the  field-of-view. Left IJ approach venous sheath with the tip terminating  in the brachiocephalic vein. Aortic valve prosthesis. Unchanged ECMO  cannula.    Cardiac silhouette is enlarged, but unchanged. Mixed interstitial  opacities similar to prior exam. Subcutaneous emphysema most prominent  in the right axilla. Right greater than left pleural effusions.  Unchanged bilateral rib deformities. No large pneumothorax.  Pneumomediastinum is consistent with open chest. Radiodense material  projecting over the left upper quadrant presumably contrast. Stable  elevation of the right hemidiaphragm.      Impression    Impression:   1. Mixed interstitial are  opacities similar to prior exam. No new  focal consolidation.  2. Lines and support devices as detailed above.    I have personally reviewed the examination and initial interpretation  and I agree with the findings.    KATIE TOBIN MD   XR Chest Port 1 View    Narrative     Examination:  XR CHEST PORT 1 VW     Date:  1/26/2020 8:41 AM      Clinical Information: eval for pleural effusion     Additional Information: none    Comparison: 1/25/2020, 10:08 AM chest x-ray.    Findings:     Bilateral chest tubes are unchanged. There are multiple sponge type  densities projected over the mid chest, unchanged from the previous  study. The ET tube remains in good position. The enteric tube is  present and the tip is off the bottom of the film. Mediastinal drains  are unchanged. The left IJ line has a tip projected in the upper SVC  near the innominate confluence.      Impression    Impression:    1. Overall no change in the appearance of the chest when compared with  the 1/25/2020 examination..    GERBER GARCIA MD   CT Head w/o Contrast     Value    Radiologist flags Multiple infarcts (AA)    Narrative    CT HEAD W/O CONTRAST 1/26/2020 9:52 AM    Provided History: unreactive pupils  ICD-10:    Comparison: None.    Technique: Using multidetector thin collimation helical acquisition  technique, axial, coronal and sagittal CT images from the skull base  to the vertex were obtained without intravenous contrast.     Findings:    Multifocal areas of diffuse hypodensities, for example bilateral  occipital lobes, high right frontal lobe, right frontal, right  parietal lobe and patchy hypodensities noted in the cerebellum.  Diffuse cerebral edema with diffuse effacement of the cerebral sulci.  Ventriculomegaly with the third ventricle measuring 17 mm and the  anterior horn of the lateral ventricles measuring 23 mm. Septum cavum  pellucida.  No intracranial hemorrhage or midline shift. The basal  cisterns are effaced. There  is mild cerebellar tonsillar herniation.    Mucosal thickening in bilateral maxillary, ethmoidal and sphenoid  sinuses. The right nasal tube appears to loop within the oropharynx.  The mastoid air cells are clear.       Impression    Impression:  1. Multifocal areas of hypodensities concerning for multiple  infarctions. Concerning for embolic disease. Infectious or  inflammatory processes are thought less likely. Recommend MRI when  able.  2. Diffuse cerebral edema and mild cerebellar tonsillar herniation.  3. Ventriculomegaly.  4. The right nasoenteric tube appears to loop within the oropharynx.    [Critical Result: Multiple infarcts]    Finding was identified on 2020 9:53 AM.     Dr. Goldberg was contacted by Dr. Fierro at 2020 10:09 AM and  verbalized understanding of the critical finding.     I have personally reviewed the examination and initial interpretation  and I agree with the findings.    JOSSE CHING MD   Echo Complete    Narrative    [id number]  [id number]  [id number]  [id number]^JANE^MARIA DOLORES           [facility]  Echocardiography Laboratory  [address] [place] [id number]     Name: SYDNEY CANO  MRN: [id number]  : 1945  Study Date: 2020 01:54 PM  Age: 74 yrs  Gender: Male  Patient Location: [facility]  Reason For Study: Aortic Dissection  Ordering Physician: MARIA DOLORES LARA  Performed By: Hang Mendez  Height: 66 in     HR: 62  _____________________________________________________________________________  __        Procedure  Complete Portable Echo Adult. Optison (NDC #9664-7414-53) given intravenously.  Patient was given 9 ml mixture of 3 ml Optison and 6 ml saline. 0 ml wasted.  _____________________________________________________________________________  __        Interpretation Summary  Aortic dissection with flap visualized in the proximal arch with severely  dilated ascending aorta measuring 5.7 cm.  Left ventricular function, chamber size,  wall motion, and wall thickness are  normal.The EF is 55-60%.  S/P mechanical aortic valve. The aortic valve appears well-seated. Limited  Doppler interrogation of the aortic valve is normal (MG 5 mmHg.)  No pericardial effusion is present.  Previous study not available for comparison.  _____________________________________________________________________________  __        Left Ventricle  Left ventricular function, chamber size, wall motion, and wall thickness are  normal.The EF is 55-60%. Left ventricular diastolic function is not  assessable. No regional wall motion abnormalities are seen.     Right Ventricle  The right ventricle cannot be assessed.     Atria  The atria cannot be assessed.     Mitral Valve  The mitral valve cannot be assessed.     Aortic Valve  A mechanical aortic valve is present. Doppler interrogation of the aortic  valve is normal. The mean gradient is 5 mmHg.        Tricuspid Valve  The tricuspid valve cannot be assessed. The peak velocity of the tricuspid  regurgitant jet is not obtainable. Pulmonary artery systolic pressure cannot  be assessed.     Pulmonic Valve  The pulmonic valve cannot be assessed.     Vessels  Ascending aorta 5.7 cm. Aortic dissection is present involving the arch.  Dilation of the inferior vena cava is present with abnormal respiratory  variation in diameter.     Pericardium  No pericardial effusion is present.     Compared to Previous Study  Previous study not available for comparison.     _____________________________________________________________________________  __                       Report approved by: Rena Novak 01/17/2020 03:37 PM                 _____________________________________________________________________________  __      Echo Limited    Narrative    [id number]  [id number]  [id number]  [id number]^LEONIE^JESSI           [facility]  Echocardiography Laboratory  [address] [place] [id number]     Name:  SYDNEY CANO MAE  MRN: [id number]  : 1945  Study Date: 2020 11:35 AM  Age: 74 yrs  Gender: Male  Patient Location: [facility]  Reason For Study: Aortic Dissection  Ordering Physician: JESSI HADLEY  Performed By: NIKOLAI Joseph     BSA: 2.1 m2  Height: 70 in  Weight: 210 lb  BP: 87/62 mmHg  _____________________________________________________________________________  __        Procedure  Limited Portable Echo Adult. Technically difficult study. Poor acoustic  windows.  _____________________________________________________________________________  __        Interpretation Summary  Non-diagnostic study. Open chest, unable to visualize the heart.  _____________________________________________________________________________  __     _____________________________________________________________________________  __                          Report approved by: Rena Nance 2020 03:33 PM              _____________________________________________________________________________  __      Transesophageal Echocardiogram    [facility]    [id number]  Critical access hospital  [id number]  [id number]^LEONIE^JESSI           [facility]  Echocardiography Laboratory  [address] [place] [address]        Name: SYDNEY CANO  MRN: [id number]  : 1945  Study Date: 2020 02:33 PM  Age: 74 yrs  Gender: Male  Patient Location: [facility]  Reason For Study: Aortic Valve Replacement  Ordering Physician: JESSI HADLEY  Performed By: Maurizio Gómez MD     BSA: 2.1 m2  Height: 70 in  Weight: 210 lb  HR: 70  BP: 80/60 mmHg  _____________________________________________________________________________  __     Interpretation Summary  VA ECMO, open chest, RA/aortic cannulation. Turndown study.  Full support:  Global and regional left ventricular function is normal with an EF of 55-60%.  RV not well seen due to shadowing, and appears decompressed.  A mechanical aortic valve is present.  Doppler interrogation of the aortic  valve is normal.  No pericardial effusion is present.  ECMO inflow cannula seen in the RA.     With gradual turndown to 3.0 lpm and then 1.5 lpm, LV fxn improves to LVEF 60-  65%.  _____________________________________________________________________________  __        Procedure  The procedure was performed on Patient Floor. Informed consent for  Transesophegeal echo obtained. MONAE Probe #Epiq was used during the procedure.  Sedation, endotracheal intubation, and mechanical ventilation were initiated  prior to the MONAE and were monitored by the ICU team. The Transducer was  inserted without difficulty . The patient tolerated the procedure well.  Complications None.     Left Ventricle  Left ventricular size is normal. Left ventricular wall thickness is normal.  Global and regional left ventricular function is normal with an EF of 55-60%.     Right Ventricle  RV not well seen due to shadowing, and appears decompressed.     Atria  The right atria appears normal. The left atrial appendage cannot be assessed.  Severe left atrial enlargement is present. The atrial septum is intact as  assessed by color Doppler .        Mitral Valve  Trace mitral insufficiency is present.     Aortic Valve  A mechanical aortic valve is present. Doppler interrogation of the aortic  valve is normal.     Tricuspid Valve  The tricuspid valve cannot be assessed.     Pulmonic Valve  The pulmonic valve cannot be assessed.     Vessels  The aorta root is normal.        Pericardium  No pericardial effusion is present.     _____________________________________________________________________________  __                       Report approved by: Rena Nance 01/26/2020 12:54 PM           _____________________________________________________________________________  __

## 2020-01-26 NOTE — PROGRESS NOTES
Patient BP labile and MAPs dropped to 40-50s at 0820 and sustaining. Epi increased + levo restarted, + 1 unit PRBC + 500 albumin (per Dr. Perry). Patient dramatically rebounding with MAPs elevated into 140s. Pressors stopped, propofol and fentanyl boluses given.  Patient then stabilized hemodynamically. Upon neuro assessment pupils noted to be changed from previous assessments. Team notified and came to bedside, STAT Head CT ordered and completed. Neuro team consulted. Continuing to monitor and report changes to treatment team.

## 2020-01-26 NOTE — PROGRESS NOTES
ECMO Attending Progress Note  1/25/2020    Enmanuel Hernandez is a 74 year old male who was started on central [facility] ECMO DAY # 3 due to severe cardiac failure S/P aortic dissection repair.   Interval events:possible decannulation tomorrow  The patients vital signs today are as follows:    Temp:  [98.1  F (36.7  C)-98.8  F (37.1  C)] 98.2  F (36.8  C)  Heart Rate:  [] 61  Resp:  [12] 12  MAP:  [47 mmHg-124 mmHg] 56 mmHg  Arterial Line BP: ()/() 69/53  FiO2 (%):  [40 %] 40 %  SpO2:  [95 %-100 %] 99 %    Intake/Output Summary (Last 24 hours) at 1/26/2020 1133  Last data filed at 1/26/2020 1100  Gross per 24 hour   Intake 6287.18 ml   Output 6170 ml   Net 117.18 ml    Ventilation Mode: CMV/AC  (Continuous Mandatory Ventilation/ Assist Control)  FiO2 (%): 40 %  Rate Set (breaths/minute): 12 breaths/min  Tidal Volume Set (mL): 500 mL  PEEP (cm H2O): 10 cmH2O  Oxygen Concentration (%): 40 %  Resp: 12     Recent Labs   Lab 01/25/20  1540 01/25/20  0948 01/25/20  0342 01/24/20  2151   01/24/20  0328   01/23/20  0404 01/23/20  0258 01/23/20  0145   01/19/20  0329   AST  --   --   --   --   --   --   --  364* 395* 325*  --  87*   ALT  --   --  177*  --   --  185*  --  202* 211* 193*  --  144*   ALKPHOS  --   --   --   --   --   --   --  40 39* 35*  --  49   BILITOTAL  --   --   --   --   --   --   --  2.8* 2.7* 2.3*  --  1.6*   ALBUMIN  --   --  2.5*  --   --  2.3*  --  2.2* 1.8* 1.6*  --  2.8*   INR 1.49* 1.45* 1.46* 1.56*   < > 1.43*   < > 0.93 1.91* 1.64*   < > 1.38*    < > = values in this interval not displayed.      Pancreatic Enzymes  No lab results found in last 7 days.  Coagulation Profile         Recent Labs   Lab 01/25/20  1540 01/25/20  0948 01/25/20  0342 01/24/20  2151   INR 1.49* 1.45* 1.46* 1.56*   PTT 66* 49* 46* 36        Physical Exam:unchanged  Cannula unchanged.  Minimal oozing.  Minimal air movement  Extremities edematous    ECMO Issues including assessments and plan on DOS  1/26/2020:    Neuro: Sedated for mechanical ventilation and ECMO.  NIRSRASS goal:   CV: Hemodynamically stable on ECMO   Pulm: Severe respiratory failure requiring ECMO and mechanical ventilation. Flows unchanged at 4.5  Keep vent settings at rest settings: PC 25, PEEP10, FiO2 60%.  FEN/Renal: Creatinine  Lab Results   Component Value Date    CR 1.86 01/26/2020   , stable   UOP stable  Heme: Hemoglobin 8 .  Goals: if O2 sat >85% Hgb may drift to 7-8.  If O2 Sat <85% keep Hgb 10-12.  ID: none     All pertinent labs, imaging studies, physical exam and medications have been reviewed by me.     This patient requires continued ICU monitoring and cares.  I have discussed patient care and treatment plan with the ICU team and the patient's family.     ECMO [id number]    Kareem Montes MD  Cardiac Surgery  Pager [phone]

## 2020-01-26 NOTE — PROGRESS NOTES
CVTS PROGRESS NOTE  1/25/2020  Enmanuel Hernandez  [id number]  Admitted: 1/17/2020 12:21 PM      CO-MORBIDITIES:   Dissection of aorta, unspecified portion of aorta (H)    ASSESSMENT: Enmanuel Hernandez is a 74 year old male who was admitted for aortic dissection. Patient transferred from [place] 1/16/20 and then transferred to [facility] in [place]. Air lifted to [facility] 1/17/20 where patient was found to be a prohibitively high risk of operation given his history of AVR. Will medically manage and optimize to reassess risk of surgery.  Discussed code status with wife, patient does not want Chest compressions but ok with having an ETT.  Bronchial cultures positive for haemophilus and strep pneumonia, started on Zosyn-Vanc (1/17) and patient narrowed to ceftriaxone (1/20). Extubated 1/20 but re-intubated 1/21 due to acute hypoxic respiratory failure. Pt underwent Type A aortic valve repair with hypothermic circulatory arrest on 1/22 and was started on VA ECMO due to inability to come off CP bypass intraoperatively. MTP also started due to coagulopathy intra-op. Chest washout (1/23). MTP stopped post-op. Back to OR tentatively on 1/26.    TODAY'S PROGRESS:   - follow up evening labs q4  - OR tomorrow for washout  - MONAE turndown, did well, likely decannulation tomorrow  - Changed ACT goals 160-180  - Heparin gtt  - AM LFTs    PLAN:  Neurological:  #Perioperative Pain and anxiety   - Propofol gtt   - Precedex gtt   - Fentanyl gtt     Pulmonary:   #Acute hypoxic respiratory failure  #Strep pneumonia with superimposed haemophilus influenza   - Stop ceftriaxone (1/23)   - Hold Mucomyst nebs     Cardiovascular:    #History of AVR  #History of Aortic dissection s/p repair with new type A ascending aortic dissection  #S/P Type A aortic dissection repair with open chest   - Start Cefepime for open chest  #Heart failure   - VA ECMO   - Wean Flolan  #A flutter   - 2 ventricular paced    - Maintain MAP >65   - Diuresis to CVP goal  8-12   - Monitor via flotrak   - Stop Metoprolol 25mg BID     Gastroenterology/Nutrition:  #Shock Liver   - Maintain MAP >65  - NPO for now  - Trend lactic acid  - Neutra-phos  - PPI  - Place NJ today     Electrolytes/Renal:  #Hypervolemia   - Acetazolamide, 1g once  #Hypernatremia   - Strict I&Os, ayala to remain   - Trend creatinine   - Diuresis to CVP goal 8-12   - FW to 40 ml/hr     Endocrine:  # Hyperglycemia   - SSI    - Goal to keep BG< 180 mg/dL    ID:  #Strep pneumonia with superimposed haemophilus influenza   - Stop Ceftriaxone (1/23)  #Open Chest   - Vancomycin for post-op ppx   - Start Cefepime, 2g Q8H    Heme:  #Coagulopathy   - Now off MTP   - Hold Heparin gtt    - TEG assay   - Transfuse for Hb >8, Platelets >50    General Cares/Prophylaxis:    DVT Prophylaxis: Heparin gtt (held)  GI Prophylaxis: PPI    Lines/ tubes/ drains:  - ETT, PIVx3, A.line x2, RIJ CVC, LIJ PAC, NG, chest tube x 6, ayala, rectal tube   - Remove L A. Line, remove R IJ    Disposition: CV ICU    Patient seen, findings and plan discussed with CVTS    Herbert Muñiz MD  Anesthesiology Resident CA2, PGY3  CVICU    ====================================    TODAY'S PROGRESS:   SUBJECTIVE:    s/p type A aortic dissection repair with deep hypothermic circulatory arrest. No acute events overnight.     OBJECTIVE:   1. VITAL SIGNS:   Temp:  [98.1  F (36.7  C)-98.8  F (37.1  C)] 98.8  F (37.1  C)  Heart Rate:  [56-82] 79  Resp:  [12] 12  MAP:  [55 mmHg-98 mmHg] 66 mmHg  Arterial Line BP: ()/(49-87) 85/60  FiO2 (%):  [40 %] 40 %  SpO2:  [94 %-100 %] 97 %  Ventilation Mode: CMV/AC  (Continuous Mandatory Ventilation/ Assist Control)  FiO2 (%): 40 %  Rate Set (breaths/minute): 12 breaths/min  Tidal Volume Set (mL): 500 mL  PEEP (cm H2O): 10 cmH2O  Oxygen Concentration (%): 40 %  Resp: 12      2. INTAKE/ OUTPUT:   I/O last 3 completed shifts:  In: 6148.86 [I.V.:2893.86; NG/GT:1110]  Out: 3685 [Urine:2580; Emesis/NG output:25; Chest  Tube:1080]    3. PHYSICAL EXAMINATION:   General: Intubated, sedated, supine  HEENT: moist mucous membranes   Neuro: Sedated  Pulm/Resp: Mechanical breath sounds bilaterally  CV: RRR  Abdomen: Soft, distended, unable to assess pain d/t sedation  : ayala catheter in place  MSK/Extremities: peripheral pulses intact, extremities well perfused    4. INVESTIGATIONS:   Arterial Blood Gases   Recent Labs   Lab 01/25/20  1953 01/25/20  1756 01/25/20  1540 01/25/20  1502   PH 7.48* 7.43 7.46* 7.51*   PCO2 35 49* 46* 42   PO2 126* 118* 79* 69*   HCO3 26 32* 33* 33*     Complete Blood Count   Recent Labs   Lab 01/25/20  1540 01/25/20  0948 01/25/20  0342 01/24/20  2151   WBC 13.5* 14.1* 13.7* 13.7*   HGB 7.8* 8.3* 8.8* 8.2*   PLT 78* 92* 84* 69*     Basic Metabolic Panel  Recent Labs   Lab 01/25/20  1540 01/25/20  0948 01/25/20  0342 01/24/20  2151   * 151* 150* 152*   POTASSIUM 3.6 3.6 3.8 3.9   CHLORIDE 112* 112* 110* 110*   CO2 34* 35* 38* 36*   BUN 73* 76* 73* 68*   CR 2.16* 2.25* 2.28* 2.21*   * 139* 144* 151*     Liver Function Tests  Recent Labs   Lab 01/25/20  1540 01/25/20  0948 01/25/20  0342 01/24/20  2151  01/24/20  0328  01/23/20  0404 01/23/20  0258 01/23/20  0145  01/19/20  0329   AST  --   --   --   --   --   --   --  364* 395* 325*  --  87*   ALT  --   --  177*  --   --  185*  --  202* 211* 193*  --  144*   ALKPHOS  --   --   --   --   --   --   --  40 39* 35*  --  49   BILITOTAL  --   --   --   --   --   --   --  2.8* 2.7* 2.3*  --  1.6*   ALBUMIN  --   --  2.5*  --   --  2.3*  --  2.2* 1.8* 1.6*  --  2.8*   INR 1.49* 1.45* 1.46* 1.56*   < > 1.43*   < > 0.93 1.91* 1.64*   < > 1.38*    < > = values in this interval not displayed.     Pancreatic Enzymes  No lab results found in last 7 days.  Coagulation Profile  Recent Labs   Lab 01/25/20  1540 01/25/20  0948 01/25/20  0342 01/24/20  2151   INR 1.49* 1.45* 1.46* 1.56*   PTT 66* 49* 46* 36     Lactate  Invalid input(s): LACTATE    5. RADIOLOGY:  "    \"EXAM: XR CHEST PORT 1 VW  1/24/2020 2:20 AM      HISTORY:  ECMO     COMPARISON: Chest radiograph dated 1/23/2020     FINDINGS: Frontal views of the chest. Endotracheal tube tip projects  over the midthoracic trachea. Gastric tube courses below left  hemidiaphragm with tip off the field-of-view. Aortic valve prosthesis.  Postoperative changes of aortic dissection repair. Right IJ central  venous catheter tip projects over the low SVC. Left IJ sheath tip  projects over the left innominate vein. Stable ECMO cannulae,  mediastinal drains, and chest tubes. Multiple surgical sponges project  over the chest. [place]-Mayra catheter is removed.      Slightly decreased prominence of the cardiomediastinal silhouette.  Right greater than left pleural effusion, decreased from prior.  Prominent interstitial markings. Patchy lower lobe opacities.  Subcutaneous emphysema of the right chest wall.                                                                      IMPRESSION: 1. Open chest status-post aortic dissection repair with  multiple surgical sponges projecting over the mediastinum. Stable  position of the ECMO cannulae.  2. [place]-Mayra catheter is removed. Additional support devices as above.  3. Decreased pleural effusions with right greater than left  interstitial and patchy airspace opacities.     I have personally reviewed the examination and initial interpretation  and I agree with the findings.     MARIO GRISSOM MD\"    =========================================      "

## 2020-01-26 NOTE — PROGRESS NOTES
CVTS PROGRESS NOTE  1/26/2020  Enmanuel Hernandez  [id number]  Admitted: 1/17/2020 12:21 PM      CO-MORBIDITIES:   Dissection of aorta, unspecified portion of aorta (H)    ASSESSMENT: Enmanuel Hernandez is a 74 year old male who was admitted for aortic dissection. Patient transferred from [place] 1/16/20 and then transferred to [facility] in [place]. Air lifted to [facility] 1/17/20 where patient was found to be a prohibitively high risk of operation given his history of AVR. Will medically manage and optimize to reassess risk of surgery.  Discussed code status with wife, patient does not want Chest compressions but ok with having an ETT.  Bronchial cultures positive for haemophilus and strep pneumonia, started on Zosyn-Vanc (1/17) and patient narrowed to ceftriaxone (1/20). Extubated 1/20 but re-intubated 1/21 due to acute hypoxic respiratory failure. Pt underwent Type A aortic valve repair with hypothermic circulatory arrest on 1/22 and was started on VA ECMO due to inability to come off CP bypass intraoperatively. MTP also started due to coagulopathy intra-op. Chest washout (1/23). MTP stopped post-op. Pupils noted to be fixed and dilated today.  CT demonstrated herniation with extensive multifocal infarcts.  Neuro critical care and neuro surgery consulted.  Poor prognosis, no plan for surgical intervention.  OR cancelled for today.  Extensive discussion with family today, palliative consulted, for continued goals of care.  Will likely transition to comfort cares    TODAY'S PROGRESS:   - Palliative consulted  - OR on hold  - Family care meeting  - Consulted neurosurg and neurocrit    PLAN:  Neurological:  #Perioperative Pain and anxiety  #Multifocal infarctions likely 2/2 embolic stroke vs. ischemic   - Precedex gtt   - Fentanyl gtt   - Neurosurgery consulted, appreciate recommendations   - Neurocritical care consulted, appreciate recommendations     Pulmonary:   #Acute hypoxic respiratory failure  #Strep pneumonia with  superimposed haemophilus influenza   - Stop ceftriaxone (1/23)   - Hold Mucomyst nebs     Cardiovascular:    #History of AVR  #History of Aortic dissection s/p repair with new type A ascending aortic dissection  #S/P Type A aortic dissection repair with open chest   - Start Cefepime for open chest  #Heart failure   - VA ECMO   - Wean Flolan  #A flutter   - biventricular pacing    - Maintain MAP >65   - Diuresis to CVP goal 8-12   - Monitor via flotrak   - Stop Metoprolol 25mg BID     Gastroenterology/Nutrition:  #Shock Liver   - Maintain MAP >65  - NPO for now  - Trend lactic acid  - Neutra-phos  - PPI  - Place NJ today     Electrolytes/Renal:  #Hypervolemia   - Acetazolamide, 1g once  #Hypernatremia   - Strict I&Os, ayala to remain   - Trend creatinine   - Diuresis to CVP goal 8-12   - FW to 40 ml/hr     Endocrine:  # Hyperglycemia   - SSI    - Goal to keep BG< 180 mg/dL    ID:  #Strep pneumonia with superimposed haemophilus influenza   - Stop Ceftriaxone (1/23)  #Open Chest   - Vancomycin for post-op ppx   -Cefepime, 2g Q8H    Heme:  #Coagulopathy   - Heparin gtt    - Transfuse for Hb >8, Platelets >50    General Cares/Prophylaxis:    DVT Prophylaxis: Heparin gtt  GI Prophylaxis: PPI    Lines/ tubes/ drains:  - ETT, PIVx3, A.line x2, LIJ PAC, NG, chest tube x 6, ayala, rectal tube    Disposition: CV ICU    Patient seen, findings and plan discussed with CVTS Fellow    Rashel Goldberg Jr., MD  Surgical ICU Fellow  Pager: [phone]  1/26/2020  3:36 PM    ====================================    TODAY'S PROGRESS:   SUBJECTIVE:    Pupil fixed and dilated this AM.    OBJECTIVE:   1. VITAL SIGNS:   Temp:  [98.1  F (36.7  C)-98.8  F (37.1  C)] 98.4  F (36.9  C)  Heart Rate:  [] 81  Resp:  [12] 12  MAP:  [47 mmHg-124 mmHg] 82 mmHg  Arterial Line BP: ()/() 112/72  FiO2 (%):  [40 %] 40 %  SpO2:  [96 %-100 %] 99 %  Ventilation Mode: CMV/AC  (Continuous Mandatory Ventilation/ Assist Control)  FiO2 (%): 40  %  Rate Set (breaths/minute): 12 breaths/min  Tidal Volume Set (mL): 500 mL  PEEP (cm H2O): 10 cmH2O  Oxygen Concentration (%): 40 %  Resp: 12      2. INTAKE/ OUTPUT:   I/O last 3 completed shifts:  In: 5385.18 [I.V.:1966.18; NG/GT:1200]  Out: 6435 [Urine:4570; Emesis/NG output:175; Chest Tube:1690]    3. PHYSICAL EXAMINATION:   General: Intubated, sedated, supine  HEENT: moist mucous membranes, fixed dilated pupils equally   Neuro: Sedated  Pulm/Resp: Mechanical breath sounds bilaterally  CV: RRR  Abdomen: Soft, distended, unable to assess pain d/t sedation  : ayala catheter in place  MSK/Extremities: peripheral pulses intact, extremities well perfused    4. INVESTIGATIONS:   Arterial Blood Gases   Recent Labs   Lab 01/26/20  1351 01/26/20  1010 01/26/20  0355 01/26/20  0156   PH 7.44 7.45 7.50* 7.48*   PCO2 38 45 39 39   PO2 154* 174* 165* 141*   HCO3 26 31* 31* 29*     Complete Blood Count   Recent Labs   Lab 01/26/20  1010 01/26/20  0355 01/25/20  2140 01/25/20  1540   WBC 14.7* 16.0* 13.9* 13.5*   HGB 8.8* 8.9* 8.5* 7.8*   PLT 67* 89* 69* 78*     Basic Metabolic Panel  Recent Labs   Lab 01/26/20  1010 01/26/20  0355 01/25/20  2140 01/25/20  1540   * 152* 153* 151*   POTASSIUM 3.7 3.6 3.6 3.6   CHLORIDE 119* 117* 115* 112*   CO2 34* 33* 34* 34*   BUN 69* 70* 75* 73*   CR 1.86* 1.94* 2.05* 2.16*   * 121* 124* 156*     Liver Function Tests  Recent Labs   Lab 01/26/20  1010 01/26/20  0355 01/25/20  2140 01/25/20  1540  01/25/20  0342  01/24/20  0328  01/23/20  0404 01/23/20  0258 01/23/20  0145   AST  --  115*  --   --   --   --   --   --   --  364* 395* 325*   ALT  --  138*  --   --   --  177*  --  185*  --  202* 211* 193*   ALKPHOS  --  78  --   --   --   --   --   --   --  40 39* 35*   BILITOTAL  --  5.3*  --   --   --   --   --   --   --  2.8* 2.7* 2.3*   ALBUMIN  --  2.8*  --   --   --  2.5*  --  2.3*  --  2.2* 1.8* 1.6*   INR 1.43* 1.46* 1.49* 1.49*   < > 1.46*   < > 1.43*   < > 0.93 1.91*  "1.64*    < > = values in this interval not displayed.     Pancreatic Enzymes  No lab results found in last 7 days.  Coagulation Profile  Recent Labs   Lab 01/26/20  1010 01/26/20  0355 01/25/20  2140 01/25/20  1540   INR 1.43* 1.46* 1.49* 1.49*   PTT 77* 75* 85* 66*     Lactate  Invalid input(s): LACTATE    5. RADIOLOGY:     \"EXAM: XR CHEST PORT 1 VW  1/24/2020 2:20 AM      HISTORY:  ECMO     COMPARISON: Chest radiograph dated 1/23/2020     FINDINGS: Frontal views of the chest. Endotracheal tube tip projects  over the midthoracic trachea. Gastric tube courses below left  hemidiaphragm with tip off the field-of-view. Aortic valve prosthesis.  Postoperative changes of aortic dissection repair. Right IJ central  venous catheter tip projects over the low SVC. Left IJ sheath tip  projects over the left innominate vein. Stable ECMO cannulae,  mediastinal drains, and chest tubes. Multiple surgical sponges project  over the chest. Midway-Mayra catheter is removed.      Slightly decreased prominence of the cardiomediastinal silhouette.  Right greater than left pleural effusion, decreased from prior.  Prominent interstitial markings. Patchy lower lobe opacities.  Subcutaneous emphysema of the right chest wall.                                                                      IMPRESSION: 1. Open chest status-post aortic dissection repair with  multiple surgical sponges projecting over the mediastinum. Stable  position of the ECMO cannulae.  2. Midway-Mayra catheter is removed. Additional support devices as above.  3. Decreased pleural effusions with right greater than left  interstitial and patchy airspace opacities.     I have personally reviewed the examination and initial interpretation  and I agree with the findings.     MARIO GRISSOM MD\"    =========================================      "

## 2020-01-26 NOTE — CONSULTS
"[facility], [place]      Stroke Consult Note    Reason for Consult:  ASAP consult request for \"multifocal infarcts seen on CT head\"      HPI  Enmanuel Hernandez is a 74 year old male who was admitted with an aortic dissection 1/16 s/p aortic valve repair with circulatory arrest 1/22 s/p ECMO as patient has been able to come off bypass intraoperatively.  His chest remains open, he remains on propofol, Precedex and fentanyl and was noted to have a change in his neurologic exam at 8 AM the morning 1/26.  At this time nursing noted that his pupils were fixed and nonreactive bilaterally.  It appears that a STAT head CT was ordered at that time and a consult was placed to the stroke/neuro critical care team.  The stroke code was paged/activated once the CT head revealed multiple strokes including within the posterior fossa as well as herniation of the cerebellar tonsils.    Mr. Hernandez was evaluated within 2 minutes of receiving the stroke code and he was found to be on sedation with Precedex at 0.4 MCG, fentanyl 50 MCG, propofol 15 MCG.  Patient cannot be interviewed directly given the intubation and level of sedation, but nursing staff described that the change in exam had been fixed and dilated pupils since 8 AM earlier in the morning.  He had not been interactive with neurologic exams since his admission.  The primary team and nursing staff was following his pupil reactivity, however, and this is what had changed to unreactive pupils per nursing staff.  Last known normal is not readily available, and prior CT heads have not been performed since his admission here.      TPA Treatment   Not given due to outside the time window, current / recent anticoagulant use with INR > 1.7, major infarct (hypodensity > 1/3 hemisphere on CT), major surgery / trauma in past 14 days.    Endovascular Treatment  CT angiogram was not performed, so vessels could not be assessed, however patient is not a candidate " for thrombectomy given his multiple strokes with edema and herniation.    Impression  Ischemic Stroke due to cardioembolism     Mr. Hernandez is a 74-year-old man who presented with a aortic dissection who is now status post cardiothoracic surgery, continued on ECMO who was found to have a change in neurologic exam concerning for herniation and a CT head confirming multiple completed cardioembolic strokes and herniation.  Unfortunately there is no neurologic intervention that can be performed based on the appearance of the head imaging.  He does not appear to be a candidate for a decompression given that he is already herniated and has other severe comorbidities making him a poor surgical candidate.  We did recommend discussion with neurosurgery to ensure that they are in agreement with this assessment, and the ICU team communicated that they plan to notify neurosurgery.  He is also not a candidate for TPA or for thrombectomy- neither one of these interventions would improve his current condition and in fact could contribute to serious complications.    As of now his neurologic exam is confounded by his multiple sedating agents.  He currently has no brainstem reflexes, cannot localize pain, or any apparent movement of his extremities.  A true assessment of his examination could only be performed once these medications have been off for 5 half-lives.    The stroke and neurology ICU team sat down with the patient's family immediately following these conclusions to share that his prognosis was in fact very poor and that with our current level of information, he would not expect to survive these injuries and subsequent edema within the cerebrum and cerebellum.    Recommendations  - Confirm with neurosurgery whether or not patient is a candidate for decompression  - No current indication for hypertonics  - No current indication for repeat head/brain imaging    Patient Follow-up    - Per cardiology and cardiothoracic  team      Please contact the Stroke Service with any questions.    The patient was discussed with Stroke Fellow, Dr. Walter.  The Stroke Staff is Dr. Luna.    Manfred Franco MD  Neurology Resident PGY3   __________________________________________________    No past medical history on file.    Past Surgical History:   Procedure Laterality Date     INCISION AND DRAINAGE CHEST WASHOUT, COMBINED N/A 1/23/2020    Procedure: EMERGENT INCISION AND DRAINAGE, WOUND, CHEST, WITH IRRIGATION;  Surgeon: Mitch Weeks MD;  Location: [facility] OR     REPAIR ANEURYSM ASCENDING AORTA N/A 1/22/2020    Procedure: REDO MEDIAN STERNOTOMY, ON CARDIOPULMONARY BYPASS, ASCENDING AORTIC ANEURYSM REPAIR;  Surgeon: Mitch Weeks MD;  Location: [facility] OR       Medications   Current Facility-Administered Medications   Medication     acetaminophen (TYLENOL) tablet 650 mg     Beta Blocker NOT indicated pre-operatively for the patient     ceFEPIme (MAXIPIME) 1 g in D5W 100 mL intermittent infusion     dexmedetomidine (PRECEDEX) 400 mcg in 0.9% sodium chloride 100 mL     dextrose 10% infusion     glucose gel 15-30 g    Or     dextrose 50 % injection 25-50 mL    Or     glucagon injection 1 mg     EPINEPHrine (ADRENALIN) 16 mg in sodium chloride 0.9 % 250 mL infusion     fentaNYL (SUBLIMAZE) infusion     furosemide (LASIX) injection 20 mg     heparin 100 UNIT/ML injection 370-730 Units     heparin 25,000 units in 0.45% NaCl 250 mL ANTICOAGULANT  infusion     HOLD:  All AM Medications     hydrALAZINE (APRESOLINE) injection 10 mg     hydrOXYzine (ATARAX) tablet 10 mg     insulin 1 unit/mL in saline (NovoLIN, HumuLIN Regular) drip - ADULT IV Infusion     insulin aspart (NovoLOG) inj (RAPID ACTING)     insulin aspart (NovoLOG) inj (RAPID ACTING)     lidocaine (LMX4) cream     lidocaine 1 % 0.1-1 mL     magnesium sulfate 2 g in NS intermittent infusion (PharMEDium or FV Cmpd)     magnesium sulfate 4 g in 100 mL sterile water (premade)      melatonin tablet 1 mg     meperidine (DEMEROL) injection 12.5-25 mg     metroNIDAZOLE (FLAGYL) infusion 500 mg     multivitamins w/minerals (CERTAVITE) liquid 15 mL     naloxone (NARCAN) injection 0.1-0.4 mg     norepinephrine (LEVOPHED) 16 mg in  mL infusion     ondansetron (ZOFRAN-ODT) ODT tab 4 mg    Or     ondansetron (ZOFRAN) injection 4 mg     oxyCODONE (ROXICODONE) tablet 5-10 mg     pantoprazole (PROTONIX) 40 mg IV push injection     Patient RECEIVING antibiotic to treat a different condition and it provides ADEQUATE COVERAGE for this surgical procedure.     potassium chloride (KLOR-CON) Packet 20-40 mEq     potassium chloride 10 mEq in 100 mL intermittent infusion with 10 mg lidocaine     potassium chloride 10 mEq in 100 mL sterile water intermittent infusion (premix)     potassium chloride 20 mEq in 50 mL intermittent infusion     potassium chloride ER (K-DUR/KLOR-CON M) CR tablet 20-40 mEq     propofol (DIPRIVAN) infusion     protein modular (PROSOURCE TF) 1 packet     Reason beta blocker order not selected     senna-docusate (SENOKOT-S/PERICOLACE) 8.6-50 MG per tablet 1 tablet    Or     senna-docusate (SENOKOT-S/PERICOLACE) 8.6-50 MG per tablet 2 tablet     sodium chloride (PF) 0.9% PF flush 3 mL     sodium chloride (PF) 0.9% PF flush 3 mL     vancomycin place slaughter - receiving intermittent dosing     vasopressin (VASOSTRICT) 40 Units in D5W 40 mL infusion       Allergies   Allergies   Allergen Reactions     Cephalexin Rash     [facility] from [facility] noted rash as 'severe'     Fluorouracil Unknown     Warfarin Unknown     TEVA BRAND ONLY     Clindamycin Rash     Penicillins Rash       Family History       Social History   Social History     Socioeconomic History     Marital status:      Spouse name: Not on file     Number of children: Not on file     Years of education: Not on file     Highest education level: Not on file   Occupational History     Not on file   Social Needs     Financial  resource strain: Not on file     Food insecurity:     Worry: Not on file     Inability: Not on file     Transportation needs:     Medical: Not on file     Non-medical: Not on file   Tobacco Use     Smoking status: Not on file   Substance and Sexual Activity     Alcohol use: Not on file     Drug use: Not on file     Sexual activity: Not on file   Lifestyle     Physical activity:     Days per week: Not on file     Minutes per session: Not on file     Stress: Not on file   Relationships     Social connections:     Talks on phone: Not on file     Gets together: Not on file     Attends Tenriism service: Not on file     Active member of club or organization: Not on file     Attends meetings of clubs or organizations: Not on file     Relationship status: Not on file     Intimate partner violence:     Fear of current or ex partner: Not on file     Emotionally abused: Not on file     Physically abused: Not on file     Forced sexual activity: Not on file   Other Topics Concern     Not on file   Social History Narrative     Not on file          ROS  Review of systems not obtained due to patient factors - critical condition, mental status, intubation and sedation    PHYSICAL EXAMINATION  B/P:     (!) 86/67 (01/23/20 1722)    Heart Rate: 61 (01/26/20 1100)    Pulse: 86 (01/23/20 1722)   Resp:  12 (01/26/20 1000)  Temp: 98.2  F (36.8  C)   Bladder (01/26/20 0400)    General:  Patient is intubated and sedated    HEENT:  normocephalic/atraumatic  Cardio:  Patient is on ECMO, chest is open  Pulmonary:  on mechanical ventilation  Abdomen:  non-distended  Extremities:  no edema  Skin:  intact, warm/dry     Neurologic exam: While on sedation, patient has no response to oculocephalic maneuver, pupils not responsive to light, corneas not responsive to stimulation, there is no response with deep suctioning, there is no response to pressure on the supraorbital notch or to nailbed pressure of any extremity.  Patient does not appear to have  intact sensation to noxious stimuli in any extremity, nor does he have any motor response to noxious stimulation  Toes do not move in response to plantar stimulation, there is no clonus    Stroke Scales  Patient is on sedation with fentanyl, Precedex and propofol during exam.   National [employer] Stroke Scale  Exam Interval: Baseline   Score    Level of consciousness: (3)   Responds w/ reflex motor/autonomic effect or no response    LOC questions: (2)   Answers neither question correctly    LOC commands: (2)   Performs neither task correctly    Best gaze: (0)   Normal    Visual: (3)   Bilateral hemianopia (blind including cortical blindness)    Facial palsy: (0)   Normal symmetrical movements    Motor arm (left): (4)   No movement    Motor arm (right): (4)   No movement    Motor leg (left): (4)   No movement    Motor leg (right): (4)   No movement    Limb ataxia: (0)   Absent    Sensory: (2)   Severe to total sensory loss    Best language: (3)   Mute- global aphasia    Dysarthria: UN Intubated or other physical barrier: intubated    Extinction and inattention: (2)   Profound lynn-inattention / extinction > one modality        Total Score:  33       Labs/Imaging  Labs and imaging were reviewed and used in developing plan; pertinent results included.  Data   CBC  WBC (10e9/L)   Date Value   01/26/2020 14.7 (H)   01/26/2020 16.0 (H)   01/25/2020 13.9 (H)    RBC Count (10e12/L)   Date Value   01/26/2020 3.01 (L)   01/26/2020 3.03 (L)   01/25/2020 2.89 (L)    Hemoglobin (g/dL)   Date Value   01/26/2020 8.8 (L)   01/26/2020 8.9 (L)   01/25/2020 8.5 (L)      Hematocrit (%)   Date Value   01/26/2020 27.1 (L)   01/26/2020 27.1 (L)   01/25/2020 26.4 (L)    Platelet Count (10e9/L)   Date Value   01/26/2020 67 (L)   01/26/2020 89 (L)   01/25/2020 69 (L)         BMP  Sodium (mmol/L)   Date Value   01/26/2020 154 (H)   01/26/2020 152 (H)   01/25/2020 153 (H)    Potassium (mmol/L)   Date Value   01/26/2020 3.7    01/26/2020 3.6   01/25/2020 3.6    Chloride (mmol/L)   Date Value   01/26/2020 119 (H)   01/26/2020 117 (H)   01/25/2020 115 (H)      Carbon Dioxide (mmol/L)   Date Value   01/26/2020 34 (H)   01/26/2020 33 (H)   01/25/2020 34 (H)    Glucose (mg/dL)   Date Value   01/26/2020 140 (H)   01/26/2020 121 (H)   01/25/2020 124 (H)    Urea Nitrogen (mg/dL)   Date Value   01/26/2020 69 (H)   01/26/2020 70 (H)   01/25/2020 75 (H)      Creatinine (mg/dL)   Date Value   01/26/2020 1.86 (H)   01/26/2020 1.94 (H)   01/25/2020 2.05 (H)    Calcium (mg/dL)   Date Value   01/26/2020 7.9 (L)   01/26/2020 8.0 (L)   01/25/2020 7.6 (L)         INR Troponin I A1C   INR (no units)   Date Value   01/26/2020 1.43 (H)   01/26/2020 1.46 (H)   01/25/2020 1.49 (H)    No results found for: TROPI Hemoglobin A1C (%)   Date Value   01/23/2020 5.8 (H)        Liver Panel  Protein Total (g/dL)   Date Value   01/26/2020 5.1 (L)   01/23/2020 4.1 (L)   01/23/2020 3.5 (L)    Albumin (g/dL)   Date Value   01/26/2020 2.8 (L)   01/25/2020 2.5 (L)   01/24/2020 2.3 (L)    Bilirubin Total (mg/dL)   Date Value   01/26/2020 5.3 (H)   01/23/2020 2.8 (H)   01/23/2020 2.7 (H)      Alkaline Phosphatase (U/L)   Date Value   01/26/2020 78   01/23/2020 40   01/23/2020 39 (L)    AST (U/L)   Date Value   01/26/2020 115 (H)   01/23/2020 364 (H)   01/23/2020 395 (H)    ALT (U/L)   Date Value   01/26/2020 138 (H)   01/25/2020 177 (H)   01/24/2020 185 (H)      No results found for: BILIDIRECT       Lipid Profile  No results found for: CHOL No results found for: HDL No results found for: LDL   No results found for: TRIG No results found for: CHOLHDLRATIO           Stroke Code Data  (for stroke code with tele)  Stroke code activated 1/26/2020 1134   First stroke provider response      1/26/20 1136   Video start time         Video end time         Last known normal       unknown   Time of discovery  (or onset of symptoms)       1/26/2020 0800    Head CT read by me       1/26/2020 1136   Was stroke code de-escalated?      Yes, pt not a candidate for acute intervention

## 2020-01-26 NOTE — PROGRESS NOTES
Pt's wife and family decided to withdraw care after speaking with Neuro Crit and CVTS regarding pt's poor prognosis. Pt was transitioned to comfort care/DNR. Pt extubated per family request at 1635 and ECMO circuit clamped at 1636. Time of death 1650, family and friends at bedside. No pt belongings present. CVTS aware of pt death, pronounced by Rashel Goldberg MD. Pt's daughter given the phone number to security to contact once a  home is chosen.

## 2020-01-26 NOTE — PROGRESS NOTES
Bedside ECMO turndown with MONAE guidance:    Starting flow 4.4L/min with sweep at 3L/min and FiO2 70%. Therapeutically anticoagulated on heparin per ACT goals. Baseline LV function EF 55-60% with difficulty visualizing RV. Please see echo report for full details.    Flows incrementally decreased by 0.5L/min while monitoring hemodynamics. Starting dose of epinephrine 0.05 and intermittent titration of norepinephrine up to 0.04 to support MAP. Stable function on echo down to flow of 1.5 L/min. Epi down to 0.03 at this time.    Final AB.46/46/79/33 on FiO2 40%.    Assessment/recommendation: Proceed to OR tomorrow for turndown/possible decannulation.    Fernando Carrasco, DO

## 2020-01-27 LAB
COPATH REPORT: NORMAL
KCT BLD-ACNC: 167 SEC (ref 75–150)
KCT BLD-ACNC: 171 SEC (ref 75–150)

## 2020-01-28 LAB
BLD PROD TYP BPU: NORMAL
BLD UNIT ID BPU: 0
BLOOD PRODUCT CODE: NORMAL
BPU ID: NORMAL
TRANSFUSION STATUS PATIENT QL: NORMAL

## 2020-01-30 LAB
BACTERIA SPEC CULT: NO GROWTH
Lab: NORMAL
SPECIMEN SOURCE: NORMAL

## 2020-01-31 LAB
BACTERIA SPEC CULT: NO GROWTH
Lab: NORMAL
SPECIMEN SOURCE: NORMAL

## 2020-02-01 LAB
BACTERIA SPEC CULT: NO GROWTH
Lab: NORMAL
SPECIMEN SOURCE: NORMAL

## 2020-02-17 LAB
FUNGUS SPEC CULT: ABNORMAL
FUNGUS SPEC CULT: ABNORMAL
SPECIMEN SOURCE: ABNORMAL

## 2020-03-16 LAB
MYCOBACTERIUM SPEC CULT: NORMAL
MYCOBACTERIUM SPEC CULT: NORMAL
SPECIMEN SOURCE: NORMAL

## 2023-06-16 NOTE — PROGRESS NOTES
CV ICU PROGRESS NOTE  1/24/2020  Enmanuel Hernandez  [id number]  Admitted: 1/17/2020 12:21 PM      CO-MORBIDITIES:   Dissection of aorta, unspecified portion of aorta (H)    ASSESSMENT: Enmanuel Hernandez is a 74 year old male who was admitted for aortic dissection. Patient transferred from [place] 1/16/20 and then transferred to [facility] in [place]. Air lifted to [facility] 1/17/20 where patient was found to be a prohibitively high risk of operation given his history of AVR. Will medically manage and optimize to reassess risk of surgery.  Discussed code status with wife, patient does not want Chest compressions but ok with having an ETT.  Bronchial cultures positive for haemophilus and strep pneumonia, started on Zosyn-Vanc (1/17) and patient narrowed to ceftriaxone (1/20). Extubated 1/20 but re-intubated 1/21 due to acute hypoxic respiratory failure. Pt underwent Type A aortic valve repair with hypothermic circulatory arrest on 1/22 and was started on VA ECMO due to inability to come off CP bypass intraoperatively. MTP also started due to coagulopathy intra-op. Chest washout (1/23). MTP stopped post-op. Back to OR tentatively on 1/26.    TODAY'S PROGRESS:   - POD#2 after Type A Aortic dissection repair, open chest, VA ECMO    PLAN:  Neurological:  #Perioperative Pain and anxiety   - Propofol gtt   - Precedex gtt   - Fentanyl gtt     Pulmonary:   #Acute hypoxic respiratory failure  #Strep pneumonia with superimposed haemophilus influenza   - Stop ceftriaxone (1/23)   - Hold Mucomyst nebs     Cardiovascular:    #History of AVR  #History of Aortic dissection s/p repair with new type A ascending aortic dissection  #S/P Type A aortic dissection repair with open chest   - Start Cefepime for open chest  #Heart failure   - VA ECMO   - Wean Flolan  #A flutter   - 2 ventricular paced    - Maintain MAP >65   - Diuresis to CVP goal 8-12   - Monitor via flotrak   - Stop Metoprolol 25mg BID     Gastroenterology/Nutrition:  #Shock  Telemetry notified writer - patient in intermittent A Flutter.   Dr. Lao notified and EKG released. Patient asymptomatic and vitally stable.   Liver   - Maintain MAP >65  - NPO for now  - Trend lactic acid  - Neutra-phos  - PPI  - Place NJ today     Electrolytes/Renal:  #Hypervolemia   - Acetazolamide, 1g once  #Hypernatremia   - Strict I&Os, ayala to remain   - Trend creatinine   - Diuresis to CVP goal 8-12   - FW to 40 ml/hr     Endocrine:  # Hyperglycemia   - SSI    - Goal to keep BG< 180 mg/dL    ID:  #Strep pneumonia with superimposed haemophilus influenza   - Stop Ceftriaxone (1/23)  #Open Chest   - Vancomycin for post-op ppx   - Start Cefepime, 2g Q8H    Heme:  #Coagulopathy   - Now off MTP   - Hold Heparin gtt    - TEG assay   - Transfuse for Hb >8, Platelets >50    General Cares/Prophylaxis:    DVT Prophylaxis: Heparin gtt (held)  GI Prophylaxis: PPI    Lines/ tubes/ drains:  - ETT, PIVx3, A.line x2, RIJ CVC, LIJ PAC, NG, chest tube x 6, ayala, rectal tube   - Remove L A. Line, remove R IJ    Disposition: CV ICU    Patient seen, findings and plan discussed with CV ICU staff Intensivist Dr. Danilo Harry, MS3    Resident/Fellow Attestation   I, Rashel Goldberg, was present with the medical student who participated in the service and in the documentation of the note.  I have verified the history and personally performed the physical exam and medical decision making.  I agree with the assessment and plan of care as documented in the note.      (Key findings; Repeat type A Dissection s/p repair - significant blood loss requiring MTP and return to OR yesterday for washout.  Currently requiring Epi and NE for cardiogenic vs distributive vs hemorrhagic shock.  CT output has decreased significantly since return from washout.  Plan to continue to wean pressors.     Acute hypoxic respiratory failure - Open chest, plan to maintain SpO2 > 92% and support with mechanical ventilation until chest closed     Atrial fibrillation/flutter - Now on amiodarone infusion.     Hypernatremia - Improved with increased FW flushes. )    Rashel Goldberg Jr., MD  Surgical  ICU Fellow  Pager: [phone]  1/24/2020  1:28 PM    Date of Service (when I saw the patient): 01/24/20    ====================================    TODAY'S PROGRESS:   SUBJECTIVE:   Now POD#2 s/p type A aortic dissection repair with deep hypothermic circulatory arrest. No acute events overnight.     OBJECTIVE:   1. VITAL SIGNS:   Temp:  [97.3  F (36.3  C)-98.4  F (36.9  C)] 98.4  F (36.9  C)  Pulse:  [86] 86  Heart Rate:  [78-98] 89  Resp:  [12-18] 12  BP: (86)/(67) 86/67  MAP:  [60 mmHg-285 mmHg] 82 mmHg  Arterial Line BP: ()/(28-85) 102/74  FiO2 (%):  [40 %-60 %] 60 %  SpO2:  [92 %-100 %] 99 %  Ventilation Mode: CMV/AC  (Continuous Mandatory Ventilation/ Assist Control)  FiO2 (%): 60 %  Rate Set (breaths/minute): 12 breaths/min  Tidal Volume Set (mL): 500 mL  PEEP (cm H2O): 10 cmH2O  Oxygen Concentration (%): 60 %  Resp: 12      2. INTAKE/ OUTPUT:   I/O last 3 completed shifts:  In: 76039.29 [I.V.:1612.29; Other:55787; NG/GT:775]  Out: 6962 [Urine:952; Emesis/NG output:200; Stool:100; Blood:2000; Chest Tube:3710]    3. PHYSICAL EXAMINATION:   General: Intubated, sedated, supine  HEENT: moist mucous membranes   Neuro: Sedated  Pulm/Resp: Mechanical breath sounds bilaterally  CV: RRR  Abdomen: Soft, distended, unable to assess pain d/t sedation  : ayala catheter in place  MSK/Extremities: peripheral pulses intact, extremities well perfused    4. INVESTIGATIONS:   Arterial Blood Gases   Recent Labs   Lab 01/24/20  1006 01/24/20  0729 01/24/20  0552 01/24/20  0328   PH 7.47* 7.40 7.42 7.41   PCO2 54* 63* 52* 62*   PO2 190* 173* 194* 144*   HCO3 39* 39* 33* 40*     Complete Blood Count   Recent Labs   Lab 01/24/20  1006 01/24/20 0328 01/23/20 2147 01/23/20  1557   WBC 16.6* 14.6* 11.4* 7.6   HGB 8.2* 8.7* 8.6* 9.3*   PLT 79* 83* 98* 97*     Basic Metabolic Panel  Recent Labs   Lab 01/24/20 0328 01/23/20 2147 01/23/20  1759 01/23/20  1557 01/23/20  1152   * 155* 155* 154* 155*   POTASSIUM 4.8 4.6  --   "4.6 3.9   CHLORIDE 109 110*  --  110* 109   CO2 40* 38*  --  35* 34*   BUN 47* 43*  --  33* 29   CR 1.82* 1.61*  --  1.34* 1.07   * 129*  --  120* 129*     Liver Function Tests  Recent Labs   Lab 01/24/20  0328 01/23/20  2147 01/23/20  1557 01/23/20  1152  01/23/20  0404 01/23/20  0258 01/23/20  0145  01/19/20  0329   AST  --   --   --   --   --  364* 395* 325*  --  87*   *  --   --   --   --  202* 211* 193*  --  144*   ALKPHOS  --   --   --   --   --  40 39* 35*  --  49   BILITOTAL  --   --   --   --   --  2.8* 2.7* 2.3*  --  1.6*   ALBUMIN 2.3*  --   --   --   --  2.2* 1.8* 1.6*  --  2.8*   INR 1.43* 1.33* 1.28* 1.09   < > 0.93 1.91* 1.64*   < > 1.38*    < > = values in this interval not displayed.     Pancreatic Enzymes  No lab results found in last 7 days.  Coagulation Profile  Recent Labs   Lab 01/24/20 0328 01/23/20 2147 01/23/20  1557 01/23/20  1152   INR 1.43* 1.33* 1.28* 1.09   PTT 33 35 36 36     Lactate  Invalid input(s): LACTATE    5. RADIOLOGY:     \"EXAM: XR CHEST PORT 1 VW  1/24/2020 2:20 AM      HISTORY:  ECMO     COMPARISON: Chest radiograph dated 1/23/2020     FINDINGS: Frontal views of the chest. Endotracheal tube tip projects  over the midthoracic trachea. Gastric tube courses below left  hemidiaphragm with tip off the field-of-view. Aortic valve prosthesis.  Postoperative changes of aortic dissection repair. Right IJ central  venous catheter tip projects over the low SVC. Left IJ sheath tip  projects over the left innominate vein. Stable ECMO cannulae,  mediastinal drains, and chest tubes. Multiple surgical sponges project  over the chest. [place]-Mayra catheter is removed.      Slightly decreased prominence of the cardiomediastinal silhouette.  Right greater than left pleural effusion, decreased from prior.  Prominent interstitial markings. Patchy lower lobe opacities.  Subcutaneous emphysema of the right chest wall.                                                                    " "  IMPRESSION: 1. Open chest status-post aortic dissection repair with  multiple surgical sponges projecting over the mediastinum. Stable  position of the ECMO cannulae.  2. [place]-Mayra catheter is removed. Additional support devices as above.  3. Decreased pleural effusions with right greater than left  interstitial and patchy airspace opacities.     I have personally reviewed the examination and initial interpretation  and I agree with the findings.     MARIO GRISSOM MD\"    =========================================      "

## (undated) DEVICE — DRAIN CHEST TUBE 24FR STR 8024

## (undated) DEVICE — SYR BULB IRRIG 50ML LATEX FREE 0035280

## (undated) DEVICE — SU PLEDGET SOFT TFE 13MMX7MMX1.5MM D7044

## (undated) DEVICE — SU VICRYL 3-0 FS-1 27" J442H

## (undated) DEVICE — PATCH HEMOSTAT FIBRIN SEALANT TACHOSIL 3.7X1.9" 1144922

## (undated) DEVICE — PREP DURAPREP 26ML APL 8630

## (undated) DEVICE — SU ETHIBOND 2-0 SHDA 30" X563H

## (undated) DEVICE — CONNECTOR CARDIO BLAKE DRAIN BCC2

## (undated) DEVICE — DRAIN CHEST TUBE 28FR STR 8028

## (undated) DEVICE — ESU BIPOLAR SEALER AQUAMANTYS 6MM 23-112-1

## (undated) DEVICE — SOL NACL 0.9% INJ 1000ML BAG 2B1324X

## (undated) DEVICE — DRAPE SLUSH/WARMER 66X44" ORS-320

## (undated) DEVICE — SPONGE KITTNER 30-101

## (undated) DEVICE — PITCHER STERILE 1000ML  SSK9004A

## (undated) DEVICE — DRSG PRIMAPORE 02X3" 7133

## (undated) DEVICE — ESU HOLSTER PLASTIC DISP E2400

## (undated) DEVICE — ESU EYE LOW TEMP AA17

## (undated) DEVICE — CLIP HORIZON MED BLUE 002200

## (undated) DEVICE — SOL NACL 0.9% IRRIG 3000ML BAG 2B7477

## (undated) DEVICE — SU VICRYL 0 CTX 36" J370H

## (undated) DEVICE — SU PLEDGET SOFT TFE 3/8"X3/26"X1/16" PCP40

## (undated) DEVICE — WIPES FOLEY CARE SURESTEP PROVON DFC100

## (undated) DEVICE — SPONGE RAY-TEC 4X8" 7318

## (undated) DEVICE — TAPE MEDIPORE 4"X2YD 2864

## (undated) DEVICE — SU PROLENE 6-0 C-1DA 30" 8706H

## (undated) DEVICE — TIES BANDING T50R

## (undated) DEVICE — SU PROLENE 3-0 SHDA 36" 8522H

## (undated) DEVICE — SU SILK 0 TIE 6X30" A306H

## (undated) DEVICE — GLOVE SENSICARE 7.5 MSG1075 LATEX FREE

## (undated) DEVICE — ESU PENCIL W/COATED BLADE E2450H

## (undated) DEVICE — SPONGE LAP 18X18" X8435

## (undated) DEVICE — ESU ELEC BLADE 6" COATED E1450-6

## (undated) DEVICE — SOL NACL 0.9% 10ML VIAL 0409-4888-02

## (undated) DEVICE — TIES CABLE STERILE 8" 17133/30

## (undated) DEVICE — PREP SKIN SCRUB TRAY 4461A

## (undated) DEVICE — DRSG PRIMAPORE 04 3/4X13 3/4" 66007140

## (undated) DEVICE — PREP POVIDONE IODINE SOLUTION 10% 4OZ

## (undated) DEVICE — LINEN TOWEL PACK X6 WHITE 5487

## (undated) DEVICE — GRAFT SIZERS 6-38MM

## (undated) DEVICE — SU VICRYL 2-0 CT-1 27" UND J259H

## (undated) DEVICE — SU ETHIBOND 3-0 BBDA 36" X588H

## (undated) DEVICE — DRSG TEGADERM 8X12" 1629

## (undated) DEVICE — DRAIN MEDIASTINAL 9MM 14609

## (undated) DEVICE — ESU GROUND PAD E7506

## (undated) DEVICE — SYR BIOGLUE PREFILLED 5ML BG3515-5-US

## (undated) DEVICE — CLIP HORIZON SM RED WIDE SLOT 001201

## (undated) DEVICE — CELL SAVER

## (undated) DEVICE — TOURNIQUET VASCULAR KIT 7 1/2" 79012

## (undated) DEVICE — TUBING INSUFFLATION W/FILTER CPC TO LUER 620-030-301

## (undated) DEVICE — SURGICEL HEMOSTAT 4X8" 1952

## (undated) DEVICE — SU PROLENE 4-0 SHDA 36" 8521H

## (undated) DEVICE — DRAIN CHEST TUBE 36FR STR 8036

## (undated) DEVICE — VESSEL LOOPS BLUE SUPERMAXI 011022PBX

## (undated) DEVICE — SOL WATER IRRIG 1000ML BOTTLE 2F7114

## (undated) DEVICE — PACK ADULT HEART UMMC PV15CG92D

## (undated) DEVICE — DRAPE TIBURON CARDIOVASCULAR PERI-GROIN LF 9154

## (undated) DEVICE — SUCTION DRY CHEST DRAIN OASIS 3600-100

## (undated) DEVICE — EYE PREP BETADINE 5% SOLUTION 30ML 0065-0411-30

## (undated) DEVICE — SUCTION CATH AIRLIFE TRI-FLO W/CONTROL PORT 14FR  T60C

## (undated) DEVICE — DRSG DRAIN 4X4" 7086

## (undated) DEVICE — PROTECTOR ARM ONE-STEP TRENDELENBURG 40418

## (undated) DEVICE — CONNECTOR DRAIN CHEST Y EXTENSION SET 19909

## (undated) DEVICE — SU PROLENE 3-0 SHDA 48" 8534H

## (undated) DEVICE — DRAIN CHEST TUBE 32FR STR 8032

## (undated) DEVICE — ADH SKIN CLOSURE PREMIERPRO EXOFIN 1.0ML 3470

## (undated) DEVICE — SOL NACL 0.9% IRRIG 1000ML BOTTLE 2F7124

## (undated) DEVICE — ESU ELEC BLADE 2.75" COATED/INSULATED E1455

## (undated) DEVICE — LINEN TOWEL PACK X30 5481

## (undated) DEVICE — INTRO SHEATH BRITE TIP 6FRX11CM 401-611M

## (undated) DEVICE — CLIP HORIZON LG ORANGE 004200

## (undated) DEVICE — DEFIB PRO-PADZ LVP LQD GEL ADULT 8900-2105-01

## (undated) DEVICE — PREP CHLORAPREP 26ML TINTED ORANGE  260815

## (undated) DEVICE — SU ETHIBOND 0 CT-1 CR 8X18" CX21D

## (undated) DEVICE — NDL ANGIOCATH 14GA 1.25" 4048

## (undated) DEVICE — BLADE CLIPPER SGL USE 9680

## (undated) DEVICE — SU PROLENE 4-0 RB-1DA 36" 8557H

## (undated) DEVICE — KIT MICRO-INTRODUCER STIFFEN 4FR 7274V

## (undated) DEVICE — BNDG ESMARK 6" STERILE LF 820-3612

## (undated) DEVICE — PREP CHLORHEXIDINE 4% 4OZ (HIBICLENS) 57504

## (undated) DEVICE — SU PROLENE 3-0 MH 36" 8842H

## (undated) DEVICE — SU PROLENE 5-0 RB-2DA 30" 8710H

## (undated) DEVICE — DRSG ABDOMINAL 07 1/2X8" 7197D

## (undated) DEVICE — BONE WAX 2.5GM W31G

## (undated) DEVICE — SUTURE BOOTS 051003PBX

## (undated) DEVICE — DRAPE IOBAN INCISE 23X17" 6650EZ

## (undated) DEVICE — SUCTION TIP YANKAUER STR K87

## (undated) DEVICE — NDL COUNTER 20CT 31142493

## (undated) DEVICE — BLADE SAW SAGITTAL STERNOTOMY CV SM LINVATEC 5023-187

## (undated) RX ORDER — PHENYLEPHRINE HCL IN 0.9% NACL 1 MG/10 ML
SYRINGE (ML) INTRAVENOUS
Status: DISPENSED
Start: 2020-01-01

## (undated) RX ORDER — VANCOMYCIN HYDROCHLORIDE 1 G/20ML
INJECTION, POWDER, LYOPHILIZED, FOR SOLUTION INTRAVENOUS
Status: DISPENSED
Start: 2020-01-01

## (undated) RX ORDER — HEPARIN SODIUM 1000 [USP'U]/ML
INJECTION, SOLUTION INTRAVENOUS; SUBCUTANEOUS
Status: DISPENSED
Start: 2020-01-01

## (undated) RX ORDER — PROTAMINE SULFATE 10 MG/ML
INJECTION, SOLUTION INTRAVENOUS
Status: DISPENSED
Start: 2020-01-01

## (undated) RX ORDER — PROPOFOL 10 MG/ML
INJECTION, EMULSION INTRAVENOUS
Status: DISPENSED
Start: 2020-01-01

## (undated) RX ORDER — FENTANYL CITRATE 50 UG/ML
INJECTION, SOLUTION INTRAMUSCULAR; INTRAVENOUS
Status: DISPENSED
Start: 2020-01-01

## (undated) RX ORDER — FUROSEMIDE 10 MG/ML
INJECTION INTRAMUSCULAR; INTRAVENOUS
Status: DISPENSED
Start: 2020-01-01

## (undated) RX ORDER — LIDOCAINE HYDROCHLORIDE 20 MG/ML
INJECTION, SOLUTION EPIDURAL; INFILTRATION; INTRACAUDAL; PERINEURAL
Status: DISPENSED
Start: 2020-01-01

## (undated) RX ORDER — LIDOCAINE HYDROCHLORIDE 20 MG/ML
SOLUTION OROPHARYNGEAL
Status: DISPENSED
Start: 2020-01-01